# Patient Record
Sex: MALE | Race: WHITE | NOT HISPANIC OR LATINO | Employment: OTHER | URBAN - METROPOLITAN AREA
[De-identification: names, ages, dates, MRNs, and addresses within clinical notes are randomized per-mention and may not be internally consistent; named-entity substitution may affect disease eponyms.]

---

## 2017-01-07 ENCOUNTER — COMMUNICATION - HEALTHEAST (OUTPATIENT)
Dept: FAMILY MEDICINE | Facility: CLINIC | Age: 77
End: 2017-01-07

## 2017-01-07 DIAGNOSIS — I26.99 PULMONARY EMBOLUS (H): ICD-10-CM

## 2017-01-19 ENCOUNTER — RECORDS - HEALTHEAST (OUTPATIENT)
Dept: ADMINISTRATIVE | Facility: OTHER | Age: 77
End: 2017-01-19

## 2017-03-09 ENCOUNTER — COMMUNICATION - HEALTHEAST (OUTPATIENT)
Dept: FAMILY MEDICINE | Facility: CLINIC | Age: 77
End: 2017-03-09

## 2017-03-09 ENCOUNTER — OFFICE VISIT - HEALTHEAST (OUTPATIENT)
Dept: FAMILY MEDICINE | Facility: CLINIC | Age: 77
End: 2017-03-09

## 2017-03-09 DIAGNOSIS — N48.1 BALANITIS: ICD-10-CM

## 2017-03-09 DIAGNOSIS — G47.00 INSOMNIA: ICD-10-CM

## 2017-03-09 DIAGNOSIS — R53.83 MALAISE AND FATIGUE: ICD-10-CM

## 2017-03-09 DIAGNOSIS — L29.9 ITCHING: ICD-10-CM

## 2017-03-09 DIAGNOSIS — R53.81 MALAISE AND FATIGUE: ICD-10-CM

## 2017-03-23 ENCOUNTER — OFFICE VISIT - HEALTHEAST (OUTPATIENT)
Dept: FAMILY MEDICINE | Facility: CLINIC | Age: 77
End: 2017-03-23

## 2017-03-23 DIAGNOSIS — M54.16 LUMBAR RADICULOPATHY: ICD-10-CM

## 2017-03-31 ENCOUNTER — HOSPITAL ENCOUNTER (OUTPATIENT)
Dept: MRI IMAGING | Facility: CLINIC | Age: 77
Discharge: HOME | End: 2017-03-31
Attending: FAMILY MEDICINE

## 2017-03-31 DIAGNOSIS — M54.16 LUMBAR RADICULOPATHY: ICD-10-CM

## 2017-04-02 ENCOUNTER — AMBULATORY - HEALTHEAST (OUTPATIENT)
Dept: FAMILY MEDICINE | Facility: CLINIC | Age: 77
End: 2017-04-02

## 2017-04-02 ENCOUNTER — COMMUNICATION - HEALTHEAST (OUTPATIENT)
Dept: FAMILY MEDICINE | Facility: CLINIC | Age: 77
End: 2017-04-02

## 2017-04-02 DIAGNOSIS — R10.31 RIGHT GROIN PAIN: ICD-10-CM

## 2017-04-03 ENCOUNTER — RECORDS - HEALTHEAST (OUTPATIENT)
Dept: GENERAL RADIOLOGY | Facility: CLINIC | Age: 77
End: 2017-04-03

## 2017-04-03 ENCOUNTER — AMBULATORY - HEALTHEAST (OUTPATIENT)
Dept: LAB | Facility: CLINIC | Age: 77
End: 2017-04-03

## 2017-04-03 DIAGNOSIS — R10.30 LOWER ABDOMINAL PAIN, UNSPECIFIED: ICD-10-CM

## 2017-04-03 DIAGNOSIS — R10.31 RIGHT GROIN PAIN: ICD-10-CM

## 2017-04-04 ENCOUNTER — COMMUNICATION - HEALTHEAST (OUTPATIENT)
Dept: FAMILY MEDICINE | Facility: CLINIC | Age: 77
End: 2017-04-04

## 2017-04-04 DIAGNOSIS — I26.99 PULMONARY EMBOLUS (H): ICD-10-CM

## 2017-04-05 ENCOUNTER — HOSPITAL ENCOUNTER (OUTPATIENT)
Dept: PHYSICAL MEDICINE AND REHAB | Facility: CLINIC | Age: 77
Discharge: HOME | End: 2017-04-05
Attending: PHYSICIAN ASSISTANT

## 2017-04-05 DIAGNOSIS — M54.16 LUMBAR RADICULITIS: ICD-10-CM

## 2017-04-05 DIAGNOSIS — M76.30 IT BAND SYNDROME: ICD-10-CM

## 2017-04-05 DIAGNOSIS — G47.00 INSOMNIA: ICD-10-CM

## 2017-04-05 DIAGNOSIS — M47.816 LUMBAR FACET ARTHROPATHY: ICD-10-CM

## 2017-04-05 RX ORDER — ACETAMINOPHEN 500 MG
500 TABLET ORAL EVERY 6 HOURS PRN
Status: SHIPPED | COMMUNITY
Start: 2017-04-05

## 2017-04-05 ASSESSMENT — MIFFLIN-ST. JEOR: SCORE: 1547.49

## 2017-04-19 ENCOUNTER — RECORDS - HEALTHEAST (OUTPATIENT)
Dept: ADMINISTRATIVE | Facility: OTHER | Age: 77
End: 2017-04-19

## 2017-05-02 ENCOUNTER — COMMUNICATION - HEALTHEAST (OUTPATIENT)
Dept: FAMILY MEDICINE | Facility: CLINIC | Age: 77
End: 2017-05-02

## 2017-06-01 ENCOUNTER — COMMUNICATION - HEALTHEAST (OUTPATIENT)
Dept: FAMILY MEDICINE | Facility: CLINIC | Age: 77
End: 2017-06-01

## 2017-06-26 ENCOUNTER — RECORDS - HEALTHEAST (OUTPATIENT)
Dept: ADMINISTRATIVE | Facility: OTHER | Age: 77
End: 2017-06-26

## 2017-06-29 ENCOUNTER — OFFICE VISIT - HEALTHEAST (OUTPATIENT)
Dept: FAMILY MEDICINE | Facility: CLINIC | Age: 77
End: 2017-06-29

## 2017-06-29 ENCOUNTER — RECORDS - HEALTHEAST (OUTPATIENT)
Dept: GENERAL RADIOLOGY | Facility: CLINIC | Age: 77
End: 2017-06-29

## 2017-06-29 DIAGNOSIS — M25.511 PAIN IN RIGHT SHOULDER: ICD-10-CM

## 2017-06-29 DIAGNOSIS — G47.00 INSOMNIA: ICD-10-CM

## 2017-06-29 DIAGNOSIS — M25.511 ACUTE PAIN OF RIGHT SHOULDER: ICD-10-CM

## 2017-06-29 DIAGNOSIS — F41.1 ANXIETY STATE: ICD-10-CM

## 2017-06-29 ASSESSMENT — MIFFLIN-ST. JEOR: SCORE: 1548.39

## 2017-07-02 ENCOUNTER — COMMUNICATION - HEALTHEAST (OUTPATIENT)
Dept: FAMILY MEDICINE | Facility: CLINIC | Age: 77
End: 2017-07-02

## 2017-07-02 DIAGNOSIS — I26.99 PULMONARY EMBOLUS (H): ICD-10-CM

## 2017-07-07 ENCOUNTER — RECORDS - HEALTHEAST (OUTPATIENT)
Dept: ADMINISTRATIVE | Facility: OTHER | Age: 77
End: 2017-07-07

## 2017-07-09 ENCOUNTER — COMMUNICATION - HEALTHEAST (OUTPATIENT)
Dept: FAMILY MEDICINE | Facility: CLINIC | Age: 77
End: 2017-07-09

## 2017-07-21 ENCOUNTER — RECORDS - HEALTHEAST (OUTPATIENT)
Dept: ADMINISTRATIVE | Facility: OTHER | Age: 77
End: 2017-07-21

## 2017-08-17 ENCOUNTER — OFFICE VISIT - HEALTHEAST (OUTPATIENT)
Dept: FAMILY MEDICINE | Facility: CLINIC | Age: 77
End: 2017-08-17

## 2017-08-17 DIAGNOSIS — G47.00 INSOMNIA: ICD-10-CM

## 2017-08-17 DIAGNOSIS — I10 ESSENTIAL HYPERTENSION: ICD-10-CM

## 2017-09-08 ENCOUNTER — OFFICE VISIT - HEALTHEAST (OUTPATIENT)
Dept: FAMILY MEDICINE | Facility: CLINIC | Age: 77
End: 2017-09-08

## 2017-09-08 ENCOUNTER — COMMUNICATION - HEALTHEAST (OUTPATIENT)
Dept: SCHEDULING | Facility: CLINIC | Age: 77
End: 2017-09-08

## 2017-09-08 DIAGNOSIS — I10 ESSENTIAL HYPERTENSION WITH GOAL BLOOD PRESSURE LESS THAN 130/85: ICD-10-CM

## 2017-09-14 ENCOUNTER — COMMUNICATION - HEALTHEAST (OUTPATIENT)
Dept: FAMILY MEDICINE | Facility: CLINIC | Age: 77
End: 2017-09-14

## 2017-09-14 DIAGNOSIS — E78.5 HYPERLIPIDEMIA: ICD-10-CM

## 2017-09-27 ENCOUNTER — COMMUNICATION - HEALTHEAST (OUTPATIENT)
Dept: FAMILY MEDICINE | Facility: CLINIC | Age: 77
End: 2017-09-27

## 2017-09-27 DIAGNOSIS — I26.99 PULMONARY EMBOLUS (H): ICD-10-CM

## 2017-10-05 ENCOUNTER — OFFICE VISIT - HEALTHEAST (OUTPATIENT)
Dept: FAMILY MEDICINE | Facility: CLINIC | Age: 77
End: 2017-10-05

## 2017-10-05 ENCOUNTER — RECORDS - HEALTHEAST (OUTPATIENT)
Dept: GENERAL RADIOLOGY | Facility: CLINIC | Age: 77
End: 2017-10-05

## 2017-10-05 DIAGNOSIS — C61 MALIGNANT NEOPLASM OF PROSTATE (H): ICD-10-CM

## 2017-10-05 DIAGNOSIS — D68.51 FACTOR V LEIDEN MUTATION (H): ICD-10-CM

## 2017-10-05 DIAGNOSIS — E78.5 HYPERLIPIDEMIA: ICD-10-CM

## 2017-10-05 DIAGNOSIS — M54.2 CERVICAL PAIN (NECK): ICD-10-CM

## 2017-10-05 DIAGNOSIS — Z00.00 ROUTINE GENERAL MEDICAL EXAMINATION AT A HEALTH CARE FACILITY: ICD-10-CM

## 2017-10-05 DIAGNOSIS — M25.511 SHOULDER PAIN, RIGHT: ICD-10-CM

## 2017-10-05 DIAGNOSIS — M54.2 CERVICALGIA: ICD-10-CM

## 2017-10-05 DIAGNOSIS — I10 ESSENTIAL HYPERTENSION: ICD-10-CM

## 2017-10-05 DIAGNOSIS — I26.99 PULMONARY EMBOLUS (H): ICD-10-CM

## 2017-10-05 LAB
CHOLEST SERPL-MCNC: 205 MG/DL
FASTING STATUS PATIENT QL REPORTED: YES
HDLC SERPL-MCNC: 57 MG/DL
LDLC SERPL CALC-MCNC: 106 MG/DL
TRIGL SERPL-MCNC: 210 MG/DL

## 2017-10-05 ASSESSMENT — MIFFLIN-ST. JEOR: SCORE: 1538.19

## 2017-10-06 ENCOUNTER — RECORDS - HEALTHEAST (OUTPATIENT)
Dept: ADMINISTRATIVE | Facility: OTHER | Age: 77
End: 2017-10-06

## 2017-10-10 ENCOUNTER — RECORDS - HEALTHEAST (OUTPATIENT)
Dept: ADMINISTRATIVE | Facility: OTHER | Age: 77
End: 2017-10-10

## 2017-10-24 ENCOUNTER — RECORDS - HEALTHEAST (OUTPATIENT)
Dept: ADMINISTRATIVE | Facility: OTHER | Age: 77
End: 2017-10-24

## 2017-11-12 ENCOUNTER — COMMUNICATION - HEALTHEAST (OUTPATIENT)
Dept: FAMILY MEDICINE | Facility: CLINIC | Age: 77
End: 2017-11-12

## 2017-11-12 DIAGNOSIS — G47.00 INSOMNIA: ICD-10-CM

## 2017-11-22 ENCOUNTER — COMMUNICATION - HEALTHEAST (OUTPATIENT)
Dept: FAMILY MEDICINE | Facility: CLINIC | Age: 77
End: 2017-11-22

## 2017-11-22 DIAGNOSIS — I10 ESSENTIAL HYPERTENSION: ICD-10-CM

## 2017-11-29 ENCOUNTER — OFFICE VISIT - HEALTHEAST (OUTPATIENT)
Dept: FAMILY MEDICINE | Facility: CLINIC | Age: 77
End: 2017-11-29

## 2017-11-29 DIAGNOSIS — M47.812 ARTHROPATHY OF CERVICAL SPINE: ICD-10-CM

## 2017-11-29 DIAGNOSIS — M54.2 CERVICAL PAIN (NECK): ICD-10-CM

## 2017-11-29 ASSESSMENT — MIFFLIN-ST. JEOR: SCORE: 1551.8

## 2017-12-04 ENCOUNTER — RECORDS - HEALTHEAST (OUTPATIENT)
Dept: ADMINISTRATIVE | Facility: OTHER | Age: 77
End: 2017-12-04

## 2017-12-11 ENCOUNTER — RECORDS - HEALTHEAST (OUTPATIENT)
Dept: ADMINISTRATIVE | Facility: OTHER | Age: 77
End: 2017-12-11

## 2017-12-11 ENCOUNTER — COMMUNICATION - HEALTHEAST (OUTPATIENT)
Dept: FAMILY MEDICINE | Facility: CLINIC | Age: 77
End: 2017-12-11

## 2017-12-11 DIAGNOSIS — E78.5 HYPERLIPIDEMIA: ICD-10-CM

## 2017-12-29 ENCOUNTER — RECORDS - HEALTHEAST (OUTPATIENT)
Dept: ADMINISTRATIVE | Facility: OTHER | Age: 77
End: 2017-12-29

## 2018-01-05 ENCOUNTER — RECORDS - HEALTHEAST (OUTPATIENT)
Dept: ADMINISTRATIVE | Facility: OTHER | Age: 78
End: 2018-01-05

## 2018-02-01 ENCOUNTER — RECORDS - HEALTHEAST (OUTPATIENT)
Dept: ADMINISTRATIVE | Facility: OTHER | Age: 78
End: 2018-02-01

## 2018-02-10 ENCOUNTER — COMMUNICATION - HEALTHEAST (OUTPATIENT)
Dept: FAMILY MEDICINE | Facility: CLINIC | Age: 78
End: 2018-02-10

## 2018-02-10 DIAGNOSIS — G47.00 INSOMNIA: ICD-10-CM

## 2018-02-12 ENCOUNTER — OFFICE VISIT - HEALTHEAST (OUTPATIENT)
Dept: FAMILY MEDICINE | Facility: CLINIC | Age: 78
End: 2018-02-12

## 2018-02-12 DIAGNOSIS — I10 HYPERTENSION: ICD-10-CM

## 2018-02-12 ASSESSMENT — MIFFLIN-ST. JEOR: SCORE: 1556.33

## 2018-03-16 ENCOUNTER — AMBULATORY - HEALTHEAST (OUTPATIENT)
Dept: LAB | Facility: CLINIC | Age: 78
End: 2018-03-16

## 2018-03-16 DIAGNOSIS — I10 HYPERTENSION: ICD-10-CM

## 2018-03-16 LAB
CREAT SERPL-MCNC: 1.14 MG/DL (ref 0.7–1.3)
GFR SERPL CREATININE-BSD FRML MDRD: >60 ML/MIN/1.73M2
POTASSIUM BLD-SCNC: 4.7 MMOL/L (ref 3.5–5)

## 2018-03-20 ENCOUNTER — COMMUNICATION - HEALTHEAST (OUTPATIENT)
Dept: FAMILY MEDICINE | Facility: CLINIC | Age: 78
End: 2018-03-20

## 2018-03-20 DIAGNOSIS — I26.99 PULMONARY EMBOLUS (H): ICD-10-CM

## 2018-04-11 ENCOUNTER — RECORDS - HEALTHEAST (OUTPATIENT)
Dept: ADMINISTRATIVE | Facility: OTHER | Age: 78
End: 2018-04-11

## 2018-04-25 ENCOUNTER — RECORDS - HEALTHEAST (OUTPATIENT)
Dept: ADMINISTRATIVE | Facility: OTHER | Age: 78
End: 2018-04-25

## 2018-05-17 ENCOUNTER — COMMUNICATION - HEALTHEAST (OUTPATIENT)
Dept: FAMILY MEDICINE | Facility: CLINIC | Age: 78
End: 2018-05-17

## 2018-05-17 DIAGNOSIS — G47.00 INSOMNIA: ICD-10-CM

## 2018-05-24 ENCOUNTER — COMMUNICATION - HEALTHEAST (OUTPATIENT)
Dept: FAMILY MEDICINE | Facility: CLINIC | Age: 78
End: 2018-05-24

## 2018-05-31 ENCOUNTER — AMBULATORY - HEALTHEAST (OUTPATIENT)
Dept: NURSING | Facility: CLINIC | Age: 78
End: 2018-05-31

## 2018-06-12 ENCOUNTER — COMMUNICATION - HEALTHEAST (OUTPATIENT)
Dept: FAMILY MEDICINE | Facility: CLINIC | Age: 78
End: 2018-06-12

## 2018-06-12 DIAGNOSIS — I26.99 PULMONARY EMBOLUS (H): ICD-10-CM

## 2018-08-07 ENCOUNTER — COMMUNICATION - HEALTHEAST (OUTPATIENT)
Dept: FAMILY MEDICINE | Facility: CLINIC | Age: 78
End: 2018-08-07

## 2018-08-07 DIAGNOSIS — I10 HYPERTENSION: ICD-10-CM

## 2018-08-13 ENCOUNTER — COMMUNICATION - HEALTHEAST (OUTPATIENT)
Dept: FAMILY MEDICINE | Facility: CLINIC | Age: 78
End: 2018-08-13

## 2018-08-13 DIAGNOSIS — G47.00 INSOMNIA: ICD-10-CM

## 2018-08-23 ENCOUNTER — OFFICE VISIT - HEALTHEAST (OUTPATIENT)
Dept: FAMILY MEDICINE | Facility: CLINIC | Age: 78
End: 2018-08-23

## 2018-08-23 DIAGNOSIS — I10 HYPERTENSION: ICD-10-CM

## 2018-08-23 DIAGNOSIS — F41.1 ANXIETY STATE: ICD-10-CM

## 2018-08-23 DIAGNOSIS — E78.5 HYPERLIPIDEMIA: ICD-10-CM

## 2018-08-23 DIAGNOSIS — I26.99 PULMONARY EMBOLUS (H): ICD-10-CM

## 2018-08-23 ASSESSMENT — MIFFLIN-ST. JEOR: SCORE: 1547.83

## 2018-09-03 ENCOUNTER — COMMUNICATION - HEALTHEAST (OUTPATIENT)
Dept: FAMILY MEDICINE | Facility: CLINIC | Age: 78
End: 2018-09-03

## 2018-09-03 DIAGNOSIS — E78.5 HYPERLIPIDEMIA: ICD-10-CM

## 2018-10-10 ENCOUNTER — OFFICE VISIT - HEALTHEAST (OUTPATIENT)
Dept: FAMILY MEDICINE | Facility: CLINIC | Age: 78
End: 2018-10-10

## 2018-10-10 DIAGNOSIS — I26.99 PULMONARY EMBOLUS (H): ICD-10-CM

## 2018-10-10 DIAGNOSIS — Z23 FLU VACCINE NEED: ICD-10-CM

## 2018-10-10 DIAGNOSIS — I10 ESSENTIAL HYPERTENSION: ICD-10-CM

## 2018-10-10 DIAGNOSIS — D68.51 FACTOR V LEIDEN MUTATION (H): ICD-10-CM

## 2018-10-10 DIAGNOSIS — C61 PROSTATE CANCER (H): ICD-10-CM

## 2018-10-10 DIAGNOSIS — Z00.00 MEDICARE ANNUAL WELLNESS VISIT, SUBSEQUENT: ICD-10-CM

## 2018-10-10 DIAGNOSIS — Z23 NEED FOR SHINGLES VACCINE: ICD-10-CM

## 2018-10-10 DIAGNOSIS — E78.5 HYPERLIPIDEMIA: ICD-10-CM

## 2018-10-10 DIAGNOSIS — D12.6 POLYP OF COLON, ADENOMATOUS: ICD-10-CM

## 2018-10-10 LAB
ALBUMIN SERPL-MCNC: 3.8 G/DL (ref 3.5–5)
ALP SERPL-CCNC: 50 U/L (ref 45–120)
ALT SERPL W P-5'-P-CCNC: 22 U/L (ref 0–45)
ANION GAP SERPL CALCULATED.3IONS-SCNC: 9 MMOL/L (ref 5–18)
AST SERPL W P-5'-P-CCNC: 21 U/L (ref 0–40)
BILIRUB SERPL-MCNC: 1 MG/DL (ref 0–1)
BUN SERPL-MCNC: 21 MG/DL (ref 8–28)
CALCIUM SERPL-MCNC: 9.4 MG/DL (ref 8.5–10.5)
CHLORIDE BLD-SCNC: 106 MMOL/L (ref 98–107)
CHOLEST SERPL-MCNC: 177 MG/DL
CO2 SERPL-SCNC: 28 MMOL/L (ref 22–31)
CREAT SERPL-MCNC: 1.08 MG/DL (ref 0.7–1.3)
ERYTHROCYTE [DISTWIDTH] IN BLOOD BY AUTOMATED COUNT: 12 % (ref 11–14.5)
FASTING STATUS PATIENT QL REPORTED: YES
GFR SERPL CREATININE-BSD FRML MDRD: >60 ML/MIN/1.73M2
GLUCOSE BLD-MCNC: 89 MG/DL (ref 70–125)
HCT VFR BLD AUTO: 46.5 % (ref 40–54)
HDLC SERPL-MCNC: 54 MG/DL
HGB BLD-MCNC: 15.4 G/DL (ref 14–18)
LDLC SERPL CALC-MCNC: 88 MG/DL
MCH RBC QN AUTO: 32 PG (ref 27–34)
MCHC RBC AUTO-ENTMCNC: 33.1 G/DL (ref 32–36)
MCV RBC AUTO: 97 FL (ref 80–100)
PLATELET # BLD AUTO: 164 THOU/UL (ref 140–440)
PMV BLD AUTO: 8.3 FL (ref 7–10)
POTASSIUM BLD-SCNC: 4.3 MMOL/L (ref 3.5–5)
PROT SERPL-MCNC: 6.7 G/DL (ref 6–8)
RBC # BLD AUTO: 4.82 MILL/UL (ref 4.4–6.2)
SODIUM SERPL-SCNC: 143 MMOL/L (ref 136–145)
TRIGL SERPL-MCNC: 173 MG/DL
WBC: 4.3 THOU/UL (ref 4–11)

## 2018-10-10 ASSESSMENT — MIFFLIN-ST. JEOR: SCORE: 1526.28

## 2018-10-11 LAB — 25(OH)D3 SERPL-MCNC: 33.4 NG/ML (ref 30–80)

## 2018-11-20 ENCOUNTER — COMMUNICATION - HEALTHEAST (OUTPATIENT)
Dept: FAMILY MEDICINE | Facility: CLINIC | Age: 78
End: 2018-11-20

## 2018-11-20 DIAGNOSIS — I10 HYPERTENSION: ICD-10-CM

## 2018-11-24 ENCOUNTER — COMMUNICATION - HEALTHEAST (OUTPATIENT)
Dept: FAMILY MEDICINE | Facility: CLINIC | Age: 78
End: 2018-11-24

## 2018-11-24 DIAGNOSIS — G47.00 INSOMNIA: ICD-10-CM

## 2018-12-01 ENCOUNTER — COMMUNICATION - HEALTHEAST (OUTPATIENT)
Dept: FAMILY MEDICINE | Facility: CLINIC | Age: 78
End: 2018-12-01

## 2018-12-01 DIAGNOSIS — I26.99 PULMONARY EMBOLUS (H): ICD-10-CM

## 2018-12-20 ENCOUNTER — COMMUNICATION - HEALTHEAST (OUTPATIENT)
Dept: FAMILY MEDICINE | Facility: CLINIC | Age: 78
End: 2018-12-20

## 2019-01-24 ENCOUNTER — COMMUNICATION - HEALTHEAST (OUTPATIENT)
Dept: FAMILY MEDICINE | Facility: CLINIC | Age: 79
End: 2019-01-24

## 2019-01-24 DIAGNOSIS — I10 HYPERTENSION: ICD-10-CM

## 2019-01-25 ENCOUNTER — COMMUNICATION - HEALTHEAST (OUTPATIENT)
Dept: FAMILY MEDICINE | Facility: CLINIC | Age: 79
End: 2019-01-25

## 2019-01-25 DIAGNOSIS — I10 HYPERTENSION: ICD-10-CM

## 2019-01-31 ENCOUNTER — AMBULATORY - HEALTHEAST (OUTPATIENT)
Dept: FAMILY MEDICINE | Facility: CLINIC | Age: 79
End: 2019-01-31

## 2019-02-01 ENCOUNTER — AMBULATORY - HEALTHEAST (OUTPATIENT)
Dept: NURSING | Facility: CLINIC | Age: 79
End: 2019-02-01

## 2019-02-18 ENCOUNTER — RECORDS - HEALTHEAST (OUTPATIENT)
Dept: ADMINISTRATIVE | Facility: OTHER | Age: 79
End: 2019-02-18

## 2019-02-22 ENCOUNTER — COMMUNICATION - HEALTHEAST (OUTPATIENT)
Dept: FAMILY MEDICINE | Facility: CLINIC | Age: 79
End: 2019-02-22

## 2019-02-22 DIAGNOSIS — G47.00 INSOMNIA: ICD-10-CM

## 2019-02-25 ENCOUNTER — RECORDS - HEALTHEAST (OUTPATIENT)
Dept: ADMINISTRATIVE | Facility: OTHER | Age: 79
End: 2019-02-25

## 2019-05-17 ENCOUNTER — COMMUNICATION - HEALTHEAST (OUTPATIENT)
Dept: FAMILY MEDICINE | Facility: CLINIC | Age: 79
End: 2019-05-17

## 2019-05-17 DIAGNOSIS — I26.99 PULMONARY EMBOLUS (H): ICD-10-CM

## 2019-05-25 ENCOUNTER — COMMUNICATION - HEALTHEAST (OUTPATIENT)
Dept: FAMILY MEDICINE | Facility: CLINIC | Age: 79
End: 2019-05-25

## 2019-05-25 DIAGNOSIS — G47.00 INSOMNIA: ICD-10-CM

## 2019-06-06 ENCOUNTER — RECORDS - HEALTHEAST (OUTPATIENT)
Dept: ADMINISTRATIVE | Facility: OTHER | Age: 79
End: 2019-06-06

## 2019-07-12 ENCOUNTER — RECORDS - HEALTHEAST (OUTPATIENT)
Dept: ADMINISTRATIVE | Facility: OTHER | Age: 79
End: 2019-07-12

## 2019-07-24 ENCOUNTER — COMMUNICATION - HEALTHEAST (OUTPATIENT)
Dept: FAMILY MEDICINE | Facility: CLINIC | Age: 79
End: 2019-07-24

## 2019-07-24 DIAGNOSIS — F41.1 ANXIETY STATE: ICD-10-CM

## 2019-07-26 ENCOUNTER — OFFICE VISIT - HEALTHEAST (OUTPATIENT)
Dept: FAMILY MEDICINE | Facility: CLINIC | Age: 79
End: 2019-07-26

## 2019-07-26 DIAGNOSIS — B37.0 THRUSH: ICD-10-CM

## 2019-07-26 ASSESSMENT — MIFFLIN-ST. JEOR: SCORE: 1535.35

## 2019-08-15 ENCOUNTER — COMMUNICATION - HEALTHEAST (OUTPATIENT)
Dept: FAMILY MEDICINE | Facility: CLINIC | Age: 79
End: 2019-08-15

## 2019-08-15 DIAGNOSIS — I26.99 PULMONARY EMBOLUS (H): ICD-10-CM

## 2019-08-22 ENCOUNTER — COMMUNICATION - HEALTHEAST (OUTPATIENT)
Dept: FAMILY MEDICINE | Facility: CLINIC | Age: 79
End: 2019-08-22

## 2019-08-22 ENCOUNTER — OFFICE VISIT - HEALTHEAST (OUTPATIENT)
Dept: FAMILY MEDICINE | Facility: CLINIC | Age: 79
End: 2019-08-22

## 2019-08-22 DIAGNOSIS — I10 ESSENTIAL HYPERTENSION: ICD-10-CM

## 2019-08-22 DIAGNOSIS — K12.30 STOMATITIS AND MUCOSITIS: ICD-10-CM

## 2019-08-22 DIAGNOSIS — R63.30 FEEDING DIFFICULTIES: ICD-10-CM

## 2019-08-22 DIAGNOSIS — K12.1 STOMATITIS AND MUCOSITIS: ICD-10-CM

## 2019-08-22 DIAGNOSIS — B37.0 THRUSH: ICD-10-CM

## 2019-08-22 LAB
ANION GAP SERPL CALCULATED.3IONS-SCNC: 9 MMOL/L (ref 5–18)
BUN SERPL-MCNC: 20 MG/DL (ref 8–28)
CALCIUM SERPL-MCNC: 9.4 MG/DL (ref 8.5–10.5)
CHLORIDE BLD-SCNC: 107 MMOL/L (ref 98–107)
CO2 SERPL-SCNC: 25 MMOL/L (ref 22–31)
CREAT SERPL-MCNC: 1.16 MG/DL (ref 0.7–1.3)
GFR SERPL CREATININE-BSD FRML MDRD: >60 ML/MIN/1.73M2
GLUCOSE BLD-MCNC: 89 MG/DL (ref 70–125)
KOH PREPARATION: NORMAL
POTASSIUM BLD-SCNC: 4.3 MMOL/L (ref 3.5–5)
SODIUM SERPL-SCNC: 141 MMOL/L (ref 136–145)
VIT B12 SERPL-MCNC: 1032 PG/ML (ref 213–816)

## 2019-08-22 ASSESSMENT — MIFFLIN-ST. JEOR: SCORE: 1526.85

## 2019-08-23 ENCOUNTER — COMMUNICATION - HEALTHEAST (OUTPATIENT)
Dept: FAMILY MEDICINE | Facility: CLINIC | Age: 79
End: 2019-08-23

## 2019-08-23 DIAGNOSIS — E78.5 HYPERLIPIDEMIA: ICD-10-CM

## 2019-08-25 LAB — ZINC SERPL-MCNC: 86 UG/DL (ref 60–120)

## 2019-08-29 ENCOUNTER — COMMUNICATION - HEALTHEAST (OUTPATIENT)
Dept: FAMILY MEDICINE | Facility: CLINIC | Age: 79
End: 2019-08-29

## 2019-08-29 DIAGNOSIS — G47.00 INSOMNIA: ICD-10-CM

## 2019-09-16 ENCOUNTER — RECORDS - HEALTHEAST (OUTPATIENT)
Dept: ADMINISTRATIVE | Facility: OTHER | Age: 79
End: 2019-09-16

## 2019-10-14 ENCOUNTER — OFFICE VISIT - HEALTHEAST (OUTPATIENT)
Dept: FAMILY MEDICINE | Facility: CLINIC | Age: 79
End: 2019-10-14

## 2019-10-14 DIAGNOSIS — E78.5 HYPERLIPIDEMIA, UNSPECIFIED HYPERLIPIDEMIA TYPE: ICD-10-CM

## 2019-10-14 DIAGNOSIS — K12.1 STOMATITIS AND MUCOSITIS: ICD-10-CM

## 2019-10-14 DIAGNOSIS — Z23 NEEDS FLU SHOT: ICD-10-CM

## 2019-10-14 DIAGNOSIS — R42 DIZZINESS: ICD-10-CM

## 2019-10-14 DIAGNOSIS — E55.9 VITAMIN D DEFICIENCY: ICD-10-CM

## 2019-10-14 DIAGNOSIS — D12.6 ADENOMATOUS POLYP OF COLON, UNSPECIFIED PART OF COLON: ICD-10-CM

## 2019-10-14 DIAGNOSIS — I10 ESSENTIAL HYPERTENSION: ICD-10-CM

## 2019-10-14 DIAGNOSIS — C61 PROSTATE CANCER (H): ICD-10-CM

## 2019-10-14 DIAGNOSIS — K12.30 STOMATITIS AND MUCOSITIS: ICD-10-CM

## 2019-10-14 DIAGNOSIS — Z00.00 MEDICARE ANNUAL WELLNESS VISIT, SUBSEQUENT: ICD-10-CM

## 2019-10-14 DIAGNOSIS — I26.99 PULMONARY EMBOLISM, OTHER, UNSPECIFIED CHRONICITY, UNSPECIFIED WHETHER ACUTE COR PULMONALE PRESENT (H): ICD-10-CM

## 2019-10-14 DIAGNOSIS — D68.51 FACTOR V LEIDEN MUTATION (H): ICD-10-CM

## 2019-10-14 DIAGNOSIS — N52.9 ERECTILE DYSFUNCTION, UNSPECIFIED ERECTILE DYSFUNCTION TYPE: ICD-10-CM

## 2019-10-14 LAB
ALBUMIN SERPL-MCNC: 4.2 G/DL (ref 3.5–5)
ALP SERPL-CCNC: 58 U/L (ref 45–120)
ALT SERPL W P-5'-P-CCNC: 25 U/L (ref 0–45)
ANION GAP SERPL CALCULATED.3IONS-SCNC: 11 MMOL/L (ref 5–18)
AST SERPL W P-5'-P-CCNC: 23 U/L (ref 0–40)
BILIRUB SERPL-MCNC: 0.8 MG/DL (ref 0–1)
BUN SERPL-MCNC: 24 MG/DL (ref 8–28)
CALCIUM SERPL-MCNC: 9.2 MG/DL (ref 8.5–10.5)
CHLORIDE BLD-SCNC: 106 MMOL/L (ref 98–107)
CHOLEST SERPL-MCNC: 194 MG/DL
CO2 SERPL-SCNC: 25 MMOL/L (ref 22–31)
CREAT SERPL-MCNC: 1.19 MG/DL (ref 0.7–1.3)
ERYTHROCYTE [DISTWIDTH] IN BLOOD BY AUTOMATED COUNT: 12.3 % (ref 11–14.5)
FASTING STATUS PATIENT QL REPORTED: YES
GFR SERPL CREATININE-BSD FRML MDRD: 59 ML/MIN/1.73M2
GLUCOSE BLD-MCNC: 96 MG/DL (ref 70–125)
HCT VFR BLD AUTO: 46.2 % (ref 40–54)
HDLC SERPL-MCNC: 59 MG/DL
HGB BLD-MCNC: 15.9 G/DL (ref 14–18)
LDLC SERPL CALC-MCNC: 100 MG/DL
MCH RBC QN AUTO: 32.9 PG (ref 27–34)
MCHC RBC AUTO-ENTMCNC: 34.4 G/DL (ref 32–36)
MCV RBC AUTO: 96 FL (ref 80–100)
PLATELET # BLD AUTO: 155 THOU/UL (ref 140–440)
PMV BLD AUTO: 7.7 FL (ref 7–10)
POTASSIUM BLD-SCNC: 3.9 MMOL/L (ref 3.5–5)
PROT SERPL-MCNC: 7 G/DL (ref 6–8)
RBC # BLD AUTO: 4.82 MILL/UL (ref 4.4–6.2)
SODIUM SERPL-SCNC: 142 MMOL/L (ref 136–145)
TRIGL SERPL-MCNC: 174 MG/DL
WBC: 5 THOU/UL (ref 4–11)

## 2019-10-14 RX ORDER — SILDENAFIL CITRATE 20 MG/1
TABLET ORAL
Qty: 100 TABLET | Refills: 1 | Status: SHIPPED | OUTPATIENT
Start: 2019-10-14

## 2019-10-14 ASSESSMENT — MIFFLIN-ST. JEOR: SCORE: 1555.77

## 2019-10-15 LAB — 25(OH)D3 SERPL-MCNC: 45.3 NG/ML (ref 30–80)

## 2019-10-17 ENCOUNTER — RECORDS - HEALTHEAST (OUTPATIENT)
Dept: ADMINISTRATIVE | Facility: OTHER | Age: 79
End: 2019-10-17

## 2019-10-18 ENCOUNTER — COMMUNICATION - HEALTHEAST (OUTPATIENT)
Dept: FAMILY MEDICINE | Facility: CLINIC | Age: 79
End: 2019-10-18

## 2019-10-18 DIAGNOSIS — I10 HYPERTENSION: ICD-10-CM

## 2019-10-21 ENCOUNTER — HOSPITAL ENCOUNTER (OUTPATIENT)
Dept: ULTRASOUND IMAGING | Facility: CLINIC | Age: 79
Discharge: HOME | End: 2019-10-21
Attending: FAMILY MEDICINE

## 2019-10-21 DIAGNOSIS — R42 DIZZINESS: ICD-10-CM

## 2019-10-23 ENCOUNTER — COMMUNICATION - HEALTHEAST (OUTPATIENT)
Dept: FAMILY MEDICINE | Facility: CLINIC | Age: 79
End: 2019-10-23

## 2019-10-23 DIAGNOSIS — F41.1 ANXIETY STATE: ICD-10-CM

## 2019-11-01 ENCOUNTER — COMMUNICATION - HEALTHEAST (OUTPATIENT)
Dept: FAMILY MEDICINE | Facility: CLINIC | Age: 79
End: 2019-11-01

## 2019-11-01 DIAGNOSIS — I10 HYPERTENSION: ICD-10-CM

## 2019-12-02 ENCOUNTER — COMMUNICATION - HEALTHEAST (OUTPATIENT)
Dept: FAMILY MEDICINE | Facility: CLINIC | Age: 79
End: 2019-12-02

## 2019-12-02 DIAGNOSIS — G47.00 INSOMNIA: ICD-10-CM

## 2020-01-30 ENCOUNTER — RECORDS - HEALTHEAST (OUTPATIENT)
Dept: ADMINISTRATIVE | Facility: OTHER | Age: 80
End: 2020-01-30

## 2020-03-07 ENCOUNTER — COMMUNICATION - HEALTHEAST (OUTPATIENT)
Dept: FAMILY MEDICINE | Facility: CLINIC | Age: 80
End: 2020-03-07

## 2020-03-07 DIAGNOSIS — G47.00 INSOMNIA: ICD-10-CM

## 2020-03-09 ENCOUNTER — RECORDS - HEALTHEAST (OUTPATIENT)
Dept: ADMINISTRATIVE | Facility: OTHER | Age: 80
End: 2020-03-09

## 2020-03-11 ENCOUNTER — COMMUNICATION - HEALTHEAST (OUTPATIENT)
Dept: FAMILY MEDICINE | Facility: CLINIC | Age: 80
End: 2020-03-11

## 2020-03-11 DIAGNOSIS — G47.00 INSOMNIA: ICD-10-CM

## 2020-03-11 DIAGNOSIS — I26.99 PULMONARY EMBOLUS (H): ICD-10-CM

## 2020-03-17 ENCOUNTER — COMMUNICATION - HEALTHEAST (OUTPATIENT)
Dept: FAMILY MEDICINE | Facility: CLINIC | Age: 80
End: 2020-03-17

## 2020-04-27 ENCOUNTER — COMMUNICATION - HEALTHEAST (OUTPATIENT)
Dept: FAMILY MEDICINE | Facility: CLINIC | Age: 80
End: 2020-04-27

## 2020-04-27 ENCOUNTER — VIRTUAL VISIT (OUTPATIENT)
Dept: FAMILY MEDICINE | Facility: OTHER | Age: 80
End: 2020-04-27

## 2020-04-27 NOTE — PROGRESS NOTES
"Date: 2020 12:00:20  Clinician: Juvencio Franco  Clinician NPI: [id number]  Patient: Angel Luis Boykin  Patient : 1940  Patient Address: [address] [place] [address]  Patient Phone: [phone]  Visit Protocol: URI  Patient Summary:  Angel Luis is a 79 year old ( : 1940 ) male who initiated a Visit for COVID-19 (Coronavirus) evaluation and screening. When asked the question \"Please sign me up to receive news, health information and promotions from [employer].\", Angel Luis responded \"Yes\".    Angel Luis states his symptoms started 1-2 days ago.   His symptoms consist of a sore throat. Angel Luis also feels feverish.   Symptom details     Sore throat: Angel Luis reports having mild throat pain (1-3 on a 10 point pain scale), does not have exudate on his tonsils, and can swallow liquids. The lymph nodes in his neck are not enlarged. A rash has not appeared on the skin since the sore throat started.     Temperature: His current temperature is 99.0 degrees Fahrenheit.      Angel Luis denies having facial pain or pressure, cough, nasal congestion, malaise, ear pain, myalgias, rhinitis, headache, wheezing, enlarged lymph nodes, chills, vomiting, nausea, teeth pain, ageusia, anosmia, and diarrhea. He also denies taking antibiotic medication for the symptoms and having recent facial or sinus surgery in the past 60 days. He is not experiencing dyspnea.   Precipitating events  Angel Luis is not sure if he has been exposed to someone with strep throat.   Pertinent COVID-19 (Coronavirus) information  Angel Luis has not traveled internationally or to the areas where COVID-19 (Coronavirus) is widespread, including cruise ship travel in the last 14 days before the start of his symptoms.   Angel Luis does not work or volunteer as healthcare worker or a  and does not work or volunteer in a healthcare facility.   He does not live with a healthcare worker.   Angel Luis has not had a close contact with a laboratory-confirmed " "COVID-19 patient within 14 days of symptom onset. He also has not had a close contact with a suspected COVID-19 patient within 14 days of symptom onset.   Pertinent medical history  Angel Luis does not need a return to work/school note.   Weight: 178 lbs   Angel Luis does not smoke or use smokeless tobacco.   Additional information as reported by the patient (free text): Last night my temperature spiked to 100.4 for about an hour.  Then it went back to 99+-.  This morning when I got out of bed it was normal.  An hour later it was about 99.  My \"normal\" temperature is about 97.8.  My throat is not sore as much as it is scratchy/or it tickles.   Weight: 178 lbs    MEDICATIONS: pilocarpine oral, amlodipine besylate (bulk), losartan oral, hydroxyzine HCl oral, lorazepam oral, Xarelto oral, amlodipine-atorvastatin oral, ALLERGIES: NKDA  Clinician Response:  Dear Angel Luis,   Dear Angel Luis  Your symptoms show that you may have coronavirus (COVID-19). This illness can cause fever, cough and trouble breathing. Many people get a mild case and get better on their own. Some people can get very sick.   Will I be tested for COVID-19?  Because the virus is spreading, we are no longer testing most patients. You may request testing if:   You are very ill. For example, you're on chemotherapy, dialysis or home hospice care. (Contact your specialty clinic or program.)   You live in a nursing home or other long-term care facility. (Talk to your nurse manager or medical director.)   You're a health care worker. (Contact your employee health office.)   How can I protect others?  Without a test, we can't know for sure that you have COVID-19. For safety, it's very important to follow these rules.  First, stay home and away from others (self-isolate) until:   You've had no fever---and no medicine that reduces fever---for 3 full days (72 hours). And...    Your other symptoms have gotten better. For example, your cough or breathing has improved. And...   At " least 7 days have passed since your symptoms started.   During this time:   Don't go to work, school or anywhere else.    Stay away from others in your home. No hugging, kissing or shaking hands.   Don't let anyone visit.   Cover your mouth and nose with a mask, tissue or wash cloth to avoid spreading germs.   Wash your hands and face often. Use soap and water.   How can I take care of myself?   1.Take Tylenol (acetaminophen) for fever or pain. If you have liver or kidney problems, ask your family doctor if it's okay to take Tylenol.        Adults can take either:    650 mg (two 325 mg pills) every 4 to 6 hours, or...   1,000 mg (two 500 mg pills) every 8 hours as needed.    Note: Don't take more than 3,000 mg in one day.   For children, check the Tylenol bottle for the right dose. The dose is based on the child's age or weight.   2.If you have other health problems (like cancer, heart failure, an organ transplant or severe kidney disease): Call your specialty clinic if you don't feel better in the next 2 days.       3.Know when to call 911: If your breathing is so bad that it keeps you from doing normal activities, call 911 or go to the emergency room. Tell them that you've been staying home and may have COVID-19.   Where can I get more information?  To learn more about COVID-19 and how to care for yourself at home, please visit the CDC website at https://www.cdc.gov/coronavirus/2019-ncov/about/steps-when-sick.html.  For more about your care at [facility], please visit [email or web address]     Diagnosis: Fever, unspecified  Diagnosis ICD: R50.9

## 2020-05-25 ENCOUNTER — COMMUNICATION - HEALTHEAST (OUTPATIENT)
Dept: FAMILY MEDICINE | Facility: CLINIC | Age: 80
End: 2020-05-25

## 2020-05-26 ENCOUNTER — COMMUNICATION - HEALTHEAST (OUTPATIENT)
Dept: SCHEDULING | Facility: CLINIC | Age: 80
End: 2020-05-26

## 2020-05-26 ENCOUNTER — COMMUNICATION - HEALTHEAST (OUTPATIENT)
Dept: FAMILY MEDICINE | Facility: CLINIC | Age: 80
End: 2020-05-26

## 2020-05-26 ENCOUNTER — OFFICE VISIT - HEALTHEAST (OUTPATIENT)
Dept: FAMILY MEDICINE | Facility: CLINIC | Age: 80
End: 2020-05-26

## 2020-05-26 DIAGNOSIS — K20.90 ESOPHAGITIS: ICD-10-CM

## 2020-05-26 DIAGNOSIS — M54.31 BILATERAL SCIATICA: ICD-10-CM

## 2020-05-26 DIAGNOSIS — I26.99 PULMONARY EMBOLISM, OTHER, UNSPECIFIED CHRONICITY, UNSPECIFIED WHETHER ACUTE COR PULMONALE PRESENT (H): ICD-10-CM

## 2020-05-26 DIAGNOSIS — I10 ESSENTIAL HYPERTENSION: ICD-10-CM

## 2020-05-26 DIAGNOSIS — E78.5 HYPERLIPIDEMIA, UNSPECIFIED HYPERLIPIDEMIA TYPE: ICD-10-CM

## 2020-05-26 DIAGNOSIS — M54.32 BILATERAL SCIATICA: ICD-10-CM

## 2020-05-26 DIAGNOSIS — D68.51 FACTOR V LEIDEN MUTATION (H): ICD-10-CM

## 2020-05-26 DIAGNOSIS — D12.6 ADENOMATOUS POLYP OF COLON, UNSPECIFIED PART OF COLON: ICD-10-CM

## 2020-05-26 DIAGNOSIS — C61 PROSTATE CANCER (H): ICD-10-CM

## 2020-06-05 ENCOUNTER — RECORDS - HEALTHEAST (OUTPATIENT)
Dept: ADMINISTRATIVE | Facility: OTHER | Age: 80
End: 2020-06-05

## 2020-06-08 ENCOUNTER — RECORDS - HEALTHEAST (OUTPATIENT)
Dept: ADMINISTRATIVE | Facility: OTHER | Age: 80
End: 2020-06-08

## 2020-06-09 ENCOUNTER — COMMUNICATION - HEALTHEAST (OUTPATIENT)
Dept: FAMILY MEDICINE | Facility: CLINIC | Age: 80
End: 2020-06-09

## 2020-06-16 ENCOUNTER — COMMUNICATION - HEALTHEAST (OUTPATIENT)
Dept: FAMILY MEDICINE | Facility: CLINIC | Age: 80
End: 2020-06-16

## 2020-06-16 DIAGNOSIS — G47.00 INSOMNIA: ICD-10-CM

## 2020-07-17 ENCOUNTER — COMMUNICATION - HEALTHEAST (OUTPATIENT)
Dept: FAMILY MEDICINE | Facility: CLINIC | Age: 80
End: 2020-07-17

## 2020-07-17 DIAGNOSIS — K20.90 ESOPHAGITIS: ICD-10-CM

## 2020-07-28 ENCOUNTER — RECORDS - HEALTHEAST (OUTPATIENT)
Dept: ADMINISTRATIVE | Facility: OTHER | Age: 80
End: 2020-07-28

## 2020-08-09 ENCOUNTER — COMMUNICATION - HEALTHEAST (OUTPATIENT)
Dept: FAMILY MEDICINE | Facility: CLINIC | Age: 80
End: 2020-08-09

## 2020-08-09 DIAGNOSIS — E78.5 HYPERLIPIDEMIA: ICD-10-CM

## 2020-08-17 ENCOUNTER — COMMUNICATION - HEALTHEAST (OUTPATIENT)
Dept: FAMILY MEDICINE | Facility: CLINIC | Age: 80
End: 2020-08-17

## 2020-08-17 DIAGNOSIS — M54.31 BILATERAL SCIATICA: ICD-10-CM

## 2020-08-17 DIAGNOSIS — M54.32 BILATERAL SCIATICA: ICD-10-CM

## 2020-09-14 ENCOUNTER — COMMUNICATION - HEALTHEAST (OUTPATIENT)
Dept: FAMILY MEDICINE | Facility: CLINIC | Age: 80
End: 2020-09-14

## 2020-09-14 DIAGNOSIS — G47.00 INSOMNIA: ICD-10-CM

## 2020-09-15 ENCOUNTER — COMMUNICATION - HEALTHEAST (OUTPATIENT)
Dept: FAMILY MEDICINE | Facility: CLINIC | Age: 80
End: 2020-09-15

## 2020-09-16 ENCOUNTER — COMMUNICATION - HEALTHEAST (OUTPATIENT)
Dept: FAMILY MEDICINE | Facility: CLINIC | Age: 80
End: 2020-09-16

## 2020-10-07 ENCOUNTER — RECORDS - HEALTHEAST (OUTPATIENT)
Dept: ADMINISTRATIVE | Facility: OTHER | Age: 80
End: 2020-10-07

## 2020-10-16 ENCOUNTER — COMMUNICATION - HEALTHEAST (OUTPATIENT)
Dept: FAMILY MEDICINE | Facility: CLINIC | Age: 80
End: 2020-10-16

## 2020-10-16 DIAGNOSIS — I10 HYPERTENSION: ICD-10-CM

## 2020-10-16 DIAGNOSIS — I26.99 PULMONARY EMBOLUS (H): ICD-10-CM

## 2020-10-16 DIAGNOSIS — F41.1 ANXIETY STATE: ICD-10-CM

## 2020-10-28 ENCOUNTER — OFFICE VISIT - HEALTHEAST (OUTPATIENT)
Dept: FAMILY MEDICINE | Facility: CLINIC | Age: 80
End: 2020-10-28

## 2020-10-28 DIAGNOSIS — R68.2 DRY MOUTH: ICD-10-CM

## 2020-10-28 DIAGNOSIS — E78.5 HYPERLIPIDEMIA, UNSPECIFIED HYPERLIPIDEMIA TYPE: ICD-10-CM

## 2020-10-28 DIAGNOSIS — C61 MALIGNANT NEOPLASM OF PROSTATE (H): ICD-10-CM

## 2020-10-28 DIAGNOSIS — I10 ESSENTIAL HYPERTENSION: ICD-10-CM

## 2020-10-28 DIAGNOSIS — G60.9 HEREDITARY AND IDIOPATHIC PERIPHERAL NEUROPATHY: ICD-10-CM

## 2020-10-28 DIAGNOSIS — M54.31 BILATERAL SCIATICA: ICD-10-CM

## 2020-10-28 DIAGNOSIS — D12.6 ADENOMATOUS POLYP OF COLON, UNSPECIFIED PART OF COLON: ICD-10-CM

## 2020-10-28 DIAGNOSIS — I82.5Y1 CHRONIC DEEP VEIN THROMBOSIS (DVT) OF PROXIMAL VEIN OF RIGHT LOWER EXTREMITY (H): ICD-10-CM

## 2020-10-28 DIAGNOSIS — M79.2 NEUROPATHIC PAIN OF FOOT, UNSPECIFIED LATERALITY: ICD-10-CM

## 2020-10-28 DIAGNOSIS — R01.1 HEART MURMUR: ICD-10-CM

## 2020-10-28 DIAGNOSIS — M54.32 BILATERAL SCIATICA: ICD-10-CM

## 2020-10-28 DIAGNOSIS — Z00.00 HEALTHCARE MAINTENANCE: ICD-10-CM

## 2020-10-28 LAB
ALT SERPL W P-5'-P-CCNC: 19 U/L (ref 0–45)
ANION GAP SERPL CALCULATED.3IONS-SCNC: 10 MMOL/L (ref 5–18)
BUN SERPL-MCNC: 19 MG/DL (ref 8–28)
CALCIUM SERPL-MCNC: 9.1 MG/DL (ref 8.5–10.5)
CHLORIDE BLD-SCNC: 105 MMOL/L (ref 98–107)
CHOLEST SERPL-MCNC: 189 MG/DL
CO2 SERPL-SCNC: 26 MMOL/L (ref 22–31)
CREAT SERPL-MCNC: 1.05 MG/DL (ref 0.7–1.3)
ERYTHROCYTE [DISTWIDTH] IN BLOOD BY AUTOMATED COUNT: 13.4 % (ref 11–14.5)
ERYTHROCYTE [SEDIMENTATION RATE] IN BLOOD BY WESTERGREN METHOD: 16 MM/HR (ref 0–15)
FASTING STATUS PATIENT QL REPORTED: YES
GFR SERPL CREATININE-BSD FRML MDRD: >60 ML/MIN/1.73M2
GLUCOSE BLD-MCNC: 86 MG/DL (ref 70–125)
HCT VFR BLD AUTO: 46.4 % (ref 40–54)
HDLC SERPL-MCNC: 54 MG/DL
HGB BLD-MCNC: 15.2 G/DL (ref 14–18)
LDLC SERPL CALC-MCNC: 95 MG/DL
MCH RBC QN AUTO: 31.9 PG (ref 27–34)
MCHC RBC AUTO-ENTMCNC: 32.8 G/DL (ref 32–36)
MCV RBC AUTO: 97 FL (ref 80–100)
PLATELET # BLD AUTO: 157 THOU/UL (ref 140–440)
PMV BLD AUTO: 8.1 FL (ref 7–10)
POTASSIUM BLD-SCNC: 4.3 MMOL/L (ref 3.5–5)
RBC # BLD AUTO: 4.77 MILL/UL (ref 4.4–6.2)
SODIUM SERPL-SCNC: 141 MMOL/L (ref 136–145)
TRIGL SERPL-MCNC: 198 MG/DL
TSH SERPL DL<=0.005 MIU/L-ACNC: 1.67 UIU/ML (ref 0.3–5)
VIT B12 SERPL-MCNC: 993 PG/ML (ref 213–816)
WBC: 4.3 THOU/UL (ref 4–11)

## 2020-10-28 ASSESSMENT — MIFFLIN-ST. JEOR: SCORE: 1538.19

## 2020-10-29 ENCOUNTER — COMMUNICATION - HEALTHEAST (OUTPATIENT)
Dept: FAMILY MEDICINE | Facility: CLINIC | Age: 80
End: 2020-10-29

## 2020-10-29 LAB — HCV AB SERPL QL IA: NEGATIVE

## 2020-11-08 ENCOUNTER — HEALTH MAINTENANCE LETTER (OUTPATIENT)
Age: 80
End: 2020-11-08

## 2020-11-12 ENCOUNTER — COMMUNICATION - HEALTHEAST (OUTPATIENT)
Dept: FAMILY MEDICINE | Facility: CLINIC | Age: 80
End: 2020-11-12

## 2020-11-12 DIAGNOSIS — K20.90 ESOPHAGITIS: ICD-10-CM

## 2020-11-20 ENCOUNTER — HOSPITAL ENCOUNTER (OUTPATIENT)
Dept: CARDIOLOGY | Facility: HOSPITAL | Age: 80
Discharge: HOME | End: 2020-11-20
Attending: FAMILY MEDICINE

## 2020-11-20 DIAGNOSIS — R01.1 HEART MURMUR: ICD-10-CM

## 2020-11-20 LAB
AORTIC ROOT: 3.3 CM
AORTIC VALVE MEAN VELOCITY: 122 CM/S
ASCENDING AORTA: 3.4 CM
AV DIMENSIONLESS INDEX VTI: 0.9
AV MEAN GRADIENT: 7 MMHG
AV PEAK GRADIENT: 13 MMHG
AV VALVE AREA: 2.7 CM2
AV VELOCITY RATIO: 0.9
BSA FOR ECHO PROCEDURE: 2 M2
CV BLOOD PRESSURE: NORMAL MMHG
CV ECHO HEIGHT: 70 IN
CV ECHO WEIGHT: 179 LBS
DOP CALC AO PEAK VEL: 180 CM/S
DOP CALC AO VTI: 42.3 CM
DOP CALC LVOT AREA: 3.14 CM2
DOP CALC LVOT DIAMETER: 2 CM
DOP CALC LVOT PEAK VEL: 160 CM/S
DOP CALC LVOT STROKE VOLUME: 113.4 CM3
DOP CALCLVOT PEAK VEL VTI: 36.1 CM
EJECTION FRACTION: 60 % (ref 55–75)
FRACTIONAL SHORTENING: 35.5 % (ref 28–44)
INTERVENTRICULAR SEPTUM IN END DIASTOLE: 0.98 CM (ref 0.6–1)
IVS/PW RATIO: 1.1
LA AREA 1: 21.4 CM2
LA AREA 2: 25.2 CM2
LEFT ATRIUM LENGTH: 5.4 CM
LEFT ATRIUM SIZE: 3.8 CM
LEFT ATRIUM TO AORTIC ROOT RATIO: 1.15 NO UNITS
LEFT ATRIUM VOLUME INDEX: 42.4 ML/M2
LEFT ATRIUM VOLUME: 84.9 ML
LEFT VENTRICLE CARDIAC INDEX: 3.3 L/MIN/M2
LEFT VENTRICLE CARDIAC OUTPUT: 6.6 L/MIN
LEFT VENTRICLE DIASTOLIC VOLUME INDEX: 55.5 CM3/M2 (ref 34–74)
LEFT VENTRICLE DIASTOLIC VOLUME: 111 CM3 (ref 62–150)
LEFT VENTRICLE HEART RATE: 58 BPM
LEFT VENTRICLE MASS INDEX: 95.1 G/M2
LEFT VENTRICLE SYSTOLIC VOLUME INDEX: 22.4 CM3/M2 (ref 11–31)
LEFT VENTRICLE SYSTOLIC VOLUME: 44.8 CM3 (ref 21–61)
LEFT VENTRICULAR INTERNAL DIMENSION IN DIASTOLE: 5.33 CM (ref 4.2–5.8)
LEFT VENTRICULAR INTERNAL DIMENSION IN SYSTOLE: 3.44 CM (ref 2.5–4)
LEFT VENTRICULAR MASS: 190.1 G
LEFT VENTRICULAR OUTFLOW TRACT MEAN GRADIENT: 5 MMHG
LEFT VENTRICULAR OUTFLOW TRACT MEAN VELOCITY: 98.5 CM/S
LEFT VENTRICULAR OUTFLOW TRACT PEAK GRADIENT: 10 MMHG
LEFT VENTRICULAR POSTERIOR WALL IN END DIASTOLE: 0.93 CM (ref 0.6–1)
LV STROKE VOLUME INDEX: 56.7 ML/M2
MITRAL VALVE E/A RATIO: 1.2
MV AVERAGE E/E' RATIO: 13.1 CM/S
MV DECELERATION TIME: 236 MS
MV E'TISSUE VEL-LAT: 8.8 CM/S
MV E'TISSUE VEL-MED: 6.67 CM/S
MV LATERAL E/E' RATIO: 11.5
MV MEDIAL E/E' RATIO: 15.1
MV PEAK A VELOCITY: 83.2 CM/S
MV PEAK E VELOCITY: 101 CM/S
NUC REST DIASTOLIC VOLUME INDEX: 2864 LBS
NUC REST SYSTOLIC VOLUME INDEX: 70 IN
TRICUSPID REGURGITATION PEAK PRESSURE GRADIENT: 21 MMHG
TRICUSPID VALVE ANULAR PLANE SYSTOLIC EXCURSION: 2.6 CM
TRICUSPID VALVE PEAK REGURGITANT VELOCITY: 229 CM/S

## 2020-11-20 ASSESSMENT — MIFFLIN-ST. JEOR: SCORE: 1538.19

## 2020-11-24 ENCOUNTER — OFFICE VISIT - HEALTHEAST (OUTPATIENT)
Dept: FAMILY MEDICINE | Facility: CLINIC | Age: 80
End: 2020-11-24

## 2020-11-24 DIAGNOSIS — Z01.818 PREOPERATIVE EXAMINATION: ICD-10-CM

## 2020-11-24 DIAGNOSIS — H26.9 CATARACT, UNSPECIFIED CATARACT TYPE, UNSPECIFIED LATERALITY: ICD-10-CM

## 2020-11-30 ENCOUNTER — COMMUNICATION - HEALTHEAST (OUTPATIENT)
Dept: FAMILY MEDICINE | Facility: CLINIC | Age: 80
End: 2020-11-30

## 2020-12-01 ENCOUNTER — RECORDS - HEALTHEAST (OUTPATIENT)
Dept: ADMINISTRATIVE | Facility: OTHER | Age: 80
End: 2020-12-01

## 2020-12-18 ENCOUNTER — COMMUNICATION - HEALTHEAST (OUTPATIENT)
Dept: FAMILY MEDICINE | Facility: CLINIC | Age: 80
End: 2020-12-18

## 2020-12-18 DIAGNOSIS — G47.00 INSOMNIA: ICD-10-CM

## 2021-01-13 ENCOUNTER — COMMUNICATION - HEALTHEAST (OUTPATIENT)
Dept: FAMILY MEDICINE | Facility: CLINIC | Age: 81
End: 2021-01-13

## 2021-01-18 ENCOUNTER — RECORDS - HEALTHEAST (OUTPATIENT)
Dept: ADMINISTRATIVE | Facility: OTHER | Age: 81
End: 2021-01-18

## 2021-01-28 ENCOUNTER — RECORDS - HEALTHEAST (OUTPATIENT)
Dept: ADMINISTRATIVE | Facility: OTHER | Age: 81
End: 2021-01-28

## 2021-02-15 ENCOUNTER — COMMUNICATION - HEALTHEAST (OUTPATIENT)
Dept: FAMILY MEDICINE | Facility: CLINIC | Age: 81
End: 2021-02-15

## 2021-02-15 DIAGNOSIS — K20.90 ESOPHAGITIS: ICD-10-CM

## 2021-02-16 ENCOUNTER — COMMUNICATION - HEALTHEAST (OUTPATIENT)
Dept: FAMILY MEDICINE | Facility: CLINIC | Age: 81
End: 2021-02-16

## 2021-02-17 ENCOUNTER — AMBULATORY - HEALTHEAST (OUTPATIENT)
Dept: FAMILY MEDICINE | Facility: CLINIC | Age: 81
End: 2021-02-17

## 2021-02-17 DIAGNOSIS — R68.2 DRY MOUTH: ICD-10-CM

## 2021-02-18 ENCOUNTER — AMBULATORY - HEALTHEAST (OUTPATIENT)
Dept: FAMILY MEDICINE | Facility: CLINIC | Age: 81
End: 2021-02-18

## 2021-02-18 DIAGNOSIS — R68.2 DRY MOUTH: ICD-10-CM

## 2021-02-18 RX ORDER — PILOCARPINE HYDROCHLORIDE 5 MG/1
5 TABLET, FILM COATED ORAL 4 TIMES DAILY
Qty: 360 TABLET | Refills: 3 | Status: SHIPPED | OUTPATIENT
Start: 2021-02-18 | End: 2022-04-20

## 2021-02-22 ENCOUNTER — RECORDS - HEALTHEAST (OUTPATIENT)
Dept: ADMINISTRATIVE | Facility: OTHER | Age: 81
End: 2021-02-22

## 2021-03-13 ENCOUNTER — COMMUNICATION - HEALTHEAST (OUTPATIENT)
Dept: FAMILY MEDICINE | Facility: CLINIC | Age: 81
End: 2021-03-13

## 2021-03-13 DIAGNOSIS — G47.00 INSOMNIA: ICD-10-CM

## 2021-04-10 ENCOUNTER — COMMUNICATION - HEALTHEAST (OUTPATIENT)
Dept: FAMILY MEDICINE | Facility: CLINIC | Age: 81
End: 2021-04-10

## 2021-04-10 DIAGNOSIS — I26.99 PULMONARY EMBOLUS (H): ICD-10-CM

## 2021-04-10 DIAGNOSIS — E78.5 HYPERLIPIDEMIA: ICD-10-CM

## 2021-04-10 DIAGNOSIS — I10 HYPERTENSION: ICD-10-CM

## 2021-04-11 RX ORDER — AMLODIPINE BESYLATE 5 MG/1
TABLET ORAL
Qty: 90 TABLET | Refills: 0 | Status: SHIPPED | OUTPATIENT
Start: 2021-04-11 | End: 2021-07-11

## 2021-04-11 RX ORDER — ATORVASTATIN CALCIUM 40 MG/1
TABLET, FILM COATED ORAL
Qty: 90 TABLET | Refills: 0 | Status: SHIPPED | OUTPATIENT
Start: 2021-04-11 | End: 2021-07-11

## 2021-04-11 RX ORDER — RIVAROXABAN 20 MG/1
TABLET, FILM COATED ORAL
Qty: 90 TABLET | Refills: 0 | Status: SHIPPED | OUTPATIENT
Start: 2021-04-11 | End: 2021-07-11

## 2021-05-13 ENCOUNTER — COMMUNICATION - HEALTHEAST (OUTPATIENT)
Dept: FAMILY MEDICINE | Facility: CLINIC | Age: 81
End: 2021-05-13

## 2021-05-13 DIAGNOSIS — I10 HYPERTENSION: ICD-10-CM

## 2021-05-13 DIAGNOSIS — F41.1 ANXIETY STATE: ICD-10-CM

## 2021-05-13 DIAGNOSIS — K20.90 ESOPHAGITIS: ICD-10-CM

## 2021-05-14 RX ORDER — HYDROXYZINE HYDROCHLORIDE 25 MG/1
TABLET, FILM COATED ORAL
Qty: 90 TABLET | Refills: 2 | Status: SHIPPED | OUTPATIENT
Start: 2021-05-14 | End: 2022-02-07

## 2021-05-14 RX ORDER — LOSARTAN POTASSIUM 100 MG/1
TABLET ORAL
Qty: 90 TABLET | Refills: 1 | Status: SHIPPED | OUTPATIENT
Start: 2021-05-14 | End: 2021-11-17

## 2021-05-17 ENCOUNTER — COMMUNICATION - HEALTHEAST (OUTPATIENT)
Dept: FAMILY MEDICINE | Facility: CLINIC | Age: 81
End: 2021-05-17

## 2021-05-17 DIAGNOSIS — K20.90 ESOPHAGITIS: ICD-10-CM

## 2021-05-25 ENCOUNTER — RECORDS - HEALTHEAST (OUTPATIENT)
Dept: ADMINISTRATIVE | Facility: CLINIC | Age: 81
End: 2021-05-25

## 2021-05-26 ENCOUNTER — RECORDS - HEALTHEAST (OUTPATIENT)
Dept: ADMINISTRATIVE | Facility: CLINIC | Age: 81
End: 2021-05-26

## 2021-05-27 ENCOUNTER — RECORDS - HEALTHEAST (OUTPATIENT)
Dept: ADMINISTRATIVE | Facility: CLINIC | Age: 81
End: 2021-05-27

## 2021-05-28 NOTE — TELEPHONE ENCOUNTER
RN cannot approve Refill Request    RN can NOT refill this medication Protocol failed and NO refill given. Last office visit: 2/12/2018 Carter Mai MD Last Physical: 10/10/2018 Last MTM visit: Visit date not found Last visit same specialty: 8/23/2018 Tuan Ng MD.  Next visit within 3 mo: Visit date not found  Next physical within 3 mo: Visit date not found      Karely Quach, [facility] Triage/Med Refill 5/17/2019    Requested Prescriptions   Pending Prescriptions Disp Refills     XARELTO 20 mg Tab [Pharmacy Med Name: XARELTO 20MG TABLETS] 90 tablet 0     Sig: TAKE 1 TABLET(20 MG) BY MOUTH DAILY WITH SUPPER       Apixaban/Rivaroxaban/Dabigatran Refill Protocol Failed - 5/17/2019  9:02 AM        Failed - Route to appropriate pool/provider     Last Anticoagulation Summary:           Passed - Renal function test in last year     Creatinine   Date Value Ref Range Status   10/10/2018 1.08 0.70 - 1.30 mg/dL Final             Passed - PCP or prescribing provider visit in past 12 months       Last office visit with prescriber/PCP: 2/12/2018 Carter Mai MD OR same dept: 8/23/2018 Tuan Ng MD OR same specialty: 8/23/2018 Tuan Ng MD  Last physical: 10/10/2018 Last MTM visit: Visit date not found   Next visit within 3 mo: Visit date not found  Next physical within 3 mo: Visit date not found  Prescriber OR PCP: Carter Mai MD  Last diagnosis associated with med order: 1. Pulmonary embolus (H)  - XARELTO 20 mg Tab [Pharmacy Med Name: XARELTO 20MG TABLETS]; TAKE 1 TABLET(20 MG) BY MOUTH DAILY WITH SUPPER  Dispense: 90 tablet; Refill: 0    If protocol passes may refill for 12 months if within 3 months of last provider visit (or a total of 15 months).

## 2021-05-29 NOTE — TELEPHONE ENCOUNTER
Controlled Substance Refill Request  Medication:   Requested Prescriptions     Pending Prescriptions Disp Refills     LORazepam (ATIVAN) 1 MG tablet [Pharmacy Med Name: LORAZEPAM 1MG TABLETS] 90 tablet 0     Sig: TAKE 1 TABLET BY MOUTH EVERY NIGHT AT BEDTIME     Date Last Fill: 2/25/19  [facility]    Submit electronically to pharmacy  Controlled Substance Agreement on File:   Encounter-Level CSA Scan Date - 08/17/2017:    Scan on 8/21/2017  1:31 PM: [facility] (below)         Last office visit: Last office visit pertaining to requested medication was 10/10/18.

## 2021-05-30 ENCOUNTER — RECORDS - HEALTHEAST (OUTPATIENT)
Dept: ADMINISTRATIVE | Facility: CLINIC | Age: 81
End: 2021-05-30

## 2021-05-30 VITALS — HEIGHT: 70 IN | BODY MASS INDEX: 26.17 KG/M2 | WEIGHT: 182.8 LBS

## 2021-05-30 VITALS — WEIGHT: 182 LBS | BODY MASS INDEX: 26.49 KG/M2

## 2021-05-30 NOTE — TELEPHONE ENCOUNTER
Refill Approved    Rx renewed per Medication Renewal Policy. Medication was last renewed on 8/23/2018 with 5 refills.   Last office visit: 10/10/2018 with PCP Dr LACHO Craven, [facility] Triage/Med Refill 7/24/2019     Requested Prescriptions   Pending Prescriptions Disp Refills     hydrOXYzine HCl (ATARAX) 25 MG tablet [Pharmacy Med Name: HYDROXYZINE HCL 25MG TABS] 60 tablet 0     Sig: TAKE 1 TABLET BY MOUTH AT BEDTIME       Antihistamine Refill Protocol Passed - 7/24/2019  3:58 AM        Passed - Patient has had office visit/physical in last year     Last office visit with prescriber/PCP: 8/23/2018 Tuan Ng MD OR same dept: 8/23/2018 Tuan Ng MD OR same specialty: 8/23/2018 Tuan Ng MD  Last physical: Visit date not found Last MTM visit: Visit date not found   Next visit within 3 mo: Visit date not found  Next physical within 3 mo: Visit date not found  Prescriber OR PCP: Tuan Ng MD  Last diagnosis associated with med order: 1. Anxiety  - hydrOXYzine HCl (ATARAX) 25 MG tablet [Pharmacy Med Name: HYDROXYZINE HCL 25MG TABS]; TAKE 1 TABLET BY MOUTH AT BEDTIME  Dispense: 60 tablet; Refill: 0    If protocol passes may refill for 12 months if within 3 months of last provider visit (or a total of 15 months).

## 2021-05-30 NOTE — PROGRESS NOTES
Chief Complaint   Patient presents with     Thrush     Has been going on for more than 2 weeks, bitter taste          HPI:   Angel Luis Boykin is a 78 y.o. male c/o bitter taste in mouth for the last two days.  No pain in mouth.  Feels like he's burned his mouth--top and front of tongue.  No white patches.    Initially in early June, had burning sensation in mouth and white patches on tongue.  Seen in urgency room and treated with nystatin for thrush for 7 days.  All symptoms resolved.    5 weeks later in [place] developed same symptoms and got more nystatin--dosed at a lower level for a week.  Symptoms resolved entirely but came back in two days.    No new medications--was prescribed medrol for back pain recently but didn't take.  No fevers.  No sores in mouth.  No recent colds.  Has allergies but no change in symptoms.  On daily loratadine.  No head congestion.  No post nasal drainage.  No coughing.  No stomach symptoms.  No rashes    Takes multivitamin daily.        ROS:  A 10 point comprehensive review of systems was negative except as noted.     Medications:  Current Outpatient Medications on File Prior to Visit   Medication Sig Dispense Refill     acetaminophen (TYLENOL) 500 MG tablet Take 500 mg by mouth every 6 (six) hours as needed for pain.       atorvastatin (LIPITOR) 40 MG tablet TAKE 1 TABLET BY MOUTH AT BEDTIME 90 tablet 3     hydrOXYzine HCl (ATARAX) 25 MG tablet Take 1 tablet (25 mg total) by mouth at bedtime. 90 tablet 0     loratadine 5 mg TbDL Take 10 mg by mouth daily as needed.        LORazepam (ATIVAN) 1 MG tablet TAKE 1 TABLET BY MOUTH EVERY NIGHT AT BEDTIME 90 tablet 0     losartan (COZAAR) 100 MG tablet TAKE 1 TABLET(100 MG) BY MOUTH DAILY 90 tablet 2     multivitamin (MEN'S MULTI-VITAMIN) per tablet Take 1 tablet by mouth daily.       tadalafil (CIALIS) 5 MG tablet Take 5 mg by mouth daily as needed for erectile dysfunction.       XARELTO 20 mg Tab TAKE 1 TABLET(20 MG) BY MOUTH DAILY WITH  "SUPPER 90 tablet 1     amLODIPine (NORVASC) 5 MG tablet Take 1 tablet (5 mg total) by mouth daily. 90 tablet 3     No current facility-administered medications on file prior to visit.          Social History:  Social History     Tobacco Use     Smoking status: Former Smoker     Smokeless tobacco: Never Used   Substance Use Topics     Alcohol use: Yes     Alcohol/week: 1.2 oz     Types: 2 Glasses of wine per week     Comment: 2 glasses wine/day         Physical Exam:   Vitals:    07/26/19 0918   BP: 122/66   Pulse: (!) 51   Resp: 16   Temp: 98  F (36.7  C)   TempSrc: Oral   SpO2: 99%   Weight: 181 lb (82.1 kg)   Height: 5' 9.25\" (1.759 m)       GENERAL:   Alert. Oriented.  EYES: Clear  HENT:  Ears: R TM pearly gray. Normal landmarks. L TM pearly gray.  Normal landmarks  Nose: Clear. No inflammation  Sinuses: Nontender.  Oropharynx:  No erythema. No exudate.  NECK: Supple. No adenopathy.  LUNGS: Clear to ascultation.  No crackles.  No wheezing  HEART: RRR  SKIN:  No rash.   ABDOMEN:  +BS. No tenderness. Soft, no guarding, rebound, rigidity,mass, or organomegaly. No CVA tenderness    LEGS:  No swelling.        Assessment/Plan:    1. Thrush  nystatin (MYCOSTATIN) 100,000 unit/mL suspension        By history.  He states his symptoms improved right away when he started taking the medication the first time.    Has no symptoms of head congestion that might explain olfactory disturbance.  No symptoms of GE reflux.    Nothing to suggest immunosuppressed state at this point.    Takes daily multivitamin so Vitamin B12 or zinc deficiency is less likely.  Did offer tests for these but he declined at this time.    Will give another course of nystatin for two weeks.  If symptoms do not resolve or return quickly, needs reevaluation.          Emily Coto MD      7/26/2019    The following portions of the patient's history were reviewed and updated as appropriate: allergies, current medications, past family history, past medical " history, past social history, past surgical history and problem list.

## 2021-05-31 ENCOUNTER — RECORDS - HEALTHEAST (OUTPATIENT)
Dept: ADMINISTRATIVE | Facility: CLINIC | Age: 81
End: 2021-05-31

## 2021-05-31 VITALS — WEIGHT: 183 LBS | HEIGHT: 70 IN | BODY MASS INDEX: 26.2 KG/M2

## 2021-05-31 VITALS — BODY MASS INDEX: 26.05 KG/M2 | WEIGHT: 179 LBS

## 2021-05-31 VITALS — HEIGHT: 70 IN | BODY MASS INDEX: 25.62 KG/M2 | WEIGHT: 179 LBS

## 2021-05-31 VITALS — WEIGHT: 180 LBS | BODY MASS INDEX: 26.2 KG/M2

## 2021-05-31 VITALS — WEIGHT: 182 LBS | HEIGHT: 70 IN | BODY MASS INDEX: 26.05 KG/M2

## 2021-05-31 NOTE — PROGRESS NOTES
Assessment/ Plan  1. Thrush  Possible/probable, JEFF ordered today.  Dental exam tomorrow.  Glucose from [facility] to screen for diabetes, no chance of HIV by his account.  Treat with nystatin empirically since this seems to help.  - nystatin (MYCOSTATIN) 100,000 unit/mL suspension; Take 5 mL (500,000 Units total) by mouth 4 (four) times a day for 14 days.  Dispense: 280 mL; Refill: 0  - Basic Metabolic Panel  - Zinc, Serum  - KOH Prep  2. Stomatitis and mucositis  Check zinc level and B12 level for alternate causes of stomatitis.  No likely offenders from medication list.  - Basic Metabolic Panel  - Zinc, Serum  - KOH Prep    3. Hypertension  Well-controlled on amlodipine 5 mg, losartan 100 mg.  Check BMP  - Basic Metabolic Panel      Body mass index is 26.58 kg/m .    Subjective  CC:  Chief Complaint   Patient presents with     Thrush     started in june, bitter taste, appetite okay     HPI:  78-year-old with history of hyperlipidemia, hypertension, prostate cancer, DVT with factor V Leiden, who takes amlodipine, atorvastatin, loratadine, lorazepam, losartan, Cialis and Xarelto presents with concern about thrush.  This is his third visit for mouth problems.  Has had bitter taste in his mouth on and off since June, last seen 7/26/2019.  Been prescribed nystatin each of these visits which seems to help, then the problem seems to come back.  He has not noticed much in the way of abnormal appearance of his mouth, but his daughter, who apparently is in healthcare, looked at it and thought he had thrush.  He sees the dentist tomorrow.  Patient Active Problem List   Diagnosis     Urinary Frequency More Than Twice At Night (Nocturia)     Carcinoma Of The Prostate Gland     Hyperlipidemia     Hypertension     Leg Localized Swelling Right     Anxiety     Ringing In The Ears (Tinnitus)     Hearing Loss     Tendonitis     Peripheral Neuropathy     Pulmonary embolus (H)     DVT (deep venous thrombosis) (H)     Factor V Leiden  mutation (H)     Prostate cancer (H)     Polyp of colon, adenomatous     Current medications reviewed as follows:  Current Outpatient Medications on File Prior to Visit   Medication Sig     acetaminophen (TYLENOL) 500 MG tablet Take 500 mg by mouth every 6 (six) hours as needed for pain.     amLODIPine (NORVASC) 5 MG tablet Take 1 tablet (5 mg total) by mouth daily.     atorvastatin (LIPITOR) 40 MG tablet TAKE 1 TABLET BY MOUTH AT BEDTIME     hydrOXYzine HCl (ATARAX) 25 MG tablet Take 1 tablet (25 mg total) by mouth at bedtime.     loratadine 5 mg TbDL Take 10 mg by mouth daily as needed.      LORazepam (ATIVAN) 1 MG tablet TAKE 1 TABLET BY MOUTH EVERY NIGHT AT BEDTIME     losartan (COZAAR) 100 MG tablet TAKE 1 TABLET(100 MG) BY MOUTH DAILY     multivitamin (MEN'S MULTI-VITAMIN) per tablet Take 1 tablet by mouth daily.     tadalafil (CIALIS) 5 MG tablet Take 5 mg by mouth daily as needed for erectile dysfunction.     XARELTO 20 mg tablet TAKE 1 TABLET(20 MG) BY MOUTH DAILY WITH SUPPER     [DISCONTINUED] nystatin (MYCOSTATIN) 100,000 unit/mL suspension Take 5 mL (500,000 Units total) by mouth 4 (four) times a day for 14 days.     No current facility-administered medications on file prior to visit.      Social History     Tobacco Use   Smoking Status Former Smoker   Smokeless Tobacco Never Used     Social History     Social History Narrative     Not on file     Patient Care Team:  Carter Mai MD as PCP - General  Angel Luis Rivas MD as Physician (Hematology and Oncology)  ROS  Full 10 system review including constitutional, respiratory, cardiac, gi, urinary, rheumatologic, neurologic, reproductive, dermatologic psychiatric is  performed  and is otherwise negative         Objective  Physical Exam  Vitals:    08/22/19 0854   BP: 108/70   Patient Site: Right Arm   Patient Position: Sitting   Cuff Size: Adult Large   Pulse: (!) 54   Resp: 16   Temp: 97.5  F (36.4  C)   TempSrc: Oral   Weight: 180 lb (81.6 kg)  "  Height: 5' 9\" (1.753 m)     Tongue is whitish in color, somewhat \"fuzzy\" but homogenous.  I would not get this to be abnormal.  He does have, on careful inspection, whitish bumps that do not scrape off on buccal mucosa, appears submucosal  Diagnostics  Pending    Please note: Voice recognition software was used in this dictation.  It may therefore contain typographical errors.    "

## 2021-05-31 NOTE — TELEPHONE ENCOUNTER
Controlled Substance Refill Request  Medication:   Requested Prescriptions     Pending Prescriptions Disp Refills     LORazepam (ATIVAN) 1 MG tablet 90 tablet 0     Sig: Take 1 tablet (1 mg total) by mouth at bedtime.     Date Last Fill: 5/29/19  Pharmacy: [id number]   Submit electronically to pharmacy  Controlled Substance Agreement on File:   Encounter-Level CSA Scan Date - 08/17/2017:    Scan on 8/21/2017  1:31 PM: [facility] (below)         Last office visit: Last office visit pertaining to requested medication was 8/22/19.

## 2021-05-31 NOTE — TELEPHONE ENCOUNTER
----- Message from Justen Monahan MD sent at 8/22/2019  2:50 PM CDT -----  These let Nicolas know that his test came back without them seeing any yeast or fungal elements.  As I mentioned to him, this does not rule out thrush but makes it somewhat less likely.

## 2021-05-31 NOTE — TELEPHONE ENCOUNTER
RN cannot approve Refill Request    RN can NOT refill this medication Protocol failed and NO refill given. Last office visit: 2/12/2018 Carter Mai MD Last Physical: 10/10/2018 Last MTM visit: Visit date not found Last visit same specialty: 7/26/2019 Emily Coto MD.  Next visit within 3 mo: Visit date not found  Next physical within 3 mo: Visit date not found      Shelia ABREU Radha, [facility] Triage/Med Refill 8/15/2019    Requested Prescriptions   Pending Prescriptions Disp Refills     XARELTO 20 mg tablet [Pharmacy Med Name: XARELTO 20MG TABLETS] 90 tablet 0     Sig: TAKE 1 TABLET(20 MG) BY MOUTH DAILY WITH SUPPER       Apixaban/Rivaroxaban/Dabigatran Refill Protocol Failed - 8/15/2019  7:36 AM        Failed - Route to appropriate pool/provider     Last Anticoagulation Summary:           Passed - Renal function test in last year     Creatinine   Date Value Ref Range Status   10/10/2018 1.08 0.70 - 1.30 mg/dL Final             Passed - PCP or prescribing provider visit in past 12 months       Last office visit with prescriber/PCP: 2/12/2018 Carter Mai MD OR same dept: 8/23/2018 Tuan Ng MD OR same specialty: 7/26/2019 Emily Coto MD  Last physical: 10/10/2018 Last MTM visit: Visit date not found   Next visit within 3 mo: Visit date not found  Next physical within 3 mo: Visit date not found  Prescriber OR PCP: Carter Mai MD  Last diagnosis associated with med order: 1. Pulmonary embolus (H)  - XARELTO 20 mg tablet [Pharmacy Med Name: XARELTO 20MG TABLETS]; TAKE 1 TABLET(20 MG) BY MOUTH DAILY WITH SUPPER  Dispense: 90 tablet; Refill: 0    If protocol passes may refill for 12 months if within 3 months of last provider visit (or a total of 15 months).

## 2021-05-31 NOTE — TELEPHONE ENCOUNTER
Refill Approved    Rx renewed per Medication Renewal Policy. Medication was last renewed on 9/4/18.    Darlene Gallo, [facility] Connection Triage/Med Refill 8/23/2019     Requested Prescriptions   Pending Prescriptions Disp Refills     atorvastatin (LIPITOR) 40 MG tablet [Pharmacy Med Name: ATORVASTATIN 40MG TABLETS] 90 tablet 0     Sig: TAKE 1 TABLET BY MOUTH AT BEDTIME       Statins Refill Protocol (Hmg CoA Reductase Inhibitors) Passed - 8/23/2019  3:59 AM        Passed - PCP or prescribing provider visit in past 12 months      Last office visit with prescriber/PCP: 2/12/2018 Carter Mai MD OR same dept: 8/22/2019 Justen Monahan MD OR same specialty: 8/22/2019 Justen Monahan MD  Last physical: 10/10/2018 Last MTM visit: Visit date not found   Next visit within 3 mo: Visit date not found  Next physical within 3 mo: Visit date not found  Prescriber OR PCP: Carter Mai MD  Last diagnosis associated with med order: 1. Hyperlipidemia  - atorvastatin (LIPITOR) 40 MG tablet [Pharmacy Med Name: ATORVASTATIN 40MG TABLETS]; TAKE 1 TABLET BY MOUTH AT BEDTIME  Dispense: 90 tablet; Refill: 0    If protocol passes may refill for 12 months if within 3 months of last provider visit (or a total of 15 months).

## 2021-06-01 ENCOUNTER — COMMUNICATION - HEALTHEAST (OUTPATIENT)
Dept: FAMILY MEDICINE | Facility: CLINIC | Age: 81
End: 2021-06-01

## 2021-06-01 VITALS — WEIGHT: 182 LBS | BODY MASS INDEX: 26.05 KG/M2 | HEIGHT: 70 IN

## 2021-06-01 VITALS — BODY MASS INDEX: 26.2 KG/M2 | WEIGHT: 183 LBS | HEIGHT: 70 IN

## 2021-06-01 DIAGNOSIS — K20.90 ESOPHAGITIS: ICD-10-CM

## 2021-06-02 VITALS — BODY MASS INDEX: 26.51 KG/M2 | WEIGHT: 179 LBS | HEIGHT: 69 IN

## 2021-06-02 NOTE — TELEPHONE ENCOUNTER
Refill Approved    Rx renewed per Medication Renewal Policy. Medication was last renewed on 7/24/19.    Darlene Gallo, [facility] Connection Triage/Med Refill 10/23/2019     Requested Prescriptions   Pending Prescriptions Disp Refills     hydrOXYzine HCl (ATARAX) 25 MG tablet [Pharmacy Med Name: HYDROXYZINE HCL 25MG TABS (WHITE)] 90 tablet 0     Sig: TAKE 1 TABLET(25 MG) BY MOUTH AT BEDTIME       Antihistamine Refill Protocol Passed - 10/23/2019  6:20 AM        Passed - Patient has had office visit/physical in last year     Last office visit with prescriber/PCP: 2/12/2018 Carter Mai MD OR same dept: 8/22/2019 Justen Monahan MD OR same specialty: 8/22/2019 Justen Monahan MD  Last physical: 10/14/2019 Last MTM visit: Visit date not found   Next visit within 3 mo: Visit date not found  Next physical within 3 mo: Visit date not found  Prescriber OR PCP: Carter Mai MD  Last diagnosis associated with med order: 1. Anxiety  - hydrOXYzine HCl (ATARAX) 25 MG tablet [Pharmacy Med Name: HYDROXYZINE HCL 25MG TABS (WHITE)]; TAKE 1 TABLET(25 MG) BY MOUTH AT BEDTIME  Dispense: 90 tablet; Refill: 0    If protocol passes may refill for 12 months if within 3 months of last provider visit (or a total of 15 months).

## 2021-06-02 NOTE — PATIENT INSTRUCTIONS - HE
Continue to see your dermatologist, ophthalmologist    You are due for colonoscopy October 2021    Continue to see urology.    Carotid ultrasound is pending he will be contacted with that as well as the lab tests.    Please stay hydrated try to drink 8 to 10 glasses of water a day.    Continue same medications.  You are prescribed some sildenafil for erectile dysfunction call if any problems.

## 2021-06-02 NOTE — PROGRESS NOTES
Subjective: Patient comes in for annual wellness visit.  He is a 79-year-old male.    He sees other physicians including dermatology, ophthalmology, gastroenterology, urology.    Also has seen ENT he had some thrush and went on to see a specialist.  He initially used some nystatin and more recently was on some pills question Mycelex or something else he did not bring him.    His exam was unremarkable through the oropharynx    He is on medications for blood pressure, amlodipine and losartan.  He also takes atorvastatin for lipids.    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    No additional concerns patient although he did want to talk regarding erectile dysfunction he is now in a relationship.  He said prostate cancer status post radical prostatectomy    We discussed Viagra 20 mg generic sildenafil 1-5 1 hour prior to sex.  Discussed risk benefit he wanted to try that    Flu shot was given today.    Patient had a colonoscopy 2016 he has a history of adenomatous colon polyps due again in October 2021.    For sleep he takes lorazepam and hydroxyzine.  He uses over-the-counter loratadine for allergies and takes a multivitamin daily    Also on vitamin D 1000 units daily    Labs today CMP CBC and lipid and vitamin D.    He gets PSA levels through the urologist, Dr. Dubois, PSA was less than 0.1    I did review the annual wellness visit he is in good health exercises regularly no concerns eats appropriately.  Does not need any help at home he does use hearing aid    Mild leakage from the urine from the prostate surgery    No concerns regarding depression or anxiety.  He does have a living well.    In a fall on ice no problems    Cognitive 5 out of 5        On review of systems he states that he has had some dizziness which happened a couple months ago has not had it since    He is worried about carotid stenosis.    We discussed additional symptoms that he does not have  any real anterior circulation problems.  He had a normal neurologic exam please see below there was no bruit.    Dizziness is more of an unsteadiness, rather than any syncopal or presyncopal symptoms.    We discussed hydration as well    Tobacco status: He  reports that he has quit smoking. He has never used smokeless tobacco.    Patient Active Problem List    Diagnosis Date Noted     Polyp of colon, adenomatous 10/22/2016     Factor V Leiden mutation (H) 06/28/2016     Prostate cancer (H) 06/28/2016     Pulmonary embolus (H) 02/17/2015     DVT (deep venous thrombosis) (H) 02/17/2015     Peripheral Neuropathy      Urinary Frequency More Than Twice At Night (Nocturia)      Carcinoma Of The Prostate Gland      Hyperlipidemia      Hypertension      Leg Localized Swelling Right      Anxiety      Ringing In The Ears (Tinnitus)      Hearing Loss      Tendonitis        Current Outpatient Medications   Medication Sig Dispense Refill     acetaminophen (TYLENOL) 500 MG tablet Take 500 mg by mouth every 6 (six) hours as needed for pain.       atorvastatin (LIPITOR) 40 MG tablet TAKE 1 TABLET BY MOUTH AT BEDTIME 90 tablet 3     hydrOXYzine HCl (ATARAX) 25 MG tablet Take 1 tablet (25 mg total) by mouth at bedtime. 90 tablet 0     loratadine 5 mg TbDL Take 10 mg by mouth daily as needed.        LORazepam (ATIVAN) 1 MG tablet Take 1 tablet (1 mg total) by mouth at bedtime. 90 tablet 0     losartan (COZAAR) 100 MG tablet TAKE 1 TABLET(100 MG) BY MOUTH DAILY 90 tablet 2     multivitamin (MEN'S MULTI-VITAMIN) per tablet Take 1 tablet by mouth daily.       XARELTO 20 mg tablet TAKE 1 TABLET(20 MG) BY MOUTH DAILY WITH SUPPER 90 tablet 1     amLODIPine (NORVASC) 5 MG tablet Take 1 tablet (5 mg total) by mouth daily. 90 tablet 3     sildenafil (REVATIO) 20 mg tablet 1-5 po 1 hour prior to sex as needed 100 tablet 1     No current facility-administered medications for this visit.        ROS:   10 point review of systems positive as  "outlined above    Objective:    /72 (Patient Site: Left Arm, Patient Position: Sitting, Cuff Size: Adult Regular)   Pulse (!) 56   Temp 96.9  F (36.1  C) (Oral)   Resp 16   Ht 5' 10.25\" (1.784 m)   Wt 182 lb (82.6 kg)   BMI 25.93 kg/m    Body mass index is 25.93 kg/m .    General appearance no acute distress    Pupils react normally extract movements full canals and TMs normal  Oropharynx without abnormality and do not see any inflammation presently.  Lungs clear no rales or rhonchi heart was regular S1-S2, rate is in the 60 range.    Neck is supple no adenopathy or bruit    Abdomen nontender no guarding or rebound no axillary or inguinal    Lower extremities without swelling redness or warmth.    Genitalia normal descended testes no evidence of hernia, rectal was done status post radical prostatectomy no signs of recurrent prostate    No masses in through the rectum.    Skin was normal no rashes normal pulses.    Neurologic exam cranial nerves intact gait normal no focal weakness or numbness.        Lab work and CMP normal     CBC normal    Vitamin D level pending      Results for orders placed or performed in visit on 10/14/19   Comprehensive Metabolic Panel   Result Value Ref Range    Sodium 142 136 - 145 mmol/L    Potassium 3.9 3.5 - 5.0 mmol/L    Chloride 106 98 - 107 mmol/L    CO2 25 22 - 31 mmol/L    Anion Gap, Calculation 11 5 - 18 mmol/L    Glucose 96 70 - 125 mg/dL    BUN 24 8 - 28 mg/dL    Creatinine 1.19 0.70 - 1.30 mg/dL    GFR MDRD Af Amer >60 >60 mL/min/1.73m2    GFR MDRD Non Af Amer 59 (L) >60 mL/min/1.73m2    Bilirubin, Total 0.8 0.0 - 1.0 mg/dL    Calcium 9.2 8.5 - 10.5 mg/dL    Protein, Total 7.0 6.0 - 8.0 g/dL    Albumin 4.2 3.5 - 5.0 g/dL    Alkaline Phosphatase 58 45 - 120 U/L    AST 23 0 - 40 U/L    ALT 25 0 - 45 U/L   Lipid Cascade FASTING   Result Value Ref Range    Cholesterol 194 <=199 mg/dL    Triglycerides 174 (H) <=149 mg/dL    HDL Cholesterol 59 >=40 mg/dL    LDL " Calculated 100 <=129 mg/dL    Patient Fasting > 8hrs? Yes    HM2(CBC w/o Differential)   Result Value Ref Range    WBC 5.0 4.0 - 11.0 thou/uL    RBC 4.82 4.40 - 6.20 mill/uL    Hemoglobin 15.9 14.0 - 18.0 g/dL    Hematocrit 46.2 40.0 - 54.0 %    MCV 96 80 - 100 fL    MCH 32.9 27.0 - 34.0 pg    MCHC 34.4 32.0 - 36.0 g/dL    RDW 12.3 11.0 - 14.5 %    Platelets 155 140 - 440 thou/uL    MPV 7.7 7.0 - 10.0 fL       Assessment:  1. Medicare annual wellness visit, subsequent     2. Pulmonary embolism, other, unspecified chronicity, unspecified whether acute cor pulmonale present (H)     3. Hyperlipidemia, unspecified hyperlipidemia type  Comprehensive Metabolic Panel    Lipid Cascade FASTING   4. Hypertension  Comprehensive Metabolic Panel   5. Adenomatous polyp of colon, unspecified part of colon  HM2(CBC w/o Differential)   6. Factor V Leiden mutation (H)     7. Prostate cancer (H)     8. Stomatitis and mucositis     9. Vitamin D deficiency  Vitamin D, Total (25-Hydroxy)   10. Erectile dysfunction, unspecified erectile dysfunction type  sildenafil (REVATIO) 20 mg tablet   11. Dizziness  US Carotid Bilateral   12. Needs flu shot  Influenza High Dose,Seasonal,PF 65+ Yrs     Annual wellness visit stable    History of pulmonary embolism and factor V Leyden mutation continue Xarelto    Hyperlipidemia controlled on atorvastatin    Adenomatous colon polyps recheck October 2021 with colonoscopy    Prostate cancer follows with urology, PSA levels have been less than 0.1 and no regrowth on exam.    Erectile dysfunction please see above discussion we will try his sildenafil 20 mg 1 to 5 tablets as needed, discussed risk benefit and side effects.    Carotid ultrasound pending    Plan: As outlined above he will be contacted regarding results    This transcription uses voice recognition software, which may contain typographical errors.

## 2021-06-02 NOTE — TELEPHONE ENCOUNTER
Refill Approved    Rx renewed per Medication Renewal Policy. Medication was last renewed on 1/27/19.    Darlene Gallo, [facility] [place] Connection Triage/Med Refill 10/18/2019     Requested Prescriptions   Pending Prescriptions Disp Refills     losartan (COZAAR) 100 MG tablet [Pharmacy Med Name: LOSARTAN 100MG TABLETS] 90 tablet 0     Sig: TAKE 1 TABLET(100 MG) BY MOUTH DAILY       Angiotensin Receptor Blocker Protocol Passed - 10/18/2019  9:54 AM        Passed - PCP or prescribing provider visit in past 12 months       Last office visit with prescriber/PCP: 2/12/2018 Carter Mai MD OR same dept: 8/22/2019 Justen Monahan MD OR same specialty: 8/22/2019 Justen Monahan MD  Last physical: 10/14/2019 Last MTM visit: Visit date not found   Next visit within 3 mo: Visit date not found  Next physical within 3 mo: Visit date not found  Prescriber OR PCP: Carter Mai MD  Last diagnosis associated with med order: 1. Hypertension  - losartan (COZAAR) 100 MG tablet [Pharmacy Med Name: LOSARTAN 100MG TABLETS]; TAKE 1 TABLET(100 MG) BY MOUTH DAILY  Dispense: 90 tablet; Refill: 0    If protocol passes may refill for 12 months if within 3 months of last provider visit (or a total of 15 months).             Passed - Serum potassium within the past 12 months     Lab Results   Component Value Date    Potassium 3.9 10/14/2019             Passed - Blood pressure filed in past 12 months     BP Readings from Last 1 Encounters:   10/14/19 128/72             Passed - Serum creatinine within the past 12 months     Creatinine   Date Value Ref Range Status   10/14/2019 1.19 0.70 - 1.30 mg/dL Final

## 2021-06-03 VITALS — HEIGHT: 69 IN | BODY MASS INDEX: 26.81 KG/M2 | WEIGHT: 181 LBS

## 2021-06-03 VITALS
BODY MASS INDEX: 26.05 KG/M2 | HEART RATE: 56 BPM | HEIGHT: 70 IN | WEIGHT: 182 LBS | TEMPERATURE: 96.9 F | DIASTOLIC BLOOD PRESSURE: 72 MMHG | SYSTOLIC BLOOD PRESSURE: 128 MMHG | RESPIRATION RATE: 16 BRPM

## 2021-06-03 VITALS — BODY MASS INDEX: 26.66 KG/M2 | WEIGHT: 180 LBS | HEIGHT: 69 IN

## 2021-06-03 NOTE — TELEPHONE ENCOUNTER
Controlled Substance Refill Request  Medication:   Requested Prescriptions     Pending Prescriptions Disp Refills     LORazepam (ATIVAN) 1 MG tablet 90 tablet 0     Sig: Take 1 tablet (1 mg total) by mouth at bedtime.     Date Last Fill: 8/31/19  [facility]   Submit electronically to pharmacy  Controlled Substance Agreement on File:   Encounter-Level CSA Scan Date - 08/17/2017:    Scan on 8/21/2017  1:31 PM: [facility]       Last office visit: Visit date not found    Shelia Garcia RN [facility] Connection Triage/Med Refill 12/2/2019 1:30 PM

## 2021-06-05 VITALS
DIASTOLIC BLOOD PRESSURE: 70 MMHG | HEART RATE: 61 BPM | SYSTOLIC BLOOD PRESSURE: 140 MMHG | RESPIRATION RATE: 16 BRPM | WEIGHT: 180 LBS | BODY MASS INDEX: 25.83 KG/M2 | OXYGEN SATURATION: 98 %

## 2021-06-05 VITALS
WEIGHT: 179 LBS | DIASTOLIC BLOOD PRESSURE: 72 MMHG | RESPIRATION RATE: 15 BRPM | HEIGHT: 70 IN | BODY MASS INDEX: 25.62 KG/M2 | HEART RATE: 58 BPM | TEMPERATURE: 97 F | SYSTOLIC BLOOD PRESSURE: 132 MMHG

## 2021-06-05 VITALS — WEIGHT: 179 LBS | HEIGHT: 70 IN | BODY MASS INDEX: 25.62 KG/M2

## 2021-06-06 NOTE — TELEPHONE ENCOUNTER
RN cannot approve Refill Request    RN can NOT refill this medication Protocol failed and NO refill given.       Darlene Gallo, Care Connection Triage/Med Refill 3/14/2020    Requested Prescriptions   Pending Prescriptions Disp Refills     XARELTO 20 mg tablet [Pharmacy Med Name: XARELTO 20MG TABLETS] 90 tablet 1     Sig: TAKE 1 TABLET(20 MG) BY MOUTH DAILY WITH SUPPER       Apixaban/Rivaroxaban/Dabigatran Refill Protocol Failed - 3/11/2020  1:50 PM        Failed - Route to appropriate pool/provider     Last Anticoagulation Summary:           Passed - Renal function test in last year     Creatinine   Date Value Ref Range Status   10/14/2019 1.19 0.70 - 1.30 mg/dL Final             Passed - PCP or prescribing provider visit in past 12 months       Last office visit with prescriber/PCP: 2/12/2018 Carter Mai MD OR same dept: 8/22/2019 Justen Monahan MD OR same specialty: 8/22/2019 Justen Monahan MD  Last physical: 10/14/2019 Last MTM visit: Visit date not found   Next visit within 3 mo: Visit date not found  Next physical within 3 mo: Visit date not found  Prescriber OR PCP: Carter Mai MD  Last diagnosis associated with med order: 1. Pulmonary embolus (H)  - XARELTO 20 mg tablet [Pharmacy Med Name: XARELTO 20MG TABLETS]; TAKE 1 TABLET(20 MG) BY MOUTH DAILY WITH SUPPER  Dispense: 90 tablet; Refill: 1    If protocol passes may refill for 12 months if within 3 months of last provider visit (or a total of 15 months).

## 2021-06-06 NOTE — TELEPHONE ENCOUNTER
Yes she can take a Medrol Dosepak.  Follow the directions.  You should take 6 the first day 5 the next day 4 the next day 3 the next day 2 the next day 1 the next day and then stop    This is definitely different than the Z-Kumar.  A Z-Kumar is a azithromycin which is an antibiotic that you take to the first day then 1 a day for 4 days.  That is very different from Medrol Dosepak

## 2021-06-06 NOTE — TELEPHONE ENCOUNTER
Controlled Substance Refill Request  Medication Name:   Requested Prescriptions     Pending Prescriptions Disp Refills     LORazepam (ATIVAN) 1 MG tablet [Pharmacy Med Name: LORAZEPAM 1MG TABLETS] 90 tablet 0     Sig: TAKE 1 TABLET(1 MG) BY MOUTH AT BEDTIME     Date Last Fill:   LORazepam (ATIVAN) 1 MG tablet  90 tablet  0  12/2/2019   No    Sig - Route: Take 1 tablet (1 mg total) by mouth at bedtime. - Oral    Sent to pharmacy as: LORazepam 1 mg tablet (ATIVAN)    E-Prescribing Status: Receipt confirmed by pharmacy (12/2/2019  3:49 PM CST)    Requested [facility]  Submit electronically to pharmacy  Controlled Substance Agreement on file:   Encounter-Level CSA Scan Date - 08/17/2017:    Scan on 8/21/2017  1:31 PM: [facility]        Last office visit:  10/14/19

## 2021-06-08 NOTE — PROGRESS NOTES
"Angel Luis Boykin is a 79 y.o. male who is being evaluated via a billable video visit.      The patient has been notified of following:     \"This video visit will be conducted via a call between you and your physician/provider. We have found that certain health care needs can be provided without the need for an in-person physical exam.  This service lets us provide the care you need with a video conversation.  If a prescription is necessary we can send it directly to your pharmacy.  If lab work is needed we can place an order for that and you can then stop by our lab to have the test done at a later time.    Video visits are billed at different rates depending on your insurance coverage. Please reach out to your insurance provider with any questions.    If during the course of the call the physician/provider feels a video visit is not appropriate, you will not be charged for this service.\"    Patient has given verbal consent to a Video visit? Yes    Patient would like to receive their AVS by AVS Preference: Asaf.    Patient would like the video invitation sent by: Text to cell phone: [phone]     Will anyone else be joining your video visit? No        Video Start Time: 4:45 pm    Additional provider notes:   Subjective:    No covid sx or known exposure    pcr was neg in April    Sees dr rico for prostate ca   No new sx  psa has been<0.1    Continues on xarelto  Factor V leiden     Losartan  atorv amlodipine     For sleep loorazepam and hydroxyzine   Effective on present dosage    Oct  Had labs vit d cmp cbc all ok    Some present heartburn and phlegm   Discussed options and sx   elkected to use omeprazole    Consider egd etc    No chest pain or sob        Tobacco status: He  reports that he has quit smoking. He has never used smokeless tobacco.    Patient Active Problem List    Diagnosis Date Noted     Polyp of colon, adenomatous 10/22/2016     Factor V Leiden mutation (H) 06/28/2016     Prostate cancer (H) " 06/28/2016     Pulmonary embolus (H) 02/17/2015     DVT (deep venous thrombosis) (H) 02/17/2015     Peripheral Neuropathy      Urinary Frequency More Than Twice At Night (Nocturia)      Carcinoma Of The Prostate Gland      Hyperlipidemia      Hypertension      Leg Localized Swelling Right      Anxiety      Ringing In The Ears (Tinnitus)      Hearing Loss      Tendonitis        Current Outpatient Medications   Medication Sig Dispense Refill     acetaminophen (TYLENOL) 500 MG tablet Take 500 mg by mouth every 6 (six) hours as needed for pain.       amLODIPine (NORVASC) 5 MG tablet TAKE 1 TABLET(5 MG) BY MOUTH DAILY 90 tablet 3     atorvastatin (LIPITOR) 40 MG tablet TAKE 1 TABLET BY MOUTH AT BEDTIME 90 tablet 3     hydrOXYzine HCl (ATARAX) 25 MG tablet TAKE 1 TABLET(25 MG) BY MOUTH AT BEDTIME 90 tablet 3     loratadine 5 mg TbDL Take 10 mg by mouth daily as needed.        LORazepam (ATIVAN) 1 MG tablet Take 1 tablet (1 mg total) by mouth at bedtime. 90 tablet 0     losartan (COZAAR) 100 MG tablet TAKE 1 TABLET(100 MG) BY MOUTH DAILY 90 tablet 3     multivitamin (MEN'S MULTI-VITAMIN) per tablet Take 1 tablet by mouth daily.       sildenafil (REVATIO) 20 mg tablet 1-5 po 1 hour prior to sex as needed 100 tablet 1     XARELTO 20 mg tablet TAKE 1 TABLET(20 MG) BY MOUTH DAILY WITH SUPPER 90 tablet 1     methylPREDNISolone (MEDROL DOSEPACK) 4 mg tablet follow package directions 21 tablet 0     omeprazole (PRILOSEC) 20 MG capsule 1 po daily 30 capsule 2     No current facility-administered medications for this visit.        ROS:   10 pt ros neg other than as outlined above    Objective:    There were no vitals taken for this visit.  There is no height or weight on file to calculate BMI.      gen appearance  No acute distress    heent  Neck supple   No adenop   No icterus    Lungs  Non labored breathing   No wheezing no hemoptysis    Heart w/o palpit    No leg swelling no calf pain    abd  Denies epigastric pain    Skin w/o  rash    Has intermittent lbp in to the upper legs  Not related to activity    Results for orders placed or performed in visit on 10/14/19   Comprehensive Metabolic Panel   Result Value Ref Range    Sodium 142 136 - 145 mmol/L    Potassium 3.9 3.5 - 5.0 mmol/L    Chloride 106 98 - 107 mmol/L    CO2 25 22 - 31 mmol/L    Anion Gap, Calculation 11 5 - 18 mmol/L    Glucose 96 70 - 125 mg/dL    BUN 24 8 - 28 mg/dL    Creatinine 1.19 0.70 - 1.30 mg/dL    GFR MDRD Af Amer >60 >60 mL/min/1.73m2    GFR MDRD Non Af Amer 59 (L) >60 mL/min/1.73m2    Bilirubin, Total 0.8 0.0 - 1.0 mg/dL    Calcium 9.2 8.5 - 10.5 mg/dL    Protein, Total 7.0 6.0 - 8.0 g/dL    Albumin 4.2 3.5 - 5.0 g/dL    Alkaline Phosphatase 58 45 - 120 U/L    AST 23 0 - 40 U/L    ALT 25 0 - 45 U/L   Lipid Cascade FASTING   Result Value Ref Range    Cholesterol 194 <=199 mg/dL    Triglycerides 174 (H) <=149 mg/dL    HDL Cholesterol 59 >=40 mg/dL    LDL Calculated 100 <=129 mg/dL    Patient Fasting > 8hrs? Yes    HM2(CBC w/o Differential)   Result Value Ref Range    WBC 5.0 4.0 - 11.0 thou/uL    RBC 4.82 4.40 - 6.20 mill/uL    Hemoglobin 15.9 14.0 - 18.0 g/dL    Hematocrit 46.2 40.0 - 54.0 %    MCV 96 80 - 100 fL    MCH 32.9 27.0 - 34.0 pg    MCHC 34.4 32.0 - 36.0 g/dL    RDW 12.3 11.0 - 14.5 %    Platelets 155 140 - 440 thou/uL    MPV 7.7 7.0 - 10.0 fL   Vitamin D, Total (25-Hydroxy)   Result Value Ref Range    Vitamin D, Total (25-Hydroxy) 45.3 30.0 - 80.0 ng/mL       Assessment:  1. Bilateral sciatica  methylPREDNISolone (MEDROL DOSEPACK) 4 mg tablet   2. Pulmonary embolism, other, unspecified chronicity, unspecified whether acute cor pulmonale present (H)     3. Hyperlipidemia, unspecified hyperlipidemia type     4. Hypertension     5. Adenomatous polyp of colon, unspecified part of colon     6. Factor V Leiden mutation (H)     7. Prostate cancer (H)     8. Esophagitis  omeprazole (PRILOSEC) 20 MG capsule     Medrol for sciatica    Stable on xarelto    Lipids  controlled    htn controlled    F/u with urology for prostate ca    esopgagitis sx use omeprazole    Plan:  As above   Recheck 3-4 mo    This transcription uses voice recognition software, which may contain typographical errors.      Video-Visit Details    Type of service:  Video Visit    Video End Time (time video stopped): 5:07  Originating Location (pt. Location): Home    Distant Location (provider location):  [facility]     Platform used for Video Visit: Ilan Mai MD

## 2021-06-09 NOTE — PROGRESS NOTES
Subjective: This patient comes in for evaluation is a 76-year-old male.    Patient was seen on 6/28/2016 had some low back pain into the right groin felt to be more of a radicular problem was treated with Medrol did improve.    Patient's had some symptoms now in through the buttocks down the leg posteriorly to the knee he did have positive straight leg raising some diminished reflexes bilaterally.    Patient was treated again with Medrol today if not improved will go on for an MRI.    Tobacco status: He  reports that he has quit smoking. He does not have any smokeless tobacco history on file.    Patient Active Problem List    Diagnosis Date Noted     Polyp of colon, adenomatous 10/22/2016     Factor V Leiden mutation 06/28/2016     Prostate cancer 06/28/2016     Pulmonary embolus 02/17/2015     DVT (deep venous thrombosis) 02/17/2015     Peripheral Neuropathy      Urinary Frequency More Than Twice At Night (Nocturia)      Carcinoma Of The Prostate Gland      Hyperlipidemia      Hypertension      Leg Localized Swelling Right      Anxiety      Ringing In The Ears (Tinnitus)      Hearing Loss      Tendonitis        Current Outpatient Prescriptions   Medication Sig Dispense Refill     atorvastatin (LIPITOR) 40 MG tablet TAKE 1 TABLET BY MOUTH AT BEDTIME 90 tablet 2     loratadine 5 mg TbDL Take 10 mg by mouth daily as needed.        LORazepam (ATIVAN) 0.5 MG tablet Take 2 pills at night prn 120 tablet 0     multivitamin (MEN'S MULTI-VITAMIN) per tablet Take 1 tablet by mouth daily.       niacin (NIASPAN) 750 MG CR tablet TAKE 1 TABLET BY MOUTH AT BEDTIME 30 tablet 5     nystatin (MYCOSTATIN) ointment Apply to rash bid 30 g 3     tadalafil (CIALIS) 5 MG tablet Take 5 mg by mouth daily as needed for erectile dysfunction.       XARELTO 20 mg Tab TAKE 1 TABLET BY MOUTH AFTER DINNER 90 tablet 0     methylPREDNISolone (MEDROL DOSEPACK) 4 mg tablet follow package directions 21 tablet 0     No current facility-administered  medications for this visit.        ROS: Review of systems negative other than as outlined above    Objective:    /70 (Patient Site: Left Arm, Patient Position: Sitting, Cuff Size: Adult Large)  Pulse (!) 58  Resp 16  There is no height or weight on file to calculate BMI.      General appearance no acute distress vital signs are stable    Tenderness in the paraspinal muscles gets the pain radiating down the buttocks to the leg posteriorly through the thigh to the knee laterally.  Straight leg raising was positive as outlined    Pulses normal skin was normal no redness or warmth.    Lungs clear no rales or rhonchi heart regular S1-S2    Assessment:  1. Lumbar radiculopathy  methylPREDNISolone (MEDROL DOSEPACK) 4 mg tablet    MR Lumbar Spine Without Contrast     I did treat the patient with a Medrol pack he did call back without improvement so he went on for an MRI referral will await that result    Plan: As outlined above    This transcription uses voice recognition software, which may contain typographical errors.

## 2021-06-09 NOTE — PROGRESS NOTES
Assessment/Plan:    Diagnoses and all orders for this visit:    Right Lumbar radiculitis  Comments:  L4  Orders:  -     Ambulatory referral to Physical Medicine Rehab  -     Discontinue: methocarbamol (ROBAXIN) 500 MG tablet; Take 1 tablet (500 mg total) by mouth 3 (three) times a day.  Dispense: 90 tablet; Refill: 2  -     methocarbamol (ROBAXIN) 500 MG tablet; Take 1 tablet (500 mg total) by mouth at bedtime.  Dispense: 30 tablet; Refill: 2    Lumbar facet arthropathy  -     Ambulatory referral to Physical Medicine Rehab    bilateral IT band syndrome  -     Ambulatory referral to Physical Medicine Rehab  -     Discontinue: methocarbamol (ROBAXIN) 500 MG tablet; Take 1 tablet (500 mg total) by mouth 3 (three) times a day.  Dispense: 90 tablet; Refill: 2  -     methocarbamol (ROBAXIN) 500 MG tablet; Take 1 tablet (500 mg total) by mouth at bedtime.  Dispense: 30 tablet; Refill: 2    Insomnia  -     Discontinue: methocarbamol (ROBAXIN) 500 MG tablet; Take 1 tablet (500 mg total) by mouth 3 (three) times a day.  Dispense: 90 tablet; Refill: 2  -     methocarbamol (ROBAXIN) 500 MG tablet; Take 1 tablet (500 mg total) by mouth at bedtime.  Dispense: 30 tablet; Refill: 2        Discussion:  This is a 76 y.o. male with medical history significant of DVT/pulmonary embolism on Xarelto, factor V Leiden mutation, history of prostate cancer/prostatectomy, anxiety, insomnia, hyperlipidemia (on simvastatin), peripheral neuropathy, hypertension who presents today for a new patient evaluation of 3 weeks right-sided lower back pain that radiates into the right posterior thigh and right groin.  Patient has a negative right hip x-ray.  MRI lumbar spine related changes, mild facet arthropathy and multilevel neuroforaminal stenosis that could be contributing to some of his right groin and posterior thigh pain.  His symptoms consistent with right L3 radiculitis.  He has right iliotibial syndrome that is contributed from the right  groin and posterior leg pain.  We will do a trial of conservative care that consists of physical therapy, prescription of 400 mg Robaxin and recommend the patient to take extended release Tylenol for his pain.    MANSOOR: 24  WHO-F: 20    PLAN:    This was a shared treatment plan.  Patient fully understands the nature of the problem.      DIAGNOSTIC:  I reviewed the [facility] imaging of the MRI of the lumbar and right hip x-ray with the patient.    INTERVENTIONAL PAIN MANAGEMENT:    If he does not benefit from conservative care, right L3-L4 TFESI may be beneficial for the patient.    PHYSICAL THERAPY AND CHIROPRACTIC CARE:    Patient will go to Physical therapy at [facility] in [place] for 4-6 weeks.    MEDICATIONS:    Recommended patient to try over-the-counter Tylenol arthritis to be taken every 8 hours as needed for pain.  Prescribing 500 mg Robaxin 1 tablet at bedtime as needed for insomnia and muscle pain.  Recommend 3 mg of melatonin at bedtime which he can take 1 or 2 tablets as needed.    PATIENT EDUCATION:  Educated the patient on his condition and treatment plan.    The patient is in agreement with the above plan. All questions were answered.    FOLLOW-UP:   Patient will follow up in 6 weeks after starting physical therapy for office visit.  Patient come in sooner if any worsening symptoms.    40 minutes of total visit time was spent face to face with the patient today 60% of the visit was spent on counseling, education, and coordinating care.     This note has been dictated using voice recognition software. Any grammatical or context distortions are unintentional and inherent to the software         Gera Adams DC, MSPAS, PA-C         History of Present Ilness:   Angel Luis Boykin is a 76 y.o. male comes to [facility] for an initial evaluation of lower back pain with radicular right posterior thigh and groin pain upon the requests of Carter Mai MD.  He reports that a year ago he had  right sided lower back pain that radiate down the right posterior thigh that subsided after taking a course of oral steroids and exercise.  Now, he started having flareup of the right lower back pain that radiates into the right groin and right posterior thigh for about 3 weeks.  He reports that his pain is a better because he has been using a heating pad and decrease his activity.  Today, his pain is 2/10 of the back in radiation into the groin and thigh.  3 weeks ago, the pain was 8/10.  He reports his pain is worse with walking.  Pain is better with Tylenol, heating pad and decreased activity.  He did have one episode of pain radiating down the right lateral leg.  He denies any urinary/bowel incontinence or retention.  He reports that his PCP prescribed another dose of Medrol Dosepak without any help of his pain.  His PCP order a pelvis x-ray, right hip x-ray and lumbar MRI.    PAST MEDICAL HISTORY:    Past Medical History:   Diagnosis Date     Anxiety     Created by Conversion      Carcinoma Of The Prostate Gland     Created by Conversion      Hearing Loss     Created by Conversion      Hyperlipidemia     Created by Conversion      Hypertension     Created by Conversion      Leg Localized Swelling Right     Created by Conversion      PE (pulmonary thromboembolism)     Blood clot in lungs     Peripheral Neuropathy     Created by Conversion      Ringing In The Ears (Tinnitus)     Created by Conversion      Tendonitis     Created by Conversion [facility] Annotation: Sep 19 2008 11:28Bonny Noland: Biceps      Urinary Frequency More Than Twice At Night (Nocturia)     Created by Conversion        Past Surgical History:   Procedure Laterality Date     PROSTATECTOMY         Current Outpatient Prescriptions on File Prior to Encounter   Medication Sig Dispense Refill     atorvastatin (LIPITOR) 40 MG tablet TAKE 1 TABLET BY MOUTH AT BEDTIME 90 tablet 2     loratadine 5 mg TbDL Take 10 mg by mouth daily as  needed.        LORazepam (ATIVAN) 0.5 MG tablet Take 2 pills at night prn 120 tablet 0     multivitamin (MEN'S MULTI-VITAMIN) per tablet Take 1 tablet by mouth daily.       niacin (NIASPAN) 750 MG CR tablet TAKE 1 TABLET BY MOUTH AT BEDTIME 30 tablet 0     nystatin (MYCOSTATIN) ointment Apply to rash bid 30 g 3     tadalafil (CIALIS) 5 MG tablet Take 5 mg by mouth daily as needed for erectile dysfunction.       XARELTO 20 mg Tab TAKE 1 TABLET BY MOUTH AFTER DINNER 90 tablet 0     methylPREDNISolone (MEDROL DOSEPACK) 4 mg tablet follow package directions 21 tablet 0     No current facility-administered medications on file prior to encounter.        FAMILY MEDICAL HISTORY:  family history includes CABG in his mother; Heart disease in his father, mother, sister, sister, sister, and sister; Stroke in his father.    SOCIAL HISTORY:   Social History   Substance Use Topics     Smoking status: Former Smoker     Smokeless tobacco: None     Alcohol use 1.2 oz/week     2 Glasses of wine per week      Comment: 2 glasses wine/day     He is a .  He is retired.    Exercise history: Before his flareup, he goes on walks miles.      REVIEW OF SYMPTOMS:  Constitutional: Patient suffer from insomnia.  Patient is requesting any medication to help him sleep.  He tried Ambien without improvement in his sleep. He denies of any fever, night sweats, chills or unexplained weight loss.  Eyes: Negative  Respiratory: Negative  Cardiovascular: Negative  Gastrointestinal: Negative  Musculoskeletal: Lower back pain with radicular groin and posterior thigh pain  Skin: Negative  Neurological: Negative  Psychological: Negative  All other systems reviewed and are negative.    EVALUATION TO DATE:    1) [facility]  MR LUMBAR SPINE WO CONTRAST  3/31/2017 7:58 AM      INDICATION: Lumbar radiculopathy. Right hip pain.  TECHNIQUE: Routine.  CONTRAST: None  COMPARISON: Lumbar radiographs 06/28/2016     FINDINGS: Nomenclature is based on 5  lumbar type vertebral bodies. S-shaped thoracolumbar curvature, with dextrocurvature centered at L1-L2 and lower lumbar levocurvature centered at L4-L5 similar to findings on the previous radiographs. 3 mm   retrolisthesis at L2-L3 and minimal retrolisthesis at L3-L4 and L4-L5. 2 mm anterolisthesis at L5-S1. Preserved vertebral body heights. Prominent vertebral hemangioma within the L3 vertebral body. Mild Modic type I endplate edema posteriorly at L1-L2. No  pars defect. The conus tip is identified at upper L2. Mild paraspinal muscular atrophy. No abdominal aortic aneurysm. The visualized portions of the bony pelvis are normal for age.     T12-L1: Mild to moderate loss of disc and in signal. Slight annular bulge with shallow left central/subarticular disc protrusion. Minimal facet arthropathy. No spinal canal stenosis. No right neural foraminal stenosis. No left neural foraminal stenosis.     L1-L2: Moderate loss of disc height and signal. Mild circumferential disc bulge with asymmetric left foraminal and lateral disc osteophyte complex. Mild facet arthropathy. Low-grade narrowing of the left lateral recess without central spinal canal   stenosis. No right neural foraminal stenosis. Partial effacement of the inferior left neural foramen with minimal left neural foraminal stenosis.     L2-L3: Moderate loss of disc height and signal. Circumferential disc bulge with shallow right foraminal disc protrusion and asymmetric left foraminal and lateral disc osteophyte complex. Mild to moderate facet arthropathy. Mild left and minimal right   lateral recess stenosis without central spinal canal stenosis. Partial effacement inferior neural foramina. No right neural foraminal stenosis. Mild left neural foraminal stenosis.     L3-L4: Mild to moderate loss of disc and in signal. Circumferential disc bulge and mild interbody spurring. Moderate to advanced right and moderate left facet arthropathy. Mild ligamentum flavum  thickening. Right greater than left lateral recess stenosis  with mild central spinal canal stenosis. Moderate right neural foraminal stenosis. Mild left neural foraminal stenosis.     L4-L5: Mild loss of disc height and signal. Mild circumferential disc bulge and minor interbody spurring, greater on the right. Mild facet arthropathy. Minimal narrowing of the right lateral recess. No spinal canal stenosis. Mild right neural foraminal   stenosis. No left neural foraminal stenosis.     L5-S1: Disc desiccation with preserved disc height. No focal disc herniation. Moderate to advanced right and mild to moderate left facet arthropathy. No spinal canal stenosis. Mild right neural foraminal stenosis. No left neural foraminal stenosis.     IMPRESSION:   CONCLUSION:  1. Moderate multilevel lumbar spondylosis with S-shaped thoracolumbar curvature and low-grade subluxations as above.  2. At L3-L4, mild spinal canal stenosis with right greater the left lateral recess stenosis. Moderate right and mild left neural foraminal stenosis.  3. At L2-L3, low-grade narrowing of the left lateral recess and mild left neural foraminal stenosis.  4. Mild neural foraminal narrowing at several additional levels as above.     2) XR HIP RIGHT 2 OR MORE VWS  4/3/2017 9:02 AM     INDICATION: Lower abdominal pain, unspecified  COMPARISON: None.     FINDINGS: Negative pelvis and right hip. No fracture or dislocation. Vascular calcifications. Degenerative changes lower lumbar spine.       TREATMENT TO DATE:   Patient has not had any chiropractic, physical therapy or injection for his pain.    Objective:   Constitutional: slim built. Not in acute distress.  Psychiatric:  Judgment and insight are intact.  Alert and oriented.  Mood and affect are appropriate.  HEAD: NC/AT  Muscloskeletal  Gait: ambulatory. normal gait. Able to heel and toe walk without difficulties.   Motor Volition:able to go from a sitting to standing position and sitting on the table  without difficulities.  Lumbar Spine: Foot evertors 5/5, Foot invertors  5/5, Foot dorsiflexor  5/5, Foot plantarflexors  5/5, Leg extensors  5/5, Leg flexors  5/5, Hip abductor  5/5, Hip adductor  5/5. Straight leg Raise is negative. Palpatory tenderness of bilateral L5-S1 paraspinal.  Hip: Tobin Newton Test is positive on the right groin. Yeoman Test is negative. Nachlas' Test negative.Hip ROM is full without pain.  Palpatory tenderness of the right iliotibial band no tenderness of posterior thigh or calf.  NEUROLOGIC:  Bakinski test is negative. Deep tendon reflexes of Patella 2/4 and Achilles  2/4 and symmetrical.  L3-S1 dermatomes are intact. Muscle tone is normal. Clonus is negative.  LYMPHATIC: No groin adenopathy.   SKIN:  No lesion of the epidermis or subcutaneous tissue of the lumbar spine.  PULMONARY:  Effort is normal.  ABDOMEN: flat habitus.Soft and nontender in all four quadrants. No palpable masses felt.

## 2021-06-09 NOTE — TELEPHONE ENCOUNTER
Refill Approved    Rx renewed per Medication Renewal Policy. Medication was last renewed on 5/27/20.    Rowena Ng, [facility] Triage/Med Refill 7/18/2020     Requested Prescriptions   Pending Prescriptions Disp Refills     omeprazole (PRILOSEC) 20 MG capsule [Pharmacy Med Name: OMEPRAZOLE 20MG CAPSULES] 90 capsule 0     Sig: TAKE 1 CAPSULE BY MOUTH DAILY       GI Medications Refill Protocol Passed - 7/17/2020  6:21 PM        Passed - PCP or prescribing provider visit in last 12 or next 3 months.     Last office visit with prescriber/PCP: 2/12/2018 Carter Mai MD OR same dept: 8/22/2019 Justen Monahan MD OR same specialty: 8/22/2019 Justen Monahan MD  Last physical: 10/14/2019 Last MTM visit: Visit date not found   Next visit within 3 mo: Visit date not found  Next physical within 3 mo: Visit date not found  Prescriber OR PCP: Carter Mai MD  Last diagnosis associated with med order: 1. Esophagitis  - omeprazole (PRILOSEC) 20 MG capsule [Pharmacy Med Name: OMEPRAZOLE 20MG CAPSULES]; TAKE 1 CAPSULE BY MOUTH DAILY  Dispense: 90 capsule; Refill: 0    If protocol passes may refill for 12 months if within 3 months of last provider visit (or a total of 15 months).

## 2021-06-09 NOTE — PROGRESS NOTES
Assessment/ Plan  1. Malaise and fatigue  Uncertain etiology,  - Comprehensive Metabolic Panel  - HM2(CBC w/o Differential)  - Urinalysis-UC if Indicated    2. Itching  Antihistamine, continue, check for secondary causes  - Comprehensive Metabolic Panel  - HM2(CBC w/o Differential)  - Urinalysis-UC if Indicated    3. Balanitis  Nystatin ointment    4. Insomnia  Extensive discussion regarding options.  Been on benzodiazepines and other sleep aids in the past, none are effective.  Discussed option of cognitive behavioral therapy and referral made  - Ambulatory referral to Psychology    Body mass index is 26.49 kg/(m^2).    Subjective  CC:  Chief Complaint   Patient presents with     Itchy Skin     a week     Nausea     Lack of energy     Hip pain     right hip pain, on and off     Medication Refill     Lorazepam, patient want a 90 day supply if possible     HPI:  Something on penis      Pruritis- generalized    Lethargic    Nauseated    Dizzy    1 week ago  Started with itchy skin  Putting cream on      Nausea  Equilibrium problems    Dizziness      Vertigo? yes*  Trigger? none  Associated symptoms? fatigue  Almost feels like sinus pressure    No vomiting    January - 2 bouts of the flu  No chills- normal to slightly  R hip pain    Achy-     Decreased appetite  No diarrhea    Not changed      Urinary leakage  Rash     PFSH:  Current medications reviewed as follows:  Current Outpatient Prescriptions on File Prior to Visit   Medication Sig     atorvastatin (LIPITOR) 40 MG tablet TAKE 1 TABLET BY MOUTH AT BEDTIME     loratadine 5 mg TbDL Take 10 mg by mouth daily as needed.      multivitamin (MEN'S MULTI-VITAMIN) per tablet Take 1 tablet by mouth daily.     niacin (NIASPAN) 750 MG CR tablet TAKE 1 TABLET BY MOUTH AT BEDTIME     tadalafil (CIALIS) 5 MG tablet Take 5 mg by mouth daily as needed for erectile dysfunction.     XARELTO 20 mg Tab TAKE 1 TABLET BY MOUTH AFTER DINNER     [DISCONTINUED] LORazepam (ATIVAN) 1 MG  tablet TAKE 1 TABLET BY MOUTH AT BEDTIME     No current facility-administered medications on file prior to visit.      Patient Active Problem List   Diagnosis     Urinary Frequency More Than Twice At Night (Nocturia)     Carcinoma Of The Prostate Gland     Hyperlipidemia     Hypertension     Leg Localized Swelling Right     Anxiety     Ringing In The Ears (Tinnitus)     Hearing Loss     Tendonitis     Peripheral Neuropathy     Pulmonary embolus     DVT (deep venous thrombosis)     Factor V Leiden mutation     Prostate cancer     Polyp of colon, adenomatous     History   Smoking Status     Former Smoker   Smokeless Tobacco     Not on file     Social History     Social History Narrative     Patient Care Team:  Carter Mai MD as PCP - General  Angel Luis Rivas MD as Physician (Hematology and Oncology)  ROS  Borderline constitutional with maybe a low-grade fever may be body aches but nothing definite, negative ENT, negative respiratory, negative GI, negative urologic except as noted    Objective  Physical Exam  Vitals:    03/09/17 0842   BP: 150/70   Patient Site: Left Arm   Patient Position: Sitting   Cuff Size: Adult Regular   Pulse: 60   Resp: 16   Temp: 96.8  F (36  C)   TempSrc: Oral   Weight: 182 lb (82.6 kg)     Head- normocephalic atraumatic.  Normal conjunctiva, pupils equal.  No lid abnormalities apparent.  Normal auricles.  Ear canals appear normal.  TMs well visualized and both are normal.  Oral cavity without abnormality, no ulcers.  Acceptable dentition.  Pharynx is normal in appearance without erythema or exudate.  Neck examined and is normal without lymphadenopathy.  Gen- alert, oriented/ appropriately responsive  HEENT- normal cephalic, atraumatic.   Chest- Normal inspiration and expiration.  Clear to ascultation.  No chest wall deformity or scar.  CV- Heart regular rate and rhythm, normal tones, no murmurs gallops or rubs.  Ext- appear well perfused, no edema  Skin- warm and dry, no visualized  rash    Diagnostics  Results for orders placed or performed in visit on 03/09/17   Comprehensive Metabolic Panel   Result Value Ref Range    Sodium 142 136 - 145 mmol/L    Potassium 4.4 3.5 - 5.0 mmol/L    Chloride 107 98 - 107 mmol/L    CO2 28 22 - 31 mmol/L    Anion Gap, Calculation 7 5 - 18 mmol/L    Glucose 79 70 - 125 mg/dL    BUN 21 8 - 28 mg/dL    Creatinine 0.93 0.70 - 1.30 mg/dL    GFR MDRD Af Amer >60 >60 mL/min/1.73m2    GFR MDRD Non Af Amer >60 >60 mL/min/1.73m2    Bilirubin, Total 0.9 0.0 - 1.0 mg/dL    Calcium 9.3 8.5 - 10.5 mg/dL    Protein, Total 7.0 6.0 - 8.0 g/dL    Albumin 4.0 3.5 - 5.0 g/dL    Alkaline Phosphatase 61 45 - 120 U/L    AST 21 0 - 40 U/L    ALT 25 0 - 45 U/L   HM2(CBC w/o Differential)   Result Value Ref Range    WBC 5.8 4.0 - 11.0 thou/uL    RBC 4.96 4.40 - 6.20 mill/uL    Hemoglobin 15.6 14.0 - 18.0 g/dL    Hematocrit 47.3 40.0 - 54.0 %    MCV 95 80 - 100 fL    MCH 31.5 27.0 - 34.0 pg    MCHC 33.0 32.0 - 36.0 g/dL    RDW 12.8 11.0 - 14.5 %    Platelets 145 140 - 440 thou/uL    MPV 7.4 7.0 - 10.0 fL   Urinalysis-UC if Indicated   Result Value Ref Range    Color, UA Yellow Colorless, Yellow, Straw, Light Yellow    Clarity, UA Clear Clear    Glucose, UA Negative Negative    Bilirubin, UA Negative Negative    Ketones, UA Negative Negative    Specific Gravity, UA <=1.005 1.005 - 1.030    Blood, UA Trace (!) Negative    pH, UA 6.0 5.0 - 8.0    Protein, UA Negative Negative mg/dL    Urobilinogen, UA 0.2 E.U./dL 0.2 E.U./dL, 1.0 E.U./dL    Nitrite, UA Negative Negative    Leukocytes, UA Negative Negative    Bacteria, UA Few (!) None Seen hpf    RBC, UA 0-2 None Seen, 0-2 hpf    WBC, UA 0-5 None Seen, 0-5 hpf    Squam Epithel, UA 0-5 None Seen, 0-5 lpf         Please note: Voice recognition software was used in this dictation.  It may therefore contain typographical errors.

## 2021-06-11 NOTE — TELEPHONE ENCOUNTER
Also let the patient know that unfortunately we cannot do an annual wellness visit and a preop at the same jo.  E the insurance will pay for it

## 2021-06-11 NOTE — TELEPHONE ENCOUNTER
Informed the pt that he can do the AWV with Dr. Mai but it will have to be a video visit otherwise he can see on of Dr. Mai's partners in clinic. Per pt will think about it and call us back.

## 2021-06-11 NOTE — TELEPHONE ENCOUNTER
New Appointment Needed  What is the reason for the visit:    Annual Wellness Visit    - Angel Luis would like his annual physical, however unable to to schedule showing Dr. Mai is only doing virtual appointment and the patient would like to come in to the clinic. Does he need to schedule with a different doctor? Please advise   Provider Preference: PCP only  How soon do you need to be seen?: After 10/14/2020  Waitlist offered?: No  Okay to leave a detailed message:  Yes

## 2021-06-11 NOTE — PROGRESS NOTES
"Chief Complaint   Patient presents with     Shoulder Pain     per pt reached for a glass of water felt like he pull a muscle or ligament     Medication Refill     lorazepam     Subjective:  76 y.o. male with concerns of right shoulder pain.  Worse for a few days.  Has not had singificant weakness.  No numbness or tingling in arms.  Worse with some movements.  Taking acetaminophen with some relief.    Also requests refill of BZD for sleep.  Chart lists anxiety as well.    Current Outpatient Prescriptions   Medication Sig     acetaminophen (TYLENOL) 500 MG tablet Take 500 mg by mouth every 6 (six) hours as needed for pain.     atorvastatin (LIPITOR) 40 MG tablet TAKE 1 TABLET BY MOUTH AT BEDTIME     loratadine 5 mg TbDL Take 10 mg by mouth daily as needed.      LORazepam (ATIVAN) 0.5 MG tablet Take 2 pills at night prn     multivitamin (MEN'S MULTI-VITAMIN) per tablet Take 1 tablet by mouth daily.     niacin (NIASPAN) 750 MG CR tablet TAKE 1 TABLET BY MOUTH AT BEDTIME     nystatin (MYCOSTATIN) ointment Apply to rash bid     rivaroxaban (XARELTO) 20 mg Tab Take 1 tablet (20 mg total) by mouth daily with supper.     tadalafil (CIALIS) 5 MG tablet Take 5 mg by mouth daily as needed for erectile dysfunction.     methocarbamol (ROBAXIN) 500 MG tablet Take 1 tablet (500 mg total) by mouth at bedtime.     methylPREDNISolone (MEDROL DOSEPACK) 4 mg tablet follow package directions      History   Smoking Status     Former Smoker   Smokeless Tobacco     Not on file      Objective:  /70 (Patient Site: Right Arm, Patient Position: Sitting, Cuff Size: Adult Large)  Pulse 64  Temp 96.7  F (35.9  C) (Oral)   Resp 16  Ht 5' 9.5\" (1.765 m)  Wt 183 lb (83 kg)  BMI 26.64 kg/m2    Right Shoulder Exam:  Tenderness to palpation at acromion  Arc: minimally painful  Hawkin's: negative  Neer's: positive  Push off: negative  Empty can: positive  Cross arm impingement: negative  Sulcus: negative  Apprehension:  negative    Distal " capillary refill, pulses, and motor and sensory function intact and normal.    Xray personally reviewed.  Mild degenerative changes.    Assessment and Plan:     1. Acute pain of right shoulder  Discussed some PT exercises that he could do.  Anticoagulated for FV Leiden so not good candidate for NSAIDs.  He will do these and continue with acetaminophen.  Follow up or call in 1-2 weeks if he wants referral for formal PT.    2. Insomnia  Prescribed once previously from our clinic on 3/19 0.5 mg #120.  [place]  reviewed.  Three previous rx of 1 mg #60 on 9/7, 8/3, and 7/7--all 2016 from a Dr. Tobin Thibodeaux in [place].  I can refill for now. Would suggest follow up with Dr. Thibodeaux or PCP for further refills.  - LORazepam (ATIVAN) 0.5 MG tablet; Take 2 pills at night prn  Dispense: 60 tablet; Refill: 0    3. Anxiety  Likely contributes to above.

## 2021-06-12 NOTE — PROGRESS NOTES
Assessment & Plan:  1. Essential hypertension with goal blood pressure less than 130/85       Continue current regimen for now, come in for next check in 2 weeks.  If elevated at that time would consider adjustment of medication.  Patient was expecting his blood pressure needs to stay within 120/80, discussed that due to the age we can let him sit a bit higher.  Also recommended he bring in his cuff for calibration at the time of his next visit.  There are no Patient Instructions on file for this visit.    No orders of the defined types were placed in this encounter.    There are no discontinued medications.        Chief Complaint:   Chief Complaint   Patient presents with     Hypertension       History of Present Illness:  Angel Luis is a 77 y.o. male presenting to the clinic today for elevated blood pressure.  He has been checking blood pressures at home after being started on medication in a few weeks ago.  Blood pressures have been back in 4.  Ranging from 1 teens over 60s up to 150s over 80s.  The high blood pressures are quite sparing according to the list he brought with him today.  He is noticing some symptoms of flushing when he feels blood pressure is high.  He has been on losartan for a total of about 4 weeks.  The majority of blood pressures look to be within range, however he was told by his physician his goal should be 120/80.  Patient is wondering if medication adjustments should be done at this time.  He has been taking blood pressures with both her wrist cuff and an arm cuff, both of which are greater than 10 years old.  They have not been calibrated as far as he knows.     Review of Systems:  All other systems are negative except as noted above.    PFSH:  Reviewed and updated.    Tobacco Use:  History   Smoking Status     Former Smoker   Smokeless Tobacco     Never Used       Vitals:  Vitals:    09/08/17 0931   BP: 142/70   Patient Site: Right Arm   Patient Position: Sitting   Cuff Size: Adult Regular    Pulse: (!) 54   Resp: 16   Weight: 179 lb (81.2 kg)     Wt Readings from Last 3 Encounters:   09/08/17 179 lb (81.2 kg)   08/17/17 180 lb (81.6 kg)   06/29/17 183 lb (83 kg)       Physical Exam:  Constitutional:  Reveals an alert, cooperative, 77-year-old male in no acute distress.  Vitals:  Per nursing notes.  Cardiovascular:  Regular rate and rhythm without murmurs, rubs, or gallops. Carotids without bruits. Legs without edema.   Respiratory: Clear.  Respiratory effort normal.  Psychiatric:  Mood appropriate, memory intact.     Data Reviewed:  Additional History from Old Records or Another Person Summarized (2 total): None.     Decision to Obtain Extra information (1 total): None.     Radiology Tests Summarized and Ordered (XRAY/CT/MRI/DXA) (1 total): None.    Labs Reviewed and Ordered (1 total): None.    Medicine Tests Summarized and Ordered (EKG/ECHO/COLONOSCOPY/EGD) (1 total): None.    Independent Review of EKG or X-Ray (2 each): None.    The visit lasted a total of 20 minutes face to face with the patient. Over 50% of the time was spent counseling and educating the patient about plan of care.    Medications:  Current Outpatient Prescriptions   Medication Sig Dispense Refill     acetaminophen (TYLENOL) 500 MG tablet Take 500 mg by mouth every 6 (six) hours as needed for pain.       atorvastatin (LIPITOR) 40 MG tablet TAKE 1 TABLET BY MOUTH AT BEDTIME 90 tablet 2     loratadine 5 mg TbDL Take 10 mg by mouth daily as needed.        LORazepam (ATIVAN) 1 MG tablet 1 po qhs 90 tablet 0     losartan (COZAAR) 25 MG tablet Take 1 tablet (25 mg total) by mouth daily. 30 tablet 3     multivitamin (MEN'S MULTI-VITAMIN) per tablet Take 1 tablet by mouth daily.       niacin (NIASPAN) 750 MG CR tablet TAKE 1 TABLET BY MOUTH AT BEDTIME 30 tablet 6     tadalafil (CIALIS) 5 MG tablet Take 5 mg by mouth daily as needed for erectile dysfunction.       XARELTO 20 mg Tab TAKE 1 TABLET(20 MG) BY MOUTH DAILY WITH SUPPER 90 tablet 0      No current facility-administered medications for this visit.        Total Data Points: 0    LUIS MIGUEL Wasserman, CNP    This note has been dictated using voice recognition software. Any grammatical or context distortions are unintentional and inherent to the software

## 2021-06-12 NOTE — PROGRESS NOTES
Subjective: This patient comes in for discussion regarding hypertension as well as insomnia.    Patient remains on Xarelto he has factor V Leyden mutation and a history of pulmonary embolus.    Patient has some elevated blood pressure he has been keeping track of this.  Often the readings are in the 140-150 over 80s and low 90s.    I can start him on losartan 25 mg 1 a day in the past he has had some amlodipine.    Patient's pulse at home is often in the 50s-60s.    He will follow-up for physical early October we will check renal function then    Second issue is sleep he has been evaluated at the sleep study saw Dr. Tobin devries who refilled some lorazepam.  Patient's been on lorazepam for about 4 years.  He has tried over-the-counter medicines, Ambien, trazodone, Rozerem, and Zaleplone.    I discussed options he has not change the dose he still on 1 mg daily.    I had not filled out a controlled substance treatment agreement and will continue on lorazepam 1 at bedtime 1 mg tablet.  I gave him 90 tablets.    Tobacco status: He  reports that he has quit smoking. He has never used smokeless tobacco.    Patient Active Problem List    Diagnosis Date Noted     Polyp of colon, adenomatous 10/22/2016     Factor V Leiden mutation 06/28/2016     Prostate cancer 06/28/2016     Pulmonary embolus 02/17/2015     DVT (deep venous thrombosis) 02/17/2015     Peripheral Neuropathy      Urinary Frequency More Than Twice At Night (Nocturia)      Carcinoma Of The Prostate Gland      Hyperlipidemia      Hypertension      Leg Localized Swelling Right      Anxiety      Ringing In The Ears (Tinnitus)      Hearing Loss      Tendonitis        Current Outpatient Prescriptions   Medication Sig Dispense Refill     acetaminophen (TYLENOL) 500 MG tablet Take 500 mg by mouth every 6 (six) hours as needed for pain.       atorvastatin (LIPITOR) 40 MG tablet TAKE 1 TABLET BY MOUTH AT BEDTIME 90 tablet 2     loratadine 5 mg TbDL Take 10 mg by mouth  "daily as needed.        multivitamin (MEN'S MULTI-VITAMIN) per tablet Take 1 tablet by mouth daily.       niacin (NIASPAN) 750 MG CR tablet TAKE 1 TABLET BY MOUTH AT BEDTIME 30 tablet 6     tadalafil (CIALIS) 5 MG tablet Take 5 mg by mouth daily as needed for erectile dysfunction.       XARELTO 20 mg Tab TAKE 1 TABLET(20 MG) BY MOUTH DAILY WITH SUPPER 90 tablet 0     LORazepam (ATIVAN) 1 MG tablet 1 po qhs 90 tablet 0     losartan (COZAAR) 25 MG tablet Take 1 tablet (25 mg total) by mouth daily. 30 tablet 3     No current facility-administered medications for this visit.        ROS:   Review of systems negative other than as outlined above    Objective:    /68 (Patient Site: Left Arm, Patient Position: Sitting, Cuff Size: Adult Large)  Pulse 68  Resp 16  Wt 180 lb (81.6 kg)  BMI 26.2 kg/m2  Body mass index is 26.2 kg/(m^2).      General appearance no acute distress    Vital signs were stable here blood pressures reviewed at home elevated    Neck negative oropharynx clear    Lungs clear no rales rhonchi heart regular S1-S2 rate here at 60-65 at home in the 50-60s    Extremities with trace edema on the right none on the left.    His lower back is doing much better now he did [facility] for the low back pain and does do the \"Ángel chair \"exercises at home.  He is playing golf and pickleball        Assessment:  1. Hypertension  losartan (COZAAR) 25 MG tablet   2. Insomnia  LORazepam (ATIVAN) 1 MG tablet     Hypertension restart medication will treat with losartan 25 mg 1 a day call if not improved    Insomnia continue on lorazepam please see above discussion, controlled substance treatment agreement signed    Plan: Follow-up for physical in October    This transcription uses voice recognition software, which may contain typographical errors.  "

## 2021-06-12 NOTE — PROGRESS NOTES
Assessment and Plan:   Patient has been advised of split billing requirements and indicates understanding: No  1. Healthcare maintenance  Patient has had flu shot, given tetanus shot    2. Dry mouth  Prescribed pilocarpine by ENT specialist/consultant, will assume prescription  - pilocarpine (SALAGEN) 5 MG tablet; Take 1 tablet (5 mg total) by mouth 4 (four) times a day.  Dispense: 260 tablet; Refill: 0    3. Bilateral sciatica  Patient requesting Medrol Dosepak which is effective in case he has sciatic problems, to have on hand  - methylPREDNISolone (MEDROL DOSEPACK) 4 mg tablet; AS DIRECTED  Dispense: 21 tablet; Refill: 2    4. Neuropathic pain of foot, unspecified laterality  Fairly mild problems, mostly at night.  Diminished lower extremity reflexes, symmetric.  Check common labs, discussed treatment, does not warrant nerve stabilizer at this time  - Erythrocyte Sedimentation Rate  - Hepatitis C Antibody (Anti-HCV)  - HM2(CBC w/o Differential)  - Thyroid Stimulating Hormone (TSH)  - Vitamin B12    5. Heart murmur  Holosystolic type murmur versus soft ejection murmur  - Echo Complete; Future    6. Hyperlipidemia, unspecified hyperlipidemia type  Atorvastatin 40, check labs  - Lipid Cascade  - ALT (SGPT)    7. Carcinoma Of The Prostate Gland  Follows with urology, undetectable PSA    8. Adenomatous polyp of colon, unspecified part of colon  Due for follow-up colonoscopy next year    9. Chronic deep vein thrombosis (DVT) of proximal vein of right lower extremity (H)  Xarelto, indefinite, factor V Leiden    10. Hypertension  Well-controlled, amlodipine 5 and losartan  - Basic Metabolic Panel      The patient's current medical problems were reviewed.    I have had an Advance Directives discussion with the patient.  The following health maintenance schedule was reviewed with the patient and provided in printed form in the after visit summary:   Health Maintenance   Topic Date Due     HEPATITIS C SCREENING   1940     MEDICARE ANNUAL WELLNESS VISIT  10/05/2018     TD 18+ HE  08/13/2020     FALL RISK ASSESSMENT  05/26/2021     ADVANCE CARE PLANNING  10/04/2021     LIPID  10/14/2024     Pneumococcal Vaccine: Pediatrics (0 to 5 Years) and At-Risk Patients (6 to 64 Years)  Completed     Pneumococcal Vaccine: 65+ Years  Completed     INFLUENZA VACCINE RULE BASED  Completed     ZOSTER VACCINES  Completed     HEPATITIS B VACCINES  Aged Out        Subjective:   Chief Complaint: Angel Luis Boykin is an 80 y.o. male here for an Annual Wellness visit.   HPI: 80-year-old, primary care physician is Dr. Mai.  History of hypertension for which he takes amlodipine 5 and losartan 100  History of hyperlipidemia for which he takes atorvastatin 40 mg  History of pulmonary embolism and DVT for which he takes Xarelto.  History of colon polyp, prostate cancer, erectile dysfunction and is on Viagra, peripheral neuropathy and anxiety.    He is due fora tetanus shot.  He is scheduled to have cataract surgery with Dr. Staton on 12/1 and 12/15.  Review of Systems:    Please see above.  The rest of the review of systems are negative for all systems.    Patient Care Team:  Carter Mai MD as PCP - General  Angel Luis Rivas MD as Physician (Hematology and Oncology)  Carter Mai MD as Assigned PCP     Patient Active Problem List   Diagnosis     Urinary Frequency More Than Twice At Night (Nocturia)     Carcinoma Of The Prostate Gland     Hyperlipidemia     Hypertension     Leg Localized Swelling Right     Anxiety     Ringing In The Ears (Tinnitus)     Hearing Loss     Tendonitis     Peripheral Neuropathy     Pulmonary embolus (H)     DVT (deep venous thrombosis) (H)     Factor V Leiden mutation (H)     Prostate cancer (H)     Polyp of colon, adenomatous     Past Medical History:   Diagnosis Date     Anxiety     Created by Conversion      Carcinoma Of The Prostate Gland     Created by Conversion      Hearing Loss     Created by Conversion       Hyperlipidemia     Created by Conversion      Hypertension     Created by Conversion      Leg Localized Swelling Right     Created by Conversion      PE (pulmonary thromboembolism) (H)     Blood clot in lungs     Peripheral Neuropathy     Created by Conversion      Ringing In The Ears (Tinnitus)     Created by Conversion      Tendonitis     Created by Conversion [facility] Annotation: Sep 19 2008 11:28Bonny Noland: Biceps      Urinary Frequency More Than Twice At Night (Nocturia)     Created by Conversion       Past Surgical History:   Procedure Laterality Date     PROSTATECTOMY        Family History   Problem Relation Age of Onset     Stroke Father         Acute Myocardial Infarction     Heart disease Father      CABG Mother      Heart disease Mother      Heart disease Sister         all four sisters     Heart disease Sister      Heart disease Sister      Heart disease Sister       Social History     Socioeconomic History     Marital status:      Spouse name: Not on file     Number of children: Not on file     Years of education: Not on file     Highest education level: Not on file   Occupational History     Not on file   Social Needs     Financial resource strain: Not on file     Food insecurity     Worry: Not on file     Inability: Not on file     Transportation needs     Medical: Not on file     Non-medical: Not on file   Tobacco Use     Smoking status: Former Smoker     Smokeless tobacco: Never Used   Substance and Sexual Activity     Alcohol use: Yes     Alcohol/week: 2.0 standard drinks     Types: 2 Glasses of wine per week     Comment: 2 glasses wine/day     Drug use: Not on file     Sexual activity: Not on file   Lifestyle     Physical activity     Days per week: Not on file     Minutes per session: Not on file     Stress: Not on file   Relationships     Social connections     Talks on phone: Not on file     Gets together: Not on file     Attends Evangelical service: Not on file      "Active member of club or organization: Not on file     Attends meetings of clubs or organizations: Not on file     Relationship status: Not on file     Intimate partner violence     Fear of current or ex partner: Not on file     Emotionally abused: Not on file     Physically abused: Not on file     Forced sexual activity: Not on file   Other Topics Concern     Not on file   Social History Narrative     Not on file      Current Outpatient Medications   Medication Sig Dispense Refill     acetaminophen (TYLENOL) 500 MG tablet Take 500 mg by mouth every 6 (six) hours as needed for pain.       amLODIPine (NORVASC) 5 MG tablet TAKE 1 TABLET(5 MG) BY MOUTH DAILY 90 tablet 1     atorvastatin (LIPITOR) 40 MG tablet TAKE 1 TABLET BY MOUTH AT BEDTIME 90 tablet 1     hydrOXYzine HCL (ATARAX) 25 MG tablet TAKE 1 TABLET(25 MG) BY MOUTH AT BEDTIME 90 tablet 1     loratadine 5 mg TbDL Take 10 mg by mouth daily as needed.        LORazepam (ATIVAN) 1 MG tablet TAKE 1 TABLET(1 MG) BY MOUTH AT BEDTIME 90 tablet 0     losartan (COZAAR) 100 MG tablet TAKE 1 TABLET(100 MG) BY MOUTH DAILY 90 tablet 1     methylPREDNISolone (MEDROL DOSEPACK) 4 mg tablet AS DIRECTED 21 tablet 2     multivitamin (MEN'S MULTI-VITAMIN) per tablet Take 1 tablet by mouth daily.       omeprazole (PRILOSEC) 20 MG capsule TAKE 1 CAPSULE BY MOUTH DAILY 90 capsule 0     XARELTO 20 mg tablet TAKE 1 TABLET(20 MG) BY MOUTH DAILY WITH SUPPER 90 tablet 1     pilocarpine (SALAGEN) 5 MG tablet Take 1 tablet (5 mg total) by mouth 4 (four) times a day. 260 tablet 0     sildenafil (REVATIO) 20 mg tablet 1-5 po 1 hour prior to sex as needed 100 tablet 1     No current facility-administered medications for this visit.       Objective:   Vital Signs:   Visit Vitals  /72 (Patient Site: Right Arm, Patient Position: Sitting, Cuff Size: Adult Regular)   Pulse (!) 58   Temp 97  F (36.1  C) (Temporal)   Resp 15   Ht 5' 10\" (1.778 m)   Wt 179 lb (81.2 kg)   BMI 25.68 kg/m     "       VisionScreening:  No exam data present     PHYSICAL EXAM  Gen- alert, oriented/ appropriately responsive  HEENT- normal cephalic, atraumatic.   Chest- Normal inspiration and expiration.    Clear to ascultation.    No chest wall deformity or scar.  CV- Heart regular rate and rhythm  normal tones, no murmurs   No gallops or rubs.  Ext- appear well perfused, no edema  Skin- warm and dry,   no visualized rash    Intact sensation on monofilament testing  Assessment Results 10/28/2020   Activities of Daily Living No help needed   Instrumental Activities of Daily Living No help needed   Mini Cog Total Score 5   Some recent data might be hidden     A Mini-Cog score of 0-2 suggests the possibility of dementia, score of 3-5 suggests no dementia      Identified Health Risks:     Patient's advanced directive was discussed and I am comfortable with the patient's wishes.

## 2021-06-12 NOTE — PATIENT INSTRUCTIONS - HE
Advance Directive  Patient s advance directive was discussed and I am comfortable with the patient s wishes.  Patient Education   Personalized Prevention Plan  You are due for the preventive services outlined below.  Your care team is available to assist you in scheduling these services.  If you have already completed any of these items, please share that information with your care team to update in your medical record.  Health Maintenance   Topic Date Due     HEPATITIS C SCREENING  1940     FALL RISK ASSESSMENT  05/26/2021     MEDICARE ANNUAL WELLNESS VISIT  10/28/2021     LIPID  10/14/2024     ADVANCE CARE PLANNING  10/28/2025     TD 18+ HE  10/28/2030     Pneumococcal Vaccine: Pediatrics (0 to 5 Years) and At-Risk Patients (6 to 64 Years)  Completed     Pneumococcal Vaccine: 65+ Years  Completed     INFLUENZA VACCINE RULE BASED  Completed     ZOSTER VACCINES  Completed     HEPATITIS B VACCINES  Aged Out

## 2021-06-13 NOTE — PROGRESS NOTES
"Subjective: This patient comes in for physical.  He is on losartan for blood pressure Lipitor and niacin for hyperlipidemia.    I can have him stop the niacin.  He is triglycerides are slightly up today at 210 LDL was 106    Also has had a DVT on the right with PE he has factor V Leyden mutation.  He continues on Xarelto that has been stable.    Patient had a colonoscopy in 2016 with a polyp.  Is due again in October 2021.    He has had prostate cancer he [facility] he gets [facility]As there.    Patient's had low back pain he [facility] and has been doing ongoing therapy, uses \"Ángel chair \"device at home.    He has had some pain now with his shoulder he had x-rays previously earlier this summer and had some mild degenerative change at the AC joint.  He has some ongoing pain through the anterior glenohumeral joint.    I discussed options and will have him see the orthopedic surgeon question may go on for injection or possible MRI.    Additionally he has had some neck pain it is more on the right than the left he does get a little radiation down toward the shoulder but no distant radicular changes.    I discussed options and elected to get an x-ray here please see below regarding some degenerative changes in through the cervical spine with some disc space narrowing some straightening, some facet arthropathy.    Tobacco status: He  reports that he has quit smoking. He has never used smokeless tobacco.    Patient Active Problem List    Diagnosis Date Noted     Polyp of colon, adenomatous 10/22/2016     Factor V Leiden mutation 06/28/2016     Prostate cancer 06/28/2016     Pulmonary embolus 02/17/2015     DVT (deep venous thrombosis) 02/17/2015     Peripheral Neuropathy      Urinary Frequency More Than Twice At Night (Nocturia)      Carcinoma Of The Prostate Gland      Hyperlipidemia      Hypertension      Leg Localized Swelling Right      Anxiety      Ringing In The Ears " "(Tinnitus)      Hearing Loss      Tendonitis        Current Outpatient Prescriptions   Medication Sig Dispense Refill     acetaminophen (TYLENOL) 500 MG tablet Take 500 mg by mouth every 6 (six) hours as needed for pain.       atorvastatin (LIPITOR) 40 MG tablet TAKE 1 TABLET BY MOUTH AT BEDTIME 90 tablet 0     loratadine 5 mg TbDL Take 10 mg by mouth daily as needed.        LORazepam (ATIVAN) 1 MG tablet 1 po qhs 90 tablet 0     losartan (COZAAR) 25 MG tablet Take 1 tablet (25 mg total) by mouth daily. 30 tablet 3     multivitamin (MEN'S MULTI-VITAMIN) per tablet Take 1 tablet by mouth daily.       tadalafil (CIALIS) 5 MG tablet Take 5 mg by mouth daily as needed for erectile dysfunction.       XARELTO 20 mg Tab TAKE 1 TABLET(20 MG) BY MOUTH DAILY WITH SUPPER 90 tablet 1     No current facility-administered medications for this visit.        ROS: 10 point review of systems negative other than as outlined above    Objective:    /70 (Patient Site: Right Arm, Patient Position: Sitting, Cuff Size: Adult Large)  Pulse 64  Temp 96.8  F (36  C) (Oral)   Resp 20  Ht 5' 10\" (1.778 m) Comment: with shoes  Wt 179 lb (81.2 kg)  BMI 25.68 kg/m2  Body mass index is 25.68 kg/(m^2).      General appearance no acute distress    His vital signs were stable weight is stable.    HEENT oropharynx was clear pupils react normally canals and TMs normal    Neck was supple he does have some paraspinal tenderness some radiation down towards the right shoulder.  Shoulder issue a separate though he has pain through the anterior glenohumeral joint does not have much over the AC joint.  He is active range of motion was fairly good but has discomfort with movements above his head.  He has noticed this when he plays \"pickle ball \"    His lungs are clear no rales or rhonchi, heart regular S1-S2 rate in the 60s.    Abdomen soft nontender no masses no axillary inguinal adenopathy    Rectal was done he has had a previous prostatectomy no " signs of recurrence.    Genitalia normal descended testes no evidence of hernia.    Lower extremities he has a little stiffness in the paraspinal muscles in the lower back but no radicular findings in his legs.    His extremities without edema skin was normal no rashes    Peripheral pulses full.    Lab work showed the platelets at 133,000, this first time this was down a little.  Triglycerides 210     CMP was normal rest of the CBC normal.    Cervical spine film was reviewed and has some straightening some disc space narrowing at 3 levels and facet arthropathy was moderate.    Results for orders placed or performed in visit on 10/05/17   Comprehensive Metabolic Panel   Result Value Ref Range    Sodium 143 136 - 145 mmol/L    Potassium 3.9 3.5 - 5.0 mmol/L    Chloride 106 98 - 107 mmol/L    CO2 27 22 - 31 mmol/L    Anion Gap, Calculation 10 5 - 18 mmol/L    Glucose 92 70 - 125 mg/dL    BUN 16 8 - 28 mg/dL    Creatinine 1.02 0.70 - 1.30 mg/dL    GFR MDRD Af Amer >60 >60 mL/min/1.73m2    GFR MDRD Non Af Amer >60 >60 mL/min/1.73m2    Bilirubin, Total 0.7 0.0 - 1.0 mg/dL    Calcium 9.2 8.5 - 10.5 mg/dL    Protein, Total 6.6 6.0 - 8.0 g/dL    Albumin 3.5 3.5 - 5.0 g/dL    Alkaline Phosphatase 63 45 - 120 U/L    AST 22 0 - 40 U/L    ALT 20 0 - 45 U/L   Lipid Cascade FASTING   Result Value Ref Range    Cholesterol 205 (H) <=199 mg/dL    Triglycerides 210 (H) <=149 mg/dL    HDL Cholesterol 57 >=40 mg/dL    LDL Calculated 106 <=129 mg/dL    Patient Fasting > 8hrs? Yes    HM2(CBC w/o Differential)   Result Value Ref Range    WBC 4.3 4.0 - 11.0 thou/uL    RBC 4.76 4.40 - 6.20 mill/uL    Hemoglobin 15.1 14.0 - 18.0 g/dL    Hematocrit 47.5 40.0 - 54.0 %     80 - 100 fL    MCH 31.6 27.0 - 34.0 pg    MCHC 31.7 (L) 32.0 - 36.0 g/dL    RDW 11.6 11.0 - 14.5 %    Platelets 133 (L) 140 - 440 thou/uL    MPV 7.4 7.0 - 10.0 fL       Assessment:  1. Routine general medical examination at a health care facility  Comprehensive  Metabolic Panel   2. Hypertension  Comprehensive Metabolic Panel   3. Factor V Leiden mutation  HM2(CBC w/o Differential)   4. Hyperlipidemia  Lipid Keith FASTING   5. Pulmonary embolus  HM2(CBC w/o Differential)   6. Carcinoma Of The Prostate Gland     7. Cervical pain (neck)  XR Cervical Spine 2 - 3 VWS   8. Shoulder pain, right  Ambulatory referral to Orthopedic Surgery     Physical fairly stable with the exception of below.    Hypertension controlled    Hyperlipidemia controlled just mild hypertriglyceridemia will have him stop niacin continue on Lipitor.    History of DVT and PE with factor V Leyden mutation continue Xarelto    Prostate cancer follow-up with [facility].    Right shoulder pain will see orthopedic surgery question injection.    Cervical neck pain with some moderate degenerative changes consider oral Medrol consider physical therapy, consider MRI should he develop more radicular findings or persisting pain    Plan: As outlined above    This transcription uses voice recognition software, which may contain typographical errors.    I contacted the patient regarding the results.  He is getting an MRI of the shoulder today and will see the orthopedist in follow-up.  We will see how he does regarding that and see if there is any impact on his neck.    Please see above regarding the cervical spine issues.  Again consider MRI consider course on Medrol pack consider therapy.    Patient will contact me after he is through with orthopedist

## 2021-06-13 NOTE — TELEPHONE ENCOUNTER
Dr. Hatfield, Are you willing to make this edit for Dr. Monahan as this patient is scheduled to have surgery tomorrow. Surgery center requesting this edit addendum:   The surgery that is scheduled for 12/1/20 is for the patient's left eye and the patient is also schedule to have the right eye surgery done on 12/15/20. If ok with doing this, Pre-Op Physical will need to be faxed to the above number. Thank you

## 2021-06-13 NOTE — TELEPHONE ENCOUNTER
Return Call  Reason for call:  The surgery center called back inquiring about the status of the request to addend the pre op that was completed on 11/24/20. The surgery that is scheduled for 12/1/20 is for the patient's left eye and the patient is also schedule to have the right eye surgery done on 12/15/20. The surgery center would like this to be addended in the pre op, and please fax this back to [phone], thank you.   Information relayed:  That a message was sent to the provider's care team and there had not been anything else documented as of time of call.  Additional questions:  Yes  If YES, what are your questions/concerns:  When can the surgery center expect to receive this addended pre op, the patient's surgery is tomorrow, 12/1/20.  Okay to leave a detailed message?: Yes

## 2021-06-13 NOTE — TELEPHONE ENCOUNTER
Who is calling:  [facility]  Reason for Call:  Regarding pre-op- on the subjective it says that this surgery is on December 1 for his left eye, and they are doing it on his right eye.  this needs to be addended.    Also the patient is having the second eye done on December 15th and would like to know if it can be  Included on the addendum    Please fax [phone]  Date of last appointment with primary care: n/a  Okay to leave a detailed message: Yes

## 2021-06-13 NOTE — PROGRESS NOTES
[facility] (formerly [facility] PRE-OPERATIVE VISIT FOR:    Angel Luis Boykin,  1940, MRN [id number]    Upcoming surgery date:   Surgeon: Staton  Surgery Facility: [facility]    Chief Complaint: Preop, cataract extraction    PCP: Carter Mai MD, [phone]    SUBJECTIVE:  History of Present Illness:   nAgel Luis Boykin is a 80 y.o. male with history of gradual painless visual loss in both eyes.  Diagnosed with cataracts, will have initial surgery 2020 on left eye.  Addendum. The surgery that is scheduled for 20 is for the patient's left eye and the patient is also schedule to have the right eye surgery done on 12/15/20. Past Medical History:  Patient Active Problem List   Diagnosis     Urinary Frequency More Than Twice At Night (Nocturia)     Carcinoma Of The Prostate Gland     Hyperlipidemia     Hypertension     Leg Localized Swelling Right     Anxiety     Ringing In The Ears (Tinnitus)     Hearing Loss     Tendonitis     Peripheral Neuropathy     Pulmonary embolus (H)     DVT (deep venous thrombosis) (H)     Factor V Leiden mutation (H)     Prostate cancer (H)     Polyp of colon, adenomatous     Past Surgical History:   Procedure Laterality Date     PROSTATECTOMY       TONSILLECTOMY      age 3     Any history of anesthesia reaction no  Do you have difficulty breathing or chest pain after walking up a flight of stairs: No  History of obstructive sleep apnea: No  Steroid use in the last 6 months: No  Frequent Aspirin/NSAID use: Yes: Does not take nonsteroidals but does take Xarelto.  Has been instructed to continue through surgery.  Prior Blood Transfusion: No  Prior Blood Transfusion Reaction: No  If for some reason prior to, during or after the procedure, if it is medically indicated, would you be willing to have a blood transfusion?:  There is no transfusion refusal.    Social History:  he  reports that he has quit smoking. He has never used smokeless tobacco. He reports current alcohol  use of about 2.0 standard drinks of alcohol per week.    Family History:  Family History   Problem Relation Age of Onset     Stroke Father         Acute Myocardial Infarction     Heart disease Father      CABG Mother      Heart disease Mother      Heart disease Sister         all four sisters     Heart disease Sister      Heart disease Sister      Heart disease Sister        Current Medications:  Current Outpatient Medications   Medication Sig Dispense Refill     acetaminophen (TYLENOL) 500 MG tablet Take 500 mg by mouth every 6 (six) hours as needed for pain.       amLODIPine (NORVASC) 5 MG tablet TAKE 1 TABLET(5 MG) BY MOUTH DAILY 90 tablet 1     atorvastatin (LIPITOR) 40 MG tablet TAKE 1 TABLET BY MOUTH AT BEDTIME 90 tablet 1     hydrOXYzine HCL (ATARAX) 25 MG tablet TAKE 1 TABLET(25 MG) BY MOUTH AT BEDTIME 90 tablet 1     loratadine 5 mg TbDL Take 10 mg by mouth daily as needed.        LORazepam (ATIVAN) 1 MG tablet TAKE 1 TABLET(1 MG) BY MOUTH AT BEDTIME 90 tablet 0     losartan (COZAAR) 100 MG tablet TAKE 1 TABLET(100 MG) BY MOUTH DAILY 90 tablet 1     methylPREDNISolone (MEDROL DOSEPACK) 4 mg tablet AS DIRECTED 21 tablet 2     multivitamin (MEN'S MULTI-VITAMIN) per tablet Take 1 tablet by mouth daily.       omeprazole (PRILOSEC) 20 MG capsule TAKE 1 CAPSULE BY MOUTH DAILY 90 capsule 0     pilocarpine (SALAGEN) 5 MG tablet Take 1 tablet (5 mg total) by mouth 4 (four) times a day. 260 tablet 0     XARELTO 20 mg tablet TAKE 1 TABLET(20 MG) BY MOUTH DAILY WITH SUPPER 90 tablet 1     sildenafil (REVATIO) 20 mg tablet 1-5 po 1 hour prior to sex as needed 100 tablet 1     No current facility-administered medications for this visit.        Allergies:  Patient has no known allergies.    Review or Systems:  Constitution: normal  HEENT: normal  Pulmonary: normal  Cardiovascular: normal  GI: normal  : normal  Musculoskeletal: normal  Neuro: normal  Endocrine: normal  Psych: normal  Lymph/Heme:  normal    OBJECTIVE:  Vitals:    11/24/20 1015   BP: 140/70   Pulse: 61   Resp: 16   SpO2: 98%     General Appearance:    Alert, cooperative, no distress, appears stated age   Head:    Normocephalic, without obvious abnormality, atraumatic   Eyes:    PERRL, conjunctiva/corneas clear, EOM's intact   Nose:   Mucosa moist, normal    Throat:   Lips, mucosa, and tongue normal; ora phaynx clear   Neck:   Supple, symmetrical, trachea midline, no adenopathy;     thyroid:  no enlargement/tenderness/nodules; no carotid    bruit or JVD   Lungs:     Clear to auscultation bilaterally, respirations unlabored    Heart:   Soft ejection murmur, regular rate and rhythm.  Recent echocardiogram showing aortic sclerosis without stenosis   Abdomen:     Soft, non-tender, bowel sounds active all four quadrants,     no masses, no organomegaly   Extremities:   Extremities normal, atraumatic, no cyanosis      Edema?  No   Skin:   Skin color, texture, turgor normal, no rashes or lesions   Neurologic:   No focal deficits         Labs:  Patient had normal basic metabolic profile with potassium of 4.3 and normal renal functions on 10/28/2020.  This was not repeated today.  ASSESSMENT/PLAN:  Low risk surgery  No known cardiac disease  Acceptable functional capacity    Acceptable risk for surgery  No special recommendations:  Patient was given approval by surgeons office to continue Xarelto through surgery.  Amlodipine and losartan on morning of surgery.  Justen Monahan MD

## 2021-06-14 ENCOUNTER — COMMUNICATION - HEALTHEAST (OUTPATIENT)
Dept: FAMILY MEDICINE | Facility: CLINIC | Age: 81
End: 2021-06-14

## 2021-06-14 DIAGNOSIS — G47.00 INSOMNIA: ICD-10-CM

## 2021-06-14 RX ORDER — LORAZEPAM 1 MG/1
TABLET ORAL
Qty: 90 TABLET | Refills: 0 | Status: SHIPPED | OUTPATIENT
Start: 2021-06-14 | End: 2021-09-08

## 2021-06-14 NOTE — PROGRESS NOTES
Subjective: This patient comes in for evaluation is a 77-year-old male.  Seen back on 10/5/2017 for rotator cuff problems on the right shoulder as well as neck pain.    X-ray was obtained showing some disc space narrowing at C3-4 C5-6 and C6-7.    Arthropathy noted.    He has a dull headache, mainly in the cervical area he has minimal arm symptoms.    With increased activity seems to worsen.    Patient's had some lower back problems had [facility] and is still doing the home exercises.  That has been relatively stable.    Patient denies any additional problems presently.  He and the orthopedic surgeon elected to hold off on any surgery for the rotator cuff but he is doing therapy    Tobacco status: He  reports that he has quit smoking. He has never used smokeless tobacco.    Patient Active Problem List    Diagnosis Date Noted     Polyp of colon, adenomatous 10/22/2016     Factor V Leiden mutation 06/28/2016     Prostate cancer 06/28/2016     Pulmonary embolus 02/17/2015     DVT (deep venous thrombosis) 02/17/2015     Peripheral Neuropathy      Urinary Frequency More Than Twice At Night (Nocturia)      Carcinoma Of The Prostate Gland      Hyperlipidemia      Hypertension      Leg Localized Swelling Right      Anxiety      Ringing In The Ears (Tinnitus)      Hearing Loss      Tendonitis        Current Outpatient Prescriptions   Medication Sig Dispense Refill     acetaminophen (TYLENOL) 500 MG tablet Take 500 mg by mouth every 6 (six) hours as needed for pain.       atorvastatin (LIPITOR) 40 MG tablet TAKE 1 TABLET BY MOUTH AT BEDTIME 90 tablet 0     loratadine 5 mg TbDL Take 10 mg by mouth daily as needed.        LORazepam (ATIVAN) 1 MG tablet TAKE 1 TABLET BY MOUTH EVERY NIGHT AT BEDTIME 90 tablet 0     losartan (COZAAR) 25 MG tablet TAKE 1 TABLET(25 MG) BY MOUTH DAILY 30 tablet 8     multivitamin (MEN'S MULTI-VITAMIN) per tablet Take 1 tablet by mouth daily.       tadalafil (CIALIS)  "5 MG tablet Take 5 mg by mouth daily as needed for erectile dysfunction.       XARELTO 20 mg Tab TAKE 1 TABLET(20 MG) BY MOUTH DAILY WITH SUPPER 90 tablet 1     No current facility-administered medications for this visit.        ROS: Review of systems negative other than as outlined above    Objective:    /74 (Patient Site: Left Arm, Patient Position: Sitting, Cuff Size: Adult Large)  Pulse (!) 55  Temp 97.1  F (36.2  C) (Oral)   Resp 16  Ht 5' 10\" (1.778 m)  Wt 182 lb (82.6 kg)  SpO2 97% Comment: at rest with room air  BMI 26.11 kg/m2  Body mass index is 26.11 kg/(m^2).      General appearance no acute distress.    Patient has some paraspinal tenderness in the cervical area but it is minimal.  Some limited range of motion with extension.    No radicular findings reflexes normal    Lungs clear no rales or rhonchi, heart regular S1-S2 rate in the 60 range.    Right shoulder consultation from orthopedist reviewed please see above        Assessment:  1. Cervical pain (neck)  Ambulatory referral to PT/OT   2. Arthropathy of cervical spine  Ambulatory referral to PT/OT   3.  Right shoulder rotator cuff tear, ongoing therapy  Previous x-ray reviewed also regarding the cervical spine from 10/5/2017    [facility]thening    Plan: As outlined above    This transcription uses voice recognition software, which may contain typographical errors.  "

## 2021-06-14 NOTE — TELEPHONE ENCOUNTER
Central PA team  [phone]  Pool: HE PA MED ([id number])          PA has been initiated.       PA form completed and faxed insurance via Cover My Meds     Key:  [id number] - PA Case ID: [id number]     Medication:  hydrOXYzine HCl 25MG tablets    Insurance:  [employer]        Response will be received via fax and may take up to 5-10 business days depending on plan

## 2021-06-14 NOTE — TELEPHONE ENCOUNTER
Received a fax from pt's pharmacy [facility] saying that the Hydroxyzine is not cover by the pt's plan. Do you want to initiate a PA or change medication?     Patient's ID # [id number]  Pt's plan # [phone]

## 2021-06-14 NOTE — TELEPHONE ENCOUNTER
Please do prior authorization.    The patient takes 25 mg at bedtime for insomnia.  It is effective for him and he has no side effects

## 2021-06-15 NOTE — TELEPHONE ENCOUNTER
Controlled Substance Refill Request  Medication Name:   Requested Prescriptions     Pending Prescriptions Disp Refills     LORazepam (ATIVAN) 1 MG tablet [Pharmacy Med Name: LORAZEPAM 1MG TABLETS] 90 tablet 0     Sig: TAKE 1 TABLET(1 MG) BY MOUTH AT BEDTIME     Date Last Fill: 12/18/20  Requested Pharmacy: [facility]  Submit electronically to pharmacy  Controlled Substance Agreement on file:   Encounter-Level CSA Scan Date - 08/17/2017:    Scan on 8/21/2017  1:31 PM: [facility]        Last office visit:  11/24/20  Alia Aviles RN, MA  [facility]    Triage Nurse Advisor

## 2021-06-16 NOTE — TELEPHONE ENCOUNTER
RN cannot approve Refill Request    RN can NOT refill this medication med is not covered by policy/route to provider. Last office visit: 2/12/2018 Carter Mai MD Last Physical: 10/14/2019 Last MTM visit: Visit date not found Last visit same specialty: 8/22/2019 Justen Monahan MD.  Next visit within 3 mo: Visit date not found  Next physical within 3 mo: Visit date not found      Alia Aviles, [facility] Triage/Med Refill 4/10/2021    Requested Prescriptions   Pending Prescriptions Disp Refills     amLODIPine (NORVASC) 5 MG tablet [Pharmacy Med Name: AMLODIPINE BESYLATE 5MG TABLETS] 90 tablet 1     Sig: TAKE 1 TABLET(5 MG) BY MOUTH DAILY       Calcium-Channel Blockers Protocol Passed - 4/10/2021 11:11 AM        Passed - PCP or prescribing provider visit in past 12 months or next 3 months     Last office visit with prescriber/PCP: 2/12/2018 Carter Mai MD OR same dept: Visit date not found OR same specialty: 8/22/2019 Justen Monahan MD  Last physical: 10/14/2019 Last MTM visit: Visit date not found   Next visit within 3 mo: Visit date not found  Next physical within 3 mo: Visit date not found  Prescriber OR PCP: Carter Mai MD  Last diagnosis associated with med order: 1. Hypertension  - amLODIPine (NORVASC) 5 MG tablet [Pharmacy Med Name: AMLODIPINE BESYLATE 5MG TABLETS]; TAKE 1 TABLET(5 MG) BY MOUTH DAILY  Dispense: 90 tablet; Refill: 1    2. Pulmonary embolus (H)  - XARELTO 20 mg tablet [Pharmacy Med Name: XARELTO 20MG TABLETS]; TAKE 1 TABLET(20 MG) BY MOUTH DAILY WITH SUPPER  Dispense: 90 tablet; Refill: 1    3. Hyperlipidemia  - atorvastatin (LIPITOR) 40 MG tablet [Pharmacy Med Name: ATORVASTATIN 40MG TABLETS]; TAKE 1 TABLET BY MOUTH AT BEDTIME  Dispense: 90 tablet; Refill: 1    If protocol passes may refill for 12 months if within 3 months of last provider visit (or a total of 15 months).             Passed - Blood pressure filed in past 12 months     BP Readings from Last  1 Encounters:   11/24/20 140/70                XARELTO 20 mg tablet [Pharmacy Med Name: XARELTO 20MG TABLETS] 90 tablet 1     Sig: TAKE 1 TABLET(20 MG) BY MOUTH DAILY WITH SUPPER       Apixaban/Rivaroxaban/Dabigatran Refill Protocol Failed - 4/10/2021 11:11 AM        Failed - Route to appropriate pool/provider     Last Anticoagulation Summary:           Passed - Renal function test in last year     Creatinine   Date Value Ref Range Status   10/28/2020 1.05 0.70 - 1.30 mg/dL Final             Passed - PCP or prescribing provider visit in past 12 months       Last office visit with prescriber/PCP: 2/12/2018 Carter Mai MD OR same dept: Visit date not found OR same specialty: 8/22/2019 Justen Monahan MD  Last physical: 10/14/2019 Last MTM visit: Visit date not found   Next visit within 3 mo: Visit date not found  Next physical within 3 mo: Visit date not found  Prescriber OR PCP: Carter Mai MD  Last diagnosis associated with med order: 1. Hypertension  - amLODIPine (NORVASC) 5 MG tablet [Pharmacy Med Name: AMLODIPINE BESYLATE 5MG TABLETS]; TAKE 1 TABLET(5 MG) BY MOUTH DAILY  Dispense: 90 tablet; Refill: 1    2. Pulmonary embolus (H)  - XARELTO 20 mg tablet [Pharmacy Med Name: XARELTO 20MG TABLETS]; TAKE 1 TABLET(20 MG) BY MOUTH DAILY WITH SUPPER  Dispense: 90 tablet; Refill: 1    3. Hyperlipidemia  - atorvastatin (LIPITOR) 40 MG tablet [Pharmacy Med Name: ATORVASTATIN 40MG TABLETS]; TAKE 1 TABLET BY MOUTH AT BEDTIME  Dispense: 90 tablet; Refill: 1    If protocol passes may refill for 12 months if within 3 months of last provider visit (or a total of 15 months).                atorvastatin (LIPITOR) 40 MG tablet [Pharmacy Med Name: ATORVASTATIN 40MG TABLETS] 90 tablet 1     Sig: TAKE 1 TABLET BY MOUTH AT BEDTIME       Statins Refill Protocol (Hmg CoA Reductase Inhibitors) Passed - 4/10/2021 11:11 AM        Passed - PCP or prescribing provider visit in past 12 months      Last office visit with  prescriber/PCP: 2/12/2018 Carter Mai MD OR same dept: Visit date not found OR same specialty: 8/22/2019 Justen Monahan MD  Last physical: 10/14/2019 Last MTM visit: Visit date not found   Next visit within 3 mo: Visit date not found  Next physical within 3 mo: Visit date not found  Prescriber OR PCP: Carter Mai MD  Last diagnosis associated with med order: 1. Hypertension  - amLODIPine (NORVASC) 5 MG tablet [Pharmacy Med Name: AMLODIPINE BESYLATE 5MG TABLETS]; TAKE 1 TABLET(5 MG) BY MOUTH DAILY  Dispense: 90 tablet; Refill: 1    2. Pulmonary embolus (H)  - XARELTO 20 mg tablet [Pharmacy Med Name: XARELTO 20MG TABLETS]; TAKE 1 TABLET(20 MG) BY MOUTH DAILY WITH SUPPER  Dispense: 90 tablet; Refill: 1    3. Hyperlipidemia  - atorvastatin (LIPITOR) 40 MG tablet [Pharmacy Med Name: ATORVASTATIN 40MG TABLETS]; TAKE 1 TABLET BY MOUTH AT BEDTIME  Dispense: 90 tablet; Refill: 1    If protocol passes may refill for 12 months if within 3 months of last provider visit (or a total of 15 months).

## 2021-06-16 NOTE — PROGRESS NOTES
Subjective: This patient comes in for evaluation his blood pressures been elevated in the 150s 160s at home pulse in the 70s-80s.    Patient here was 186/72 recheck 160/70.    Patient continues on Xarelto he is on losartan 25 mg 1 a day he has been on that since September.    We discussed options.  He will go up to 25 mg of losartan take 2 a day and if needed go to 100 mg 1 a day.    I sent a new prescription for 100 mg tablets to the pharmacy.  He understands he may be taking a half a tablet if the 50 mg dose seems to be effective.    He denies any headache denies any swelling denies any chest pain shortness of breath    He had normal renal function back in October.    Tobacco status: He  reports that he has quit smoking. He has never used smokeless tobacco.    Patient Active Problem List    Diagnosis Date Noted     Polyp of colon, adenomatous 10/22/2016     Factor V Leiden mutation 06/28/2016     Prostate cancer 06/28/2016     Pulmonary embolus 02/17/2015     DVT (deep venous thrombosis) 02/17/2015     Peripheral Neuropathy      Urinary Frequency More Than Twice At Night (Nocturia)      Carcinoma Of The Prostate Gland      Hyperlipidemia      Hypertension      Leg Localized Swelling Right      Anxiety      Ringing In The Ears (Tinnitus)      Hearing Loss      Tendonitis        Current Outpatient Prescriptions   Medication Sig Dispense Refill     acetaminophen (TYLENOL) 500 MG tablet Take 500 mg by mouth every 6 (six) hours as needed for pain.       atorvastatin (LIPITOR) 40 MG tablet TAKE 1 TABLET BY MOUTH AT BEDTIME 90 tablet 2     loratadine 5 mg TbDL Take 10 mg by mouth daily as needed.        LORazepam (ATIVAN) 1 MG tablet TAKE 1 TABLET BY MOUTH EVERY NIGHT AT BEDTIME 90 tablet 0     multivitamin (MEN'S MULTI-VITAMIN) per tablet Take 1 tablet by mouth daily.       tadalafil (CIALIS) 5 MG tablet Take 5 mg by mouth daily as needed for erectile dysfunction.       XARELTO 20 mg Tab TAKE 1 TABLET(20 MG) BY MOUTH  "DAILY WITH SUPPER 90 tablet 1     losartan (COZAAR) 100 MG tablet Take 1 tablet (100 mg total) by mouth daily. 90 tablet 1     No current facility-administered medications for this visit.        ROS:   10 point review of symptoms negative other than as outlined above    Objective:    BP (!) 186/72  Pulse 85  Resp 20  Ht 5' 10\" (1.778 m)  Wt 183 lb (83 kg)  SpO2 98%  BMI 26.26 kg/m2  Body mass index is 26.26 kg/(m^2).    General appearance no acute distress    HEENT neck is negative oropharynx clear eyes without scleral icterus    Lungs are clear no rales or rhonchi heart regular S1-S2 rate in the 80s    No peripheral edema.    Labs from 10/5/2017 noted below    Results for orders placed or performed in visit on 10/05/17   Comprehensive Metabolic Panel   Result Value Ref Range    Sodium 143 136 - 145 mmol/L    Potassium 3.9 3.5 - 5.0 mmol/L    Chloride 106 98 - 107 mmol/L    CO2 27 22 - 31 mmol/L    Anion Gap, Calculation 10 5 - 18 mmol/L    Glucose 92 70 - 125 mg/dL    BUN 16 8 - 28 mg/dL    Creatinine 1.02 0.70 - 1.30 mg/dL    GFR MDRD Af Amer >60 >60 mL/min/1.73m2    GFR MDRD Non Af Amer >60 >60 mL/min/1.73m2    Bilirubin, Total 0.7 0.0 - 1.0 mg/dL    Calcium 9.2 8.5 - 10.5 mg/dL    Protein, Total 6.6 6.0 - 8.0 g/dL    Albumin 3.5 3.5 - 5.0 g/dL    Alkaline Phosphatase 63 45 - 120 U/L    AST 22 0 - 40 U/L    ALT 20 0 - 45 U/L   Lipid Cascade FASTING   Result Value Ref Range    Cholesterol 205 (H) <=199 mg/dL    Triglycerides 210 (H) <=149 mg/dL    HDL Cholesterol 57 >=40 mg/dL    LDL Calculated 106 <=129 mg/dL    Patient Fasting > 8hrs? Yes    HM2(CBC w/o Differential)   Result Value Ref Range    WBC 4.3 4.0 - 11.0 thou/uL    RBC 4.76 4.40 - 6.20 mill/uL    Hemoglobin 15.1 14.0 - 18.0 g/dL    Hematocrit 47.5 40.0 - 54.0 %     80 - 100 fL    MCH 31.6 27.0 - 34.0 pg    MCHC 31.7 (L) 32.0 - 36.0 g/dL    RDW 11.6 11.0 - 14.5 %    Platelets 133 (L) 140 - 440 thou/uL    MPV 7.4 7.0 - 10.0 fL "       Assessment:  1. Hypertension  losartan (COZAAR) 100 MG tablet     Suboptimal control increase losartan as outlined was go to 50 mg for a few days and then up to 100 mg if needed    Goal should be around 130/80 or less.    Plan: As outlined above he will stop in for lab only labs of creatinine and potassium in about a month    This transcription uses voice recognition software, which may contain typographical errors.

## 2021-06-17 NOTE — TELEPHONE ENCOUNTER
Telephone Encounter by Kirstie Franco at 1/15/2021  8:09 AM     Author: Kirstie Franco Service: -- Author Type: --    Filed: 1/15/2021  8:12 AM Encounter Date: 1/13/2021 Status: Signed    : Kirstie Franco APPROVED:    Approval start date: 01/14/2021  Approval end date:  Until further notice    Pharmacy has been notified of approval and will contact patient when medication is ready for pickup.

## 2021-06-17 NOTE — TELEPHONE ENCOUNTER
Refill Approved    Rx renewed per Medication Renewal Policy. Medication was last renewed on 10/16/20.    Thomas Clemons, [facility] Triage/Med Refill 5/14/2021     Requested Prescriptions   Pending Prescriptions Disp Refills     omeprazole (PRILOSEC) 20 MG capsule [Pharmacy Med Name: OMEPRAZOLE 20MG CAPSULES] 90 capsule 2     Sig: TAKE 1 CAPSULE BY MOUTH DAILY       GI Medications Refill Protocol Passed - 5/13/2021  1:15 PM        Passed - PCP or prescribing provider visit in last 12 or next 3 months.     Last office visit with prescriber/PCP: 2/12/2018 Carter Mai MD OR same dept: Visit date not found OR same specialty: 8/22/2019 Justen Monahan MD  Last physical: 10/14/2019 Last MTM visit: Visit date not found   Next visit within 3 mo: Visit date not found  Next physical within 3 mo: Visit date not found  Prescriber OR PCP: Carter Mai MD  Last diagnosis associated with med order: 1. Esophagitis  - omeprazole (PRILOSEC) 20 MG capsule [Pharmacy Med Name: OMEPRAZOLE 20MG CAPSULES]; TAKE 1 CAPSULE BY MOUTH DAILY  Dispense: 90 capsule; Refill: 2    2. Anxiety  - hydrOXYzine HCL (ATARAX) 25 MG tablet [Pharmacy Med Name: HYDROXYZINE HCL 25MG TABS (WHITE)]; TAKE 1 TABLET(25 MG) BY MOUTH AT BEDTIME  Dispense: 90 tablet; Refill: 1    3. Hypertension  - losartan (COZAAR) 100 MG tablet [Pharmacy Med Name: LOSARTAN 100MG TABLETS]; TAKE 1 TABLET(100 MG) BY MOUTH DAILY  Dispense: 90 tablet; Refill: 1    If protocol passes may refill for 12 months if within 3 months of last provider visit (or a total of 15 months).                hydrOXYzine HCL (ATARAX) 25 MG tablet [Pharmacy Med Name: HYDROXYZINE HCL 25MG TABS (WHITE)] 90 tablet 1     Sig: TAKE 1 TABLET(25 MG) BY MOUTH AT BEDTIME       Antihistamine Refill Protocol Passed - 5/13/2021  1:15 PM        Passed - Patient has had office visit/physical in last year     Last office visit with prescriber/PCP: 2/12/2018 Carter Mai MD OR same dept: Visit date  not found OR same specialty: 8/22/2019 Justen Monahan MD  Last physical: 10/14/2019 Last MTM visit: Visit date not found   Next visit within 3 mo: Visit date not found  Next physical within 3 mo: Visit date not found  Prescriber OR PCP: Carter Mai MD  Last diagnosis associated with med order: 1. Esophagitis  - omeprazole (PRILOSEC) 20 MG capsule [Pharmacy Med Name: OMEPRAZOLE 20MG CAPSULES]; TAKE 1 CAPSULE BY MOUTH DAILY  Dispense: 90 capsule; Refill: 2    2. Anxiety  - hydrOXYzine HCL (ATARAX) 25 MG tablet [Pharmacy Med Name: HYDROXYZINE HCL 25MG TABS (WHITE)]; TAKE 1 TABLET(25 MG) BY MOUTH AT BEDTIME  Dispense: 90 tablet; Refill: 1    3. Hypertension  - losartan (COZAAR) 100 MG tablet [Pharmacy Med Name: LOSARTAN 100MG TABLETS]; TAKE 1 TABLET(100 MG) BY MOUTH DAILY  Dispense: 90 tablet; Refill: 1    If protocol passes may refill for 12 months if within 3 months of last provider visit (or a total of 15 months).                losartan (COZAAR) 100 MG tablet [Pharmacy Med Name: LOSARTAN 100MG TABLETS] 90 tablet 1     Sig: TAKE 1 TABLET(100 MG) BY MOUTH DAILY       Angiotensin Receptor Blocker Protocol Passed - 5/13/2021  1:15 PM        Passed - PCP or prescribing provider visit in past 12 months       Last office visit with prescriber/PCP: 2/12/2018 Carter Mai MD OR same dept: Visit date not found OR same specialty: 8/22/2019 Justen Monahan MD  Last physical: 10/14/2019 Last MTM visit: Visit date not found   Next visit within 3 mo: Visit date not found  Next physical within 3 mo: Visit date not found  Prescriber OR PCP: Carter Mai MD  Last diagnosis associated with med order: 1. Esophagitis  - omeprazole (PRILOSEC) 20 MG capsule [Pharmacy Med Name: OMEPRAZOLE 20MG CAPSULES]; TAKE 1 CAPSULE BY MOUTH DAILY  Dispense: 90 capsule; Refill: 2    2. Anxiety  - hydrOXYzine HCL (ATARAX) 25 MG tablet [Pharmacy Med Name: HYDROXYZINE HCL 25MG TABS (WHITE)]; TAKE 1 TABLET(25 MG) BY MOUTH AT BEDTIME   Dispense: 90 tablet; Refill: 1    3. Hypertension  - losartan (COZAAR) 100 MG tablet [Pharmacy Med Name: LOSARTAN 100MG TABLETS]; TAKE 1 TABLET(100 MG) BY MOUTH DAILY  Dispense: 90 tablet; Refill: 1    If protocol passes may refill for 12 months if within 3 months of last provider visit (or a total of 15 months).             Passed - Serum potassium within the past 12 months     Lab Results   Component Value Date    Potassium 4.3 10/28/2020             Passed - Blood pressure filed in past 12 months     BP Readings from Last 1 Encounters:   11/24/20 140/70             Passed - Serum creatinine within the past 12 months     Creatinine   Date Value Ref Range Status   10/28/2020 1.05 0.70 - 1.30 mg/dL Final

## 2021-06-18 NOTE — PROGRESS NOTES
I met with Angel Luis Boykin at the request of Dr. Carter Mai to recheck his blood pressure.  Blood pressure medications on the MAR were reviewed with patient.    Patient has taken all medications as per usual regimen: Yes  Patient reports tolerating them without any issues or concerns: Yes    There were no vitals filed for this visit.    Blood pressure was taken, previous encounter was reviewed, recorded blood pressure below 140/90.  Patient was discharged and the note will be sent to the provider for final review.

## 2021-06-19 NOTE — LETTER
Letter by Justen Monahan MD at      Author: Justen Monahan MD Service: -- Author Type: --    Filed:  Encounter Date: 8/29/2019 Status: (Other)         Angel Luis Boykin  [address] [facility] [address]             August 29, 2019         Dear Mr. Boykin,    Below are the results from your recent visit:    Resulted Orders   Basic Metabolic Panel   Result Value Ref Range    Sodium 141 136 - 145 mmol/L    Potassium 4.3 3.5 - 5.0 mmol/L    Chloride 107 98 - 107 mmol/L    CO2 25 22 - 31 mmol/L    Anion Gap, Calculation 9 5 - 18 mmol/L    Glucose 89 70 - 125 mg/dL    Calcium 9.4 8.5 - 10.5 mg/dL    BUN 20 8 - 28 mg/dL    Creatinine 1.16 0.70 - 1.30 mg/dL    GFR MDRD Af Amer >60 >60 mL/min/1.73m2    GFR MDRD Non Af Amer >60 >60 mL/min/1.73m2    Narrative    Fasting Glucose reference range is 70-99 mg/dL per  American Diabetes Association (ADA) guidelines.   Zinc, Serum   Result Value Ref Range    Zinc, Serum/Plasma 86.0 60.0 - 120.0 ug/dL      Comment:      INTERPRETIVE INFORMATION: Zinc, Serum or Plasma    Elevated results may be due to skin or collection-related   contamination, including the use of a noncertified metal-free   collection/transport tube. If contamination concerns exist due to   elevated levels of serum/plasma zinc, confirmation with a second   specimen collected in a certified metal-free tube is recommended.    Circulating zinc concentrations are dependent on albumin status   and are depressed with malnutrition.  Zinc may also be lowered   with infection, inflammation, stress, oral contraceptives, and   pregnancy.  Zinc may be elevated with zinc supplementation or   fasting.  Elevated zinc concentrations may interfere with copper   absorption.     Test developed and characteristics determined by [facility]. See Compliance Statement B: [employer]  Performed by [facility],  [address] [place] [address] [phone]  [email or web address], Peter Green MD,  Lab. Director   JEFF Prep   Result Value Ref Range    KOH Prep No Yeast or Fungal Elements Seen No Yeast or Fungal Elements Seen   Vitamin B12   Result Value Ref Range    Vitamin B-12 1,032 (H) 213 - 816 pg/mL       Angel Luis, I am sorry I have not mailed this until now.  Your blood tests looked good.    Please call with questions or contact us using [employer].    Sincerely,        Electronically signed by Justen Monahan MD

## 2021-06-20 NOTE — PROGRESS NOTES
Here for  Anxiety  Hypertension denies chest pain or headache.    Losartan 100    Hyperlipidemia on statin denies muscle pain    Hx clot on xarelto   Four years.    No bleeding    Here to check bp  Feels heart pumping when I go to bed.  Harder, not faster.    Feels at this time but not as strong as when in bed.    Eventually sleeps.   Couple hours to fall asleep.    Feels 86% of the time.  Former .    Wants to know if he'll stroke and die    Symptoms Not related to food.    Retired.  [employer]le ball.  Golf.  Pickle ball over a week ago.  Doubles  60-90 minutes.   Feels fine.  During day when active is fine.      Feels it only when at home and in bed.  Takes one mg every night lorazepam for long time    Agrees is anxious but unclear why.  There is no reason.  .  Come to  with that.  Five and half years ago.   twice.  First age 27.   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    Post prostate surgery and without bph sx  ROS: as noted above    OBJECTIVE:   Vitals:    18 0950   BP: 144/64   Pulse:       Wt is noted.  No diaphoresis  Eyes: nl eom, anicteric   External ears, nose: nl    Neck: nl nodes, supple, thyroid normal   Lungs: clear to ausc   Heart: regular rhythm   without murmur or gallop.   One dropped beat with a minor pause which the pt says he did not feel        Pulse is in the mid to high 60s  Abd: soft nontender     No cva (renal) tenderness  Neuro: no weakness  Skin no rash  Joints: uninflamed   No ketotic breath odor noted  Mental: euthymic  Ext: nontender calves   Gait: normal    Body mass index is 26.3 kg/(m^2).       ASSESSMENT/PLAN:    sxs likely anxiety.  Discussed options of ativan dose change, ssri, or trial of hydroxyzine.   Agreed on hydroxyzine trial.  Discussed expected benefit and possible side effects    Hypertension poorly controlled/ add CCB    follow up pcp for bp and sx in couple wks  Additional diagnoses and related orders:  1. Anxiety   hydrOXYzine HCl (ATARAX) 25 MG tablet   2. Pulmonary embolus (H)     3. Hyperlipidemia     4. Hypertension  amLODIPine (NORVASC) 5 MG tablet

## 2021-06-21 NOTE — PROGRESS NOTES
Subjective: This patient comes in for annual wellness visit.  Please see the annual wellness visit health risk assessment he is in good health.  Staying active.  Eating appropriate fruits and vegetables no concerns at home he does have a hearing aid.    Has had no anxiety or depression issues he does have an advance care directive.    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  He is on Xarelto 20 mg daily    Blood pressures controlled with losartan and amlodipine he is on lipid medication, atorvastatin 40 mg daily.    Still has some sleeping issues and takes lorazepam at bedtime as well as hydroxyzine.    He has a history of prostate cancer and last PSA was less than 0.1 he sees Dr. Dubois from [facility], [place], operated by [facility] urology.        Other physicians that he is seeing include urologist , the dermatologist and ophthalmologist [facility].    Patient has had some ongoing tingling on the bottom of his feet/peripheral neuropathy no change.  He does not feel he wants any additional medication regarding that.    Labs today include vitamin D CMP lipid and CBC.    Immunizations included flu shot also the shingles shot #1.    Regarding colonoscopy he has adenomatous colon polyps he last had a colonoscopy in October 2016, needs follow-up colonoscopy October 2021 denies any bowel symptoms.  Denies any significant bladder symptoms, has nocturia x1    Does take MiraLAX as needed.  He said some increased constipation we discussed taking it daily for a while and increasing water to 8-10 glasses a day.    Tobacco status: He  reports that he has quit smoking. He has never used smokeless tobacco.    Patient Active Problem List    Diagnosis Date Noted     Polyp of colon, adenomatous 10/22/2016     Factor V Leiden mutation (H) 06/28/2016     Prostate cancer (H) 06/28/2016     Pulmonary embolus (H) 02/17/2015     DVT (deep venous thrombosis) (H) 02/17/2015     Peripheral  "Neuropathy      Urinary Frequency More Than Twice At Night (Nocturia)      Carcinoma Of The Prostate Gland      Hyperlipidemia      Hypertension      Leg Localized Swelling Right      Anxiety      Ringing In The Ears (Tinnitus)      Hearing Loss      Tendonitis        Current Outpatient Prescriptions   Medication Sig Dispense Refill     acetaminophen (TYLENOL) 500 MG tablet Take 500 mg by mouth every 6 (six) hours as needed for pain.       amLODIPine (NORVASC) 5 MG tablet Take 1 tablet (5 mg total) by mouth daily. 90 tablet 0     atorvastatin (LIPITOR) 40 MG tablet TAKE 1 TABLET BY MOUTH AT BEDTIME 90 tablet 3     hydrOXYzine HCl (ATARAX) 25 MG tablet One hs 60 tablet 5     loratadine 5 mg TbDL Take 10 mg by mouth daily as needed.        LORazepam (ATIVAN) 1 MG tablet TAKE 1 TABLET BY MOUTH EVERY NIGHT AT BEDTIME 90 tablet 0     losartan (COZAAR) 100 MG tablet TAKE 1 TABLET(100 MG) BY MOUTH DAILY 90 tablet 1     multivitamin (MEN'S MULTI-VITAMIN) per tablet Take 1 tablet by mouth daily.       tadalafil (CIALIS) 5 MG tablet Take 5 mg by mouth daily as needed for erectile dysfunction.       XARELTO 20 mg Tab TAKE 1 TABLET(20 MG) BY MOUTH DAILY WITH SUPPER 90 tablet 1     No current facility-administered medications for this visit.        ROS:   10 point review of systems negative other than as outlined above    Objective:    /64 (Patient Site: Left Arm, Patient Position: Sitting, Cuff Size: Adult Regular)  Pulse (!) 52  Temp 96.8  F (36  C) (Oral)   Resp 14  Ht 5' 9.25\" (1.759 m)  Wt 179 lb (81.2 kg)  SpO2 97%  BMI 26.24 kg/m2  Body mass index is 26.24 kg/(m^2).      General appearance no acute distress    HEENT neck is supple no adenopathy no bruit oropharynx is clear pupils react normally extraocular movements full.  Vision 10/16 on the right 10/16 on the left.    Lungs are clear no rales or rhonchi, heart was regular S1-S2, O2 sat was 97% heart rate was in the 50-60 range.    Abdomen soft no guarding " or rebound no masses    Extremities without edema skin was normal, no more worrisome lesions he said some actinic keratoses in the past.    Patient pulses normal he has some subjective numbness in the bottom of his feet    Genitalia normal descended testes no evidence of hernia rectal was done, patient's prostate is been removed no sign of recurrence..    No significant joint redness warmth or swelling his shoulder is doing better he still plays pickle ball but does not hit overhead shots because of the shoulder.    CMP was normal    LDL 88 HDL 54    CBC normal    Vitamin D at 33.4    Results for orders placed or performed in visit on 10/10/18   Comprehensive Metabolic Panel   Result Value Ref Range    Sodium 143 136 - 145 mmol/L    Potassium 4.3 3.5 - 5.0 mmol/L    Chloride 106 98 - 107 mmol/L    CO2 28 22 - 31 mmol/L    Anion Gap, Calculation 9 5 - 18 mmol/L    Glucose 89 70 - 125 mg/dL    BUN 21 8 - 28 mg/dL    Creatinine 1.08 0.70 - 1.30 mg/dL    GFR MDRD Af Amer >60 >60 mL/min/1.73m2    GFR MDRD Non Af Amer >60 >60 mL/min/1.73m2    Bilirubin, Total 1.0 0.0 - 1.0 mg/dL    Calcium 9.4 8.5 - 10.5 mg/dL    Protein, Total 6.7 6.0 - 8.0 g/dL    Albumin 3.8 3.5 - 5.0 g/dL    Alkaline Phosphatase 50 45 - 120 U/L    AST 21 0 - 40 U/L    ALT 22 0 - 45 U/L   Lipid Cascade FASTING   Result Value Ref Range    Cholesterol 177 <=199 mg/dL    Triglycerides 173 (H) <=149 mg/dL    HDL Cholesterol 54 >=40 mg/dL    LDL Calculated 88 <=129 mg/dL    Patient Fasting > 8hrs? Yes    HM2(CBC w/o Differential)   Result Value Ref Range    WBC 4.3 4.0 - 11.0 thou/uL    RBC 4.82 4.40 - 6.20 mill/uL    Hemoglobin 15.4 14.0 - 18.0 g/dL    Hematocrit 46.5 40.0 - 54.0 %    MCV 97 80 - 100 fL    MCH 32.0 27.0 - 34.0 pg    MCHC 33.1 32.0 - 36.0 g/dL    RDW 12.0 11.0 - 14.5 %    Platelets 164 140 - 440 thou/uL    MPV 8.3 7.0 - 10.0 fL   Vitamin D, Total (25-Hydroxy)   Result Value Ref Range    Vitamin D, Total (25-Hydroxy) 33.4 30.0 - 80.0 ng/mL        Assessment:  1. Medicare annual wellness visit, subsequent  Vision Screening    Comprehensive Metabolic Panel    HM2(CBC w/o Differential)    Vitamin D, Total (25-Hydroxy)   2. Pulmonary embolus (H)     3. Hyperlipidemia  Comprehensive Metabolic Panel    Lipid Cascade FASTING   4. Hypertension  Comprehensive Metabolic Panel   5. Polyp of colon, adenomatous     6. Factor V Leiden mutation (H)     7. Prostate cancer (H)     8. Need for shingles vaccine  Varicella Zoster, Recombinant Vaccine IM   9. Flu vaccine need  Influenza High Dose, Seasonal     Stable annual wellness visit please see above    Continue Xarelto for pulmonary embolism and factor V Leyden mutation.    Due for colonoscopy October 2021    Hypertension controlled continue same meds    History of prostate cancer continue to follow with urologist.    Immunization update with flu shot and for shingles shot, neck shingles shot in 2-6 months    Plan: As outlined above does have some constipation issues will increase MiraLAX to take it daily and increase water 8-10 glasses a day.  Increase fruits and vegetable and diet.      This transcription uses voice recognition software, which may contain typographical errors.

## 2021-06-21 NOTE — LETTER
Letter by Justen Monahan MD at      Author: Justen Monahan MD Service: -- Author Type: --    Filed:  Encounter Date: 10/29/2020 Status: (Other)         Angel Luis Boykin  [address] [address]             October 29, 2020         Dear Mr. Boykin,    Below are the results from your recent visit:    Resulted Orders   Lipid Cascade   Result Value Ref Range    Cholesterol 189 <=199 mg/dL    Triglycerides 198 (H) <=149 mg/dL    HDL Cholesterol 54 >=40 mg/dL    LDL Calculated 95 <=129 mg/dL    Patient Fasting > 8hrs? Yes    ALT (SGPT)   Result Value Ref Range    ALT 19 0 - 45 U/L   Basic Metabolic Panel   Result Value Ref Range    Sodium 141 136 - 145 mmol/L    Potassium 4.3 3.5 - 5.0 mmol/L    Chloride 105 98 - 107 mmol/L    CO2 26 22 - 31 mmol/L    Anion Gap, Calculation 10 5 - 18 mmol/L    Glucose 86 70 - 125 mg/dL    Calcium 9.1 8.5 - 10.5 mg/dL    BUN 19 8 - 28 mg/dL    Creatinine 1.05 0.70 - 1.30 mg/dL    GFR MDRD Af Amer >60 >60 mL/min/1.73m2    GFR MDRD Non Af Amer >60 >60 mL/min/1.73m2    Narrative    Fasting Glucose reference range is 70-99 mg/dL per  American Diabetes Association (ADA) guidelines.   Erythrocyte Sedimentation Rate   Result Value Ref Range    Sed Rate 16 (H) 0 - 15 mm/hr   Hepatitis C Antibody (Anti-HCV)   Result Value Ref Range    Hepatitis C Ab Negative Negative   HM2(CBC w/o Differential)   Result Value Ref Range    WBC 4.3 4.0 - 11.0 thou/uL    RBC 4.77 4.40 - 6.20 mill/uL    Hemoglobin 15.2 14.0 - 18.0 g/dL    Hematocrit 46.4 40.0 - 54.0 %    MCV 97 80 - 100 fL    MCH 31.9 27.0 - 34.0 pg    MCHC 32.8 32.0 - 36.0 g/dL    RDW 13.4 11.0 - 14.5 %    Platelets 157 140 - 440 thou/uL    MPV 8.1 7.0 - 10.0 fL   Thyroid Stimulating Hormone (TSH)   Result Value Ref Range    TSH 1.67 0.30 - 5.00 uIU/mL   Vitamin B12   Result Value Ref Range    Vitamin B-12 993 (H) 213 - 816 pg/mL       Nicolas, here are your blood tests.  They all look okay.  Most of them were done  to look into causes for neuropathy.    Please call with questions or contact us using [facility].    Sincerely,        Electronically signed by Justen Monahan MD

## 2021-06-23 NOTE — TELEPHONE ENCOUNTER
Refill Approved    Rx renewed per Medication Renewal Policy. Medication was last renewed on 8/7/18.    Darlene Gallo, [facility] Triage/Med Refill 1/27/2019     Requested Prescriptions   Pending Prescriptions Disp Refills     losartan (COZAAR) 100 MG tablet [Pharmacy Med Name: LOSARTAN 100MG TABLETS] 90 tablet 0     Sig: TAKE 1 TABLET(100 MG) BY MOUTH DAILY    Angiotensin Receptor Blocker Protocol Passed - 1/24/2019  9:31 AM       Passed - PCP or prescribing provider visit in past 12 months      Last office visit with prescriber/PCP: 2/12/2018 Carter Mai MD OR same dept: 8/23/2018 Tuan Ng MD OR same specialty: 8/23/2018 Tuan Ng MD  Last physical: 10/10/2018 Last MTM visit: Visit date not found   Next visit within 3 mo: Visit date not found  Next physical within 3 mo: Visit date not found  Prescriber OR PCP: Carter Mai MD  Last diagnosis associated with med order: 1. Hypertension  - losartan (COZAAR) 100 MG tablet [Pharmacy Med Name: LOSARTAN 100MG TABLETS]; TAKE 1 TABLET(100 MG) BY MOUTH DAILY  Dispense: 90 tablet; Refill: 0    If protocol passes may refill for 12 months if within 3 months of last provider visit (or a total of 15 months).            Passed - Serum potassium within the past 12 months    Lab Results   Component Value Date    Potassium 4.3 10/10/2018            Passed - Blood pressure filed in past 12 months    BP Readings from Last 1 Encounters:   10/10/18 130/64            Passed - Serum creatinine within the past 12 months    Creatinine   Date Value Ref Range Status   10/10/2018 1.08 0.70 - 1.30 mg/dL Final

## 2021-06-24 NOTE — TELEPHONE ENCOUNTER
Controlled Substance Refill Request  Medication:   Requested Prescriptions     Pending Prescriptions Disp Refills     LORazepam (ATIVAN) 1 MG tablet [Pharmacy Med Name: LORAZEPAM 1MG TABLETS] 90 tablet 0     Sig: TAKE 1 TABLET BY MOUTH EVERY NIGHT AT BEDTIME     Date Last Fill: 11/27/18  [facility]    Submit electronically to pharmacy  Controlled Substance Agreement on File:   Encounter-Level CSA Scan Date - 08/17/2017:    Scan on 8/21/2017  1:31 PM: [facility] (below)         Last office visit: Last office visit pertaining to requested medication was 10/10/18.

## 2021-06-25 NOTE — TELEPHONE ENCOUNTER
Controlled Substance Refill Request  Medication Name:   Requested Prescriptions     Pending Prescriptions Disp Refills     LORazepam (ATIVAN) 1 MG tablet [Pharmacy Med Name: LORAZEPAM 1MG TABLETS] 90 tablet 0     Sig: TAKE 1 TABLET(1 MG) BY MOUTH AT BEDTIME     Date Last Fill: 3/16/21  [facility]  Submit electronically to pharmacy  Controlled Substance Agreement on file:   Encounter-Level CSA Scan Date - 08/17/2017:    Scan on 8/21/2017  1:31 PM: [facility]        Last office visit:  11/24/20  Alia Aviles RN, MA  [facility]    Triage Nurse Advisor

## 2021-07-03 NOTE — ADDENDUM NOTE
Addendum Note by Conchis Fletcher LPN at 4/5/2017 11:59 PM     Author: Conchis Fletcher LPN Service: -- Author Type: Licensed Nurse    Filed: 4/6/2017  7:57 AM Date of Service: 4/5/2017 11:59 PM Status: Signed    : Conchis Fletcher LPN (Licensed Nurse)    Encounter addended by: Conchis Fletcher LPN on: 4/6/2017  7:57 AM<BR>     Actions taken: Visit Navigator Flowsheet section accepted

## 2021-07-10 ENCOUNTER — COMMUNICATION - HEALTHEAST (OUTPATIENT)
Dept: FAMILY MEDICINE | Facility: CLINIC | Age: 81
End: 2021-07-10

## 2021-07-10 DIAGNOSIS — I10 HYPERTENSION: ICD-10-CM

## 2021-07-10 DIAGNOSIS — E78.5 HYPERLIPIDEMIA: ICD-10-CM

## 2021-07-10 DIAGNOSIS — I26.99 PULMONARY EMBOLUS (H): ICD-10-CM

## 2021-07-11 DIAGNOSIS — I10 BENIGN ESSENTIAL HYPERTENSION: ICD-10-CM

## 2021-07-11 DIAGNOSIS — I10 HYPERTENSION: ICD-10-CM

## 2021-07-11 DIAGNOSIS — Z86.711 HISTORY OF PULMONARY EMBOLISM: ICD-10-CM

## 2021-07-11 DIAGNOSIS — E78.5 HYPERLIPIDEMIA LDL GOAL <100: Primary | ICD-10-CM

## 2021-07-11 DIAGNOSIS — I26.99 PULMONARY EMBOLUS (H): ICD-10-CM

## 2021-07-11 DIAGNOSIS — E78.5 HYPERLIPIDEMIA: ICD-10-CM

## 2021-07-11 NOTE — TELEPHONE ENCOUNTER
Telephone Encounter by Deisy Robison RN at 7/11/2021  2:07 PM     Author: Deisy Robison RN Service: -- Author Type: Registered Nurse    Filed: 7/11/2021  2:07 PM Encounter Date: 7/10/2021 Status: Signed    : Deisy Robison RN (Registered Nurse)       RN cannot approve Refill Request    RN can NOT refill this medication med is not covered by policy/route to provider. Last office visit: 2/12/2018 Carter Mai MD Last Physical: 10/14/2019 Last MTM visit: Visit date not found Last visit same specialty: 8/22/2019 Justen Monahan MD.  Next visit within 3 mo: Visit date not found  Next physical within 3 mo: Visit date not found      Deisy Robison, [facility] Connection Triage/Med Refill 7/11/2021    Requested Prescriptions   Pending Prescriptions Disp Refills   ? XARELTO 20 mg tablet [Pharmacy Med Name: XARELTO 20MG TABLETS] 90 tablet 0     Sig: TAKE 1 TABLET(20 MG) BY MOUTH DAILY WITH SUPPER       Apixaban/Rivaroxaban/Dabigatran Refill Protocol Failed - 7/10/2021  9:37 AM        Failed - Route to appropriate pool/provider     Last Anticoagulation Summary:           Passed - Renal function test in last year     Creatinine   Date Value Ref Range Status   10/28/2020 1.05 0.70 - 1.30 mg/dL Final             Passed - PCP or prescribing provider visit in past 12 months       Last office visit with prescriber/PCP: 2/12/2018 Carter Mai MD OR same dept: Visit date not found OR same specialty: 8/22/2019 Justen Monahan MD  Last physical: 10/14/2019 Last MTM visit: Visit date not found   Next visit within 3 mo: Visit date not found  Next physical within 3 mo: Visit date not found  Prescriber OR PCP: Carter Mai MD  Last diagnosis associated with med order: 1. Pulmonary embolus (H)  - XARELTO 20 mg tablet [Pharmacy Med Name: XARELTO 20MG TABLETS]; TAKE 1 TABLET(20 MG) BY MOUTH DAILY WITH SUPPER  Dispense: 90 tablet; Refill: 0    2. Hypertension  - amLODIPine (NORVASC) 5 MG tablet [Pharmacy  Med Name: AMLODIPINE BESYLATE 5MG TABLETS]; TAKE 1 TABLET(5 MG) BY MOUTH DAILY  Dispense: 90 tablet; Refill: 0    3. Hyperlipidemia  - atorvastatin (LIPITOR) 40 MG tablet [Pharmacy Med Name: ATORVASTATIN 40MG TABLETS]; TAKE 1 TABLET BY MOUTH AT BEDTIME  Dispense: 90 tablet; Refill: 0    If protocol passes may refill for 12 months if within 3 months of last provider visit (or a total of 15 months).              ? amLODIPine (NORVASC) 5 MG tablet [Pharmacy Med Name: AMLODIPINE BESYLATE 5MG TABLETS] 90 tablet 0     Sig: TAKE 1 TABLET(5 MG) BY MOUTH DAILY       Calcium-Channel Blockers Protocol Passed - 7/10/2021  9:37 AM        Passed - PCP or prescribing provider visit in past 12 months or next 3 months     Last office visit with prescriber/PCP: 2/12/2018 Carter Mai MD OR same dept: Visit date not found OR same specialty: 8/22/2019 Justen Monahan MD  Last physical: 10/14/2019 Last MTM visit: Visit date not found   Next visit within 3 mo: Visit date not found  Next physical within 3 mo: Visit date not found  Prescriber OR PCP: Carter Mai MD  Last diagnosis associated with med order: 1. Pulmonary embolus (H)  - XARELTO 20 mg tablet [Pharmacy Med Name: XARELTO 20MG TABLETS]; TAKE 1 TABLET(20 MG) BY MOUTH DAILY WITH SUPPER  Dispense: 90 tablet; Refill: 0    2. Hypertension  - amLODIPine (NORVASC) 5 MG tablet [Pharmacy Med Name: AMLODIPINE BESYLATE 5MG TABLETS]; TAKE 1 TABLET(5 MG) BY MOUTH DAILY  Dispense: 90 tablet; Refill: 0    3. Hyperlipidemia  - atorvastatin (LIPITOR) 40 MG tablet [Pharmacy Med Name: ATORVASTATIN 40MG TABLETS]; TAKE 1 TABLET BY MOUTH AT BEDTIME  Dispense: 90 tablet; Refill: 0    If protocol passes may refill for 12 months if within 3 months of last provider visit (or a total of 15 months).             Passed - Blood pressure filed in past 12 months     BP Readings from Last 1 Encounters:   11/24/20 140/70              ? atorvastatin (LIPITOR) 40 MG tablet [Pharmacy Med Name:  ATORVASTATIN 40MG TABLETS] 90 tablet 0     Sig: TAKE 1 TABLET BY MOUTH AT BEDTIME       Statins Refill Protocol (Hmg CoA Reductase Inhibitors) Passed - 7/10/2021  9:37 AM        Passed - PCP or prescribing provider visit in past 12 months      Last office visit with prescriber/PCP: 2/12/2018 Carter Mai MD OR same dept: Visit date not found OR same specialty: 8/22/2019 Justen Monahan MD  Last physical: 10/14/2019 Last MTM visit: Visit date not found   Next visit within 3 mo: Visit date not found  Next physical within 3 mo: Visit date not found  Prescriber OR PCP: Carter Mai MD  Last diagnosis associated with med order: 1. Pulmonary embolus (H)  - XARELTO 20 mg tablet [Pharmacy Med Name: XARELTO 20MG TABLETS]; TAKE 1 TABLET(20 MG) BY MOUTH DAILY WITH SUPPER  Dispense: 90 tablet; Refill: 0    2. Hypertension  - amLODIPine (NORVASC) 5 MG tablet [Pharmacy Med Name: AMLODIPINE BESYLATE 5MG TABLETS]; TAKE 1 TABLET(5 MG) BY MOUTH DAILY  Dispense: 90 tablet; Refill: 0    3. Hyperlipidemia  - atorvastatin (LIPITOR) 40 MG tablet [Pharmacy Med Name: ATORVASTATIN 40MG TABLETS]; TAKE 1 TABLET BY MOUTH AT BEDTIME  Dispense: 90 tablet; Refill: 0    If protocol passes may refill for 12 months if within 3 months of last provider visit (or a total of 15 months).                     As part of the required manual data conversion process for integration, this encounter was created to document a refill encounter. This information was copied from the [facility] patient's chart to the [facility] patient chart.     Deisy Robison

## 2021-07-11 NOTE — TELEPHONE ENCOUNTER
RN cannot approve Refill Request    RN can NOT refill this medication med is not covered by policy/route to provider. Last office visit:11/24/2020      Deisy Robison, [facility] Connection Triage/Med Refill 7/11/2021    Requested Prescriptions   Pending Prescriptions Disp Refills     XARELTO 20 mg tablet [Pharmacy Med Name: XARELTO 20MG TABLETS] 90 tablet 0     Sig: TAKE 1 TABLET(20 MG) BY MOUTH DAILY WITH SUPPER       Apixaban/Rivaroxaban/Dabigatran Refill Protocol Failed - 7/10/2021  9:37 AM        Failed - Route to appropriate pool/provider     Last Anticoagulation Summary:           Passed - Renal function test in last year     Creatinine   Date Value Ref Range Status   10/28/2020 1.05 0.70 - 1.30 mg/dL Final             Passed - PCP or prescribing provider visit in past 12 months       Last office visit with prescriber/PCP: 2/12/2018 Carter Mai MD OR same dept: Visit date not found OR same specialty: 8/22/2019 Justen Monahan MD  Last physical: 10/14/2019 Last MTM visit: Visit date not found   Next visit within 3 mo: Visit date not found  Next physical within 3 mo: Visit date not found  Prescriber OR PCP: Carter Mai MD  Last diagnosis associated with med order: 1. Pulmonary embolus (H)  - XARELTO 20 mg tablet [Pharmacy Med Name: XARELTO 20MG TABLETS]; TAKE 1 TABLET(20 MG) BY MOUTH DAILY WITH SUPPER  Dispense: 90 tablet; Refill: 0    2. Hypertension  - amLODIPine (NORVASC) 5 MG tablet [Pharmacy Med Name: AMLODIPINE BESYLATE 5MG TABLETS]; TAKE 1 TABLET(5 MG) BY MOUTH DAILY  Dispense: 90 tablet; Refill: 0    3. Hyperlipidemia  - atorvastatin (LIPITOR) 40 MG tablet [Pharmacy Med Name: ATORVASTATIN 40MG TABLETS]; TAKE 1 TABLET BY MOUTH AT BEDTIME  Dispense: 90 tablet; Refill: 0    If protocol passes may refill for 12 months if within 3 months of last provider visit (or a total of 15 months).                amLODIPine (NORVASC) 5 MG tablet [Pharmacy Med Name: AMLODIPINE BESYLATE 5MG TABLETS] 90 tablet 0      Sig: TAKE 1 TABLET(5 MG) BY MOUTH DAILY       Calcium-Channel Blockers Protocol Passed - 7/10/2021  9:37 AM        Passed - PCP or prescribing provider visit in past 12 months or next 3 months     Last office visit with prescriber/PCP: 2/12/2018 Carter Mai MD OR same dept: Visit date not found OR same specialty: 8/22/2019 Justen Monahan MD  Last physical: 10/14/2019 Last MTM visit: Visit date not found   Next visit within 3 mo: Visit date not found  Next physical within 3 mo: Visit date not found  Prescriber OR PCP: Carter Mai MD  Last diagnosis associated with med order: 1. Pulmonary embolus (H)  - XARELTO 20 mg tablet [Pharmacy Med Name: XARELTO 20MG TABLETS]; TAKE 1 TABLET(20 MG) BY MOUTH DAILY WITH SUPPER  Dispense: 90 tablet; Refill: 0    2. Hypertension  - amLODIPine (NORVASC) 5 MG tablet [Pharmacy Med Name: AMLODIPINE BESYLATE 5MG TABLETS]; TAKE 1 TABLET(5 MG) BY MOUTH DAILY  Dispense: 90 tablet; Refill: 0    3. Hyperlipidemia  - atorvastatin (LIPITOR) 40 MG tablet [Pharmacy Med Name: ATORVASTATIN 40MG TABLETS]; TAKE 1 TABLET BY MOUTH AT BEDTIME  Dispense: 90 tablet; Refill: 0    If protocol passes may refill for 12 months if within 3 months of last provider visit (or a total of 15 months).             Passed - Blood pressure filed in past 12 months     BP Readings from Last 1 Encounters:   11/24/20 140/70                atorvastatin (LIPITOR) 40 MG tablet [Pharmacy Med Name: ATORVASTATIN 40MG TABLETS] 90 tablet 0     Sig: TAKE 1 TABLET BY MOUTH AT BEDTIME       Statins Refill Protocol (Hmg CoA Reductase Inhibitors) Passed - 7/10/2021  9:37 AM        Passed - PCP or prescribing provider visit in past 12 months      Last office visit with prescriber/PCP: 2/12/2018 Carter Mai MD OR same dept: Visit date not found OR same specialty: 8/22/2019 Justen Monahan MD  Last physical: 10/14/2019 Last MTM visit: Visit date not found   Next visit within 3 mo: Visit date not found  Next  physical within 3 mo: Visit date not found  Prescriber OR PCP: Carter Mai MD  Last diagnosis associated with med order: 1. Pulmonary embolus (H)  - XARELTO 20 mg tablet [Pharmacy Med Name: XARELTO 20MG TABLETS]; TAKE 1 TABLET(20 MG) BY MOUTH DAILY WITH SUPPER  Dispense: 90 tablet; Refill: 0    2. Hypertension  - amLODIPine (NORVASC) 5 MG tablet [Pharmacy Med Name: AMLODIPINE BESYLATE 5MG TABLETS]; TAKE 1 TABLET(5 MG) BY MOUTH DAILY  Dispense: 90 tablet; Refill: 0    3. Hyperlipidemia  - atorvastatin (LIPITOR) 40 MG tablet [Pharmacy Med Name: ATORVASTATIN 40MG TABLETS]; TAKE 1 TABLET BY MOUTH AT BEDTIME  Dispense: 90 tablet; Refill: 0    If protocol passes may refill for 12 months if within 3 months of last provider visit (or a total of 15 months).                     As part of the required manual data conversion process for integration, this encounter was created to document a refill encounter. This information was copied from the [facility] patient's chart to the [facility] patient chart.     Deisy Robison

## 2021-07-12 RX ORDER — ATORVASTATIN CALCIUM 40 MG/1
TABLET, FILM COATED ORAL
Qty: 90 TABLET | Refills: 3 | Status: SHIPPED | OUTPATIENT
Start: 2021-07-12 | End: 2021-09-12

## 2021-07-12 RX ORDER — AMLODIPINE BESYLATE 5 MG/1
TABLET ORAL
Qty: 90 TABLET | Refills: 3 | Status: SHIPPED | OUTPATIENT
Start: 2021-07-12 | End: 2021-10-13 | Stop reason: DRUGHIGH

## 2021-07-15 ENCOUNTER — TRANSFERRED RECORDS (OUTPATIENT)
Dept: HEALTH INFORMATION MANAGEMENT | Facility: CLINIC | Age: 81
End: 2021-07-15

## 2021-07-15 DIAGNOSIS — Z86.711 HISTORY OF PULMONARY EMBOLISM: ICD-10-CM

## 2021-07-15 RX ORDER — RIVAROXABAN 20 MG/1
TABLET, FILM COATED ORAL
Qty: 90 TABLET | Refills: 0 | Status: SHIPPED | OUTPATIENT
Start: 2021-07-15 | End: 2022-01-18

## 2021-08-19 ENCOUNTER — MYC MEDICAL ADVICE (OUTPATIENT)
Dept: FAMILY MEDICINE | Facility: CLINIC | Age: 81
End: 2021-08-19

## 2021-08-19 NOTE — TELEPHONE ENCOUNTER
Pt message to you.       Dr. Mai, please call me regarding my upcoming physical.  I will be home until 5:30 today and all afternoon tomorrow.      Nicolas Boykin  [phone]

## 2021-09-08 ENCOUNTER — OFFICE VISIT (OUTPATIENT)
Dept: FAMILY MEDICINE | Facility: CLINIC | Age: 81
End: 2021-09-08
Payer: MEDICARE

## 2021-09-08 ENCOUNTER — MYC MEDICAL ADVICE (OUTPATIENT)
Dept: FAMILY MEDICINE | Facility: CLINIC | Age: 81
End: 2021-09-08

## 2021-09-08 VITALS
RESPIRATION RATE: 18 BRPM | WEIGHT: 177 LBS | DIASTOLIC BLOOD PRESSURE: 68 MMHG | SYSTOLIC BLOOD PRESSURE: 132 MMHG | BODY MASS INDEX: 24.78 KG/M2 | TEMPERATURE: 97.6 F | HEART RATE: 67 BPM | HEIGHT: 71 IN

## 2021-09-08 DIAGNOSIS — D12.6 ADENOMATOUS POLYP OF COLON, UNSPECIFIED PART OF COLON: ICD-10-CM

## 2021-09-08 DIAGNOSIS — R53.83 OTHER FATIGUE: ICD-10-CM

## 2021-09-08 DIAGNOSIS — D68.51 FACTOR V LEIDEN MUTATION (H): ICD-10-CM

## 2021-09-08 DIAGNOSIS — Z86.711 HISTORY OF PULMONARY EMBOLISM: ICD-10-CM

## 2021-09-08 DIAGNOSIS — M54.41 ACUTE RIGHT-SIDED LOW BACK PAIN WITH RIGHT-SIDED SCIATICA: ICD-10-CM

## 2021-09-08 DIAGNOSIS — C61 MALIGNANT NEOPLASM OF PROSTATE (H): ICD-10-CM

## 2021-09-08 DIAGNOSIS — G47.00 INSOMNIA, UNSPECIFIED TYPE: ICD-10-CM

## 2021-09-08 DIAGNOSIS — Z00.00 ENCOUNTER FOR MEDICARE ANNUAL WELLNESS EXAM: Primary | ICD-10-CM

## 2021-09-08 DIAGNOSIS — Z71.85 IMMUNIZATION COUNSELING: ICD-10-CM

## 2021-09-08 DIAGNOSIS — I10 ESSENTIAL HYPERTENSION: ICD-10-CM

## 2021-09-08 DIAGNOSIS — E55.9 VITAMIN D DEFICIENCY: ICD-10-CM

## 2021-09-08 DIAGNOSIS — E78.5 HYPERLIPIDEMIA, UNSPECIFIED HYPERLIPIDEMIA TYPE: ICD-10-CM

## 2021-09-08 LAB
ALBUMIN SERPL-MCNC: 3.9 G/DL (ref 3.5–5)
ALP SERPL-CCNC: 70 U/L (ref 45–120)
ALT SERPL W P-5'-P-CCNC: 24 U/L (ref 0–45)
ANION GAP SERPL CALCULATED.3IONS-SCNC: 13 MMOL/L (ref 5–18)
AST SERPL W P-5'-P-CCNC: 22 U/L (ref 0–40)
BILIRUB SERPL-MCNC: 1 MG/DL (ref 0–1)
BUN SERPL-MCNC: 19 MG/DL (ref 8–28)
CALCIUM SERPL-MCNC: 9.2 MG/DL (ref 8.5–10.5)
CHLORIDE BLD-SCNC: 104 MMOL/L (ref 98–107)
CHOLEST SERPL-MCNC: 262 MG/DL
CO2 SERPL-SCNC: 25 MMOL/L (ref 22–31)
CREAT SERPL-MCNC: 1.12 MG/DL (ref 0.7–1.3)
ERYTHROCYTE [DISTWIDTH] IN BLOOD BY AUTOMATED COUNT: 14.3 % (ref 10–15)
GFR SERPL CREATININE-BSD FRML MDRD: 62 ML/MIN/1.73M2
GLUCOSE BLD-MCNC: 89 MG/DL (ref 70–125)
HCT VFR BLD AUTO: 45.7 % (ref 40–53)
HDLC SERPL-MCNC: 54 MG/DL
HGB BLD-MCNC: 15.1 G/DL (ref 13.3–17.7)
LDLC SERPL CALC-MCNC: 147 MG/DL
MCH RBC QN AUTO: 31.5 PG (ref 26.5–33)
MCHC RBC AUTO-ENTMCNC: 33 G/DL (ref 31.5–36.5)
MCV RBC AUTO: 95 FL (ref 78–100)
PLATELET # BLD AUTO: 151 10E3/UL (ref 150–450)
POTASSIUM BLD-SCNC: 3.9 MMOL/L (ref 3.5–5)
PROT SERPL-MCNC: 6.7 G/DL (ref 6–8)
RBC # BLD AUTO: 4.8 10E6/UL (ref 4.4–5.9)
SODIUM SERPL-SCNC: 142 MMOL/L (ref 136–145)
TRIGL SERPL-MCNC: 306 MG/DL
TSH SERPL DL<=0.005 MIU/L-ACNC: 1.63 UIU/ML (ref 0.3–5)
WBC # BLD AUTO: 5.8 10E3/UL (ref 4–11)

## 2021-09-08 PROCEDURE — 85027 COMPLETE CBC AUTOMATED: CPT | Performed by: FAMILY MEDICINE

## 2021-09-08 PROCEDURE — 36415 COLL VENOUS BLD VENIPUNCTURE: CPT | Performed by: FAMILY MEDICINE

## 2021-09-08 PROCEDURE — 84443 ASSAY THYROID STIM HORMONE: CPT | Performed by: FAMILY MEDICINE

## 2021-09-08 PROCEDURE — 82306 VITAMIN D 25 HYDROXY: CPT | Performed by: FAMILY MEDICINE

## 2021-09-08 PROCEDURE — 80053 COMPREHEN METABOLIC PANEL: CPT | Performed by: FAMILY MEDICINE

## 2021-09-08 PROCEDURE — 99213 OFFICE O/P EST LOW 20 MIN: CPT | Mod: 25 | Performed by: FAMILY MEDICINE

## 2021-09-08 PROCEDURE — G0439 PPPS, SUBSEQ VISIT: HCPCS | Performed by: FAMILY MEDICINE

## 2021-09-08 PROCEDURE — G0008 ADMIN INFLUENZA VIRUS VAC: HCPCS | Performed by: FAMILY MEDICINE

## 2021-09-08 PROCEDURE — 80061 LIPID PANEL: CPT | Performed by: FAMILY MEDICINE

## 2021-09-08 PROCEDURE — 84270 ASSAY OF SEX HORMONE GLOBUL: CPT | Performed by: FAMILY MEDICINE

## 2021-09-08 PROCEDURE — 90662 IIV NO PRSV INCREASED AG IM: CPT | Performed by: FAMILY MEDICINE

## 2021-09-08 PROCEDURE — 84403 ASSAY OF TOTAL TESTOSTERONE: CPT | Performed by: FAMILY MEDICINE

## 2021-09-08 RX ORDER — LORAZEPAM 1 MG/1
TABLET ORAL
Qty: 90 TABLET | Refills: 0 | Status: SHIPPED | OUTPATIENT
Start: 2021-09-08 | End: 2021-12-14

## 2021-09-08 RX ORDER — METHYLPREDNISOLONE 4 MG
TABLET, DOSE PACK ORAL
Qty: 21 TABLET | Refills: 1 | Status: SHIPPED | OUTPATIENT
Start: 2021-09-08 | End: 2021-10-25

## 2021-09-08 ASSESSMENT — ACTIVITIES OF DAILY LIVING (ADL): CURRENT_FUNCTION: NO ASSISTANCE NEEDED

## 2021-09-08 ASSESSMENT — MIFFLIN-ST. JEOR: SCORE: 1544.12

## 2021-09-08 NOTE — PATIENT INSTRUCTIONS
Patient Education   Personalized Prevention Plan  You are due for the preventive services outlined below.  Your care team is available to assist you in scheduling these services.  If you have already completed any of these items, please share that information with your care team to update in your medical record.  Health Maintenance Due   Topic Date Due     ANNUAL REVIEW OF HM ORDERS  Never done     FALL RISK ASSESSMENT  05/26/2021     Flu Vaccine (1) 09/01/2021

## 2021-09-08 NOTE — PROGRESS NOTES
SUBJECTIVE:   Angel Luis Boykin is a 81 year old male who presents for Preventive Visit.      Patient has been advised of split billing requirements and indicates understanding: Yes   Are you in the first 12 months of your Medicare coverage?  No    HPI    Patient had a annual wellness visit today.    He has had his Covid shots he did want to get his flu shot today and this was given    Patient has hypertension on amlodipine and losartan blood pressure on recheck 132/68    Patient is on atorvastatin for lipids    Takes vitamin D at 1000 units daily.    He has a history of prostate cancer has PSA levels and prostate exams with his urologist    He sees ophthalmology, Dr. Staton.    He is due for a follow-up colonoscopy I think it is appropriate based on his good health he did have a tubular adenoma in October 2016.  Referral was placed for the colonoscopy.    Patient has an advance care directive he will send a copy.    No risk for falls and no cognitive decline.    Patient continues on Xarelto he has a history of factor V Leyden he has had previous PE.    Patient also does get intermittent sciatica pain which improves nicely with a Medrol Dosepak I did give him a prescription to have on hand in case he needs it.  He will be traveling over to [place] this fall.    No additional concern or issue.      Do you feel safe in your environment? Yes    Have you ever done Advance Care Planning? (For example, a Health Directive, POLST, or a discussion with a medical provider or your loved ones about your wishes): Yes, advance care planning is on file.    No hearing concerns  Fall risk  Fallen 2 or more times in the past year?: No  Any fall with injury in the past year?: No  No concerns regarding falls      Cognitive Screening   1) Repeat 3 items (Leader, Season, Table)    2) Clock draw: NORMAL  3) 3 item recall: Recalls 3 objects  Results: 3 items recalled: COGNITIVE IMPAIRMENT LESS LIKELY  Normal significant memory  "issues  [facility] Copyright S Tara. Licensed by the author for use in [facility]; reprinted with permission (angus@.[email or web address]). All rights reserved.      Do you have sleep apnea, excessive snoring or daytime drowsiness?: no    Reviewed and updated as needed this visit by clinical staff  Tobacco  Allergies  Meds              Reviewed and updated as needed this visit by Provider  Tobacco               Social History     Tobacco Use     Smoking status: Former Smoker     Smokeless tobacco: Never Used   Substance Use Topics     Alcohol use: Yes     Alcohol/week: 2.0 standard drinks     Comment: Alcoholic Drinks/day: 2 glasses wine/day         Alcohol Use 9/8/2021   Prescreen: >3 drinks/day or >7 drinks/week? No         Current providers sharing in care for this patient include:   Care team includes    Primary physician Dr. Carter Mai  Urologist Dr. Richard Bhakta  Ophthalmology, Dr. She[facility]  Dermatology      The following health maintenance items are reviewed in Epic and correct as of today:  Health Maintenance Due   Topic Date Due     ANNUAL REVIEW OF HM ORDERS  Never done           Review of Systems  10 point review of systems positive as outlined above otherwise negative    OBJECTIVE:   /68 (BP Location: Left arm, Patient Position: Sitting, Cuff Size: Adult Regular)   Pulse 67   Temp 97.6  F (36.4  C) (Temporal)   Resp 18   Ht 1.81 m (5' 11.26\")   Wt 80.3 kg (177 lb)   BMI 24.51 kg/m   Estimated body mass index is 24.51 kg/m  as calculated from the following:    Height as of this encounter: 1.81 m (5' 11.26\").    Weight as of this encounter: 80.3 kg (177 lb).  Physical Exam  General appearance no acute distress    HEENT neck negative no adenopathy oropharynx is clear pupils react normally extract movements full    Lungs are clear no rales or rhonchi heart is regular S1-S2 no murmur    Heart rate in the 60s    Abdomen nontender no masses    No axillary or inguinal " adenopathy    Genitalia and rectal deferred patient will be seeing his urologist and getting a colonoscopy this fall    Lower extremities without edema he does have some slight fullness in through the left infrapatellar bursa not inflamed, not red    Lower extremities without edema pulses are normal.    Labs CMP lipid CBC vitamin D and TSH pending.        ASSESSMENT / PLAN:       ICD-10-CM    1. Encounter for Medicare annual wellness exam  Z00.00    2. Immunization counseling  Z71.89    3. Insomnia, unspecified type  G47.00 LORazepam (ATIVAN) 1 MG tablet   4. Acute right-sided low back pain with right-sided sciatica  M54.41 methylPREDNISolone (MEDROL DOSEPAK) 4 MG tablet therapy pack   5. Carcinoma Of The Prostate Gland  C61    6. Adenomatous polyp of colon, unspecified part of colon  D12.6 CBC with platelets     Adult Gastro Ref - Procedure Only   7. Hyperlipidemia, unspecified hyperlipidemia type  E78.5 Comprehensive metabolic panel (BMP + Alb, Alk Phos, ALT, AST, Total. Bili, TP)     Lipid Profile (Chol, Trig, HDL, LDL calc)   8. Hypertension  I10 Comprehensive metabolic panel (BMP + Alb, Alk Phos, ALT, AST, Total. Bili, TP)     TSH   9. Factor V Leiden mutation (H)  D68.51    10. History of pulmonary embolism  Z86.711    11. Vitamin D deficiency  E55.9 Vitamin D Deficiency   12. Other fatigue  R53.83 Testosterone Free and Total       Patient had a annual wellness exam things appear to be stable.    He does get intermittent sciatica we will have him use a Medrol Dosepak as needed    He is due for colonoscopy October 2021 referral placed he said a previous tubular adenoma.    He follows up with urology on his prostate cancer there is been no signs of recurrence.  He gets his PSA level checked there as well    Hyperlipidemia on atorvastatin 40 mg a day await lipid and LFT    Hypertension controlled on losartan and amlodipine.    Left infrapatellar bursal swelling just monitor, did discuss some support wrap might be  "helpful.    Factor V Leyden mutation with history of pulmonary embolism continue on Xarelto.    Immunization update with flu shot today otherwise up-to-date.    No additional concerns or issues    Patient be contacted with regards to the results of the labs, CMP lipid CBC vitamin D TSH.    Also did check testosterone level he feels like he does not have quite energy strength and stamina.  I did discuss we can check the level.  I would not prescribe testosterone with his history of prostate cancer, would leave that up to his urologist.  I will get back to him regarding results of the testosterone level as well        Estimated body mass index is 24.51 kg/m  as calculated from the following:    Height as of this encounter: 1.81 m (5' 11.26\").    Weight as of this encounter: 80.3 kg (177 lb).        He reports that he has quit smoking. He has never used smokeless tobacco.      Appropriate preventive services were discussed with this patient, including applicable screening as appropriate for cardiovascular disease, diabetes, osteopenia/osteoporosis, and glaucoma.  As appropriate for age/gender, discussed screening for colorectal cancer, prostate cancer, breast cancer, and cervical cancer. Checklist reviewing preventive services available has been given to the patient.    Reviewed patients plan of care and provided an AVS.   Counseling Resources:  ATP IV Guidelines  Pooled Cohorts Equation Calculator  Breast Cancer Risk Calculator  Breast Cancer: Medication to Reduce Risk  FRAX Risk Assessment  ICSI Preventive Guidelines  Dietary Guidelines for Americans, 2010  USDA's MyPlate  ASA Prophylaxis  Lung CA Screening    Carter Mai MD  [facility]    Identified Health Risks:  "

## 2021-09-09 LAB
DEPRECATED CALCIDIOL+CALCIFEROL SERPL-MC: 32 UG/L (ref 30–80)
SHBG SERPL-SCNC: 33 NMOL/L (ref 11–80)

## 2021-09-10 LAB
SHBG SERPL-SCNC: 33 NMOL/L (ref 11–80)
TESTOST FREE SERPL-MCNC: 4.44 NG/DL
TESTOST SERPL-MCNC: 231 NG/DL (ref 240–950)

## 2021-09-12 DIAGNOSIS — E78.5 HYPERLIPIDEMIA, UNSPECIFIED HYPERLIPIDEMIA TYPE: Primary | ICD-10-CM

## 2021-09-12 RX ORDER — ATORVASTATIN CALCIUM 80 MG/1
80 TABLET, FILM COATED ORAL DAILY
Qty: 90 TABLET | Refills: 1 | Status: SHIPPED | OUTPATIENT
Start: 2021-09-12 | End: 2022-03-15

## 2021-10-03 ENCOUNTER — TRANSFERRED RECORDS (OUTPATIENT)
Dept: HEALTH INFORMATION MANAGEMENT | Facility: CLINIC | Age: 81
End: 2021-10-03

## 2021-10-04 LAB
CHOLESTEROL (EXTERNAL): 189 MG/DL (ref 0–199)
CREATININE (EXTERNAL): 1.09 MG/DL (ref 0.73–1.18)
GFR ESTIMATED (EXTERNAL): >60 ML/MIN/1.73M2
GLUCOSE (EXTERNAL): 90 MG/DL (ref 70–100)
HDLC SERPL-MCNC: 40 MG/DL
LDL CHOLESTEROL (EXTERNAL): 100 MG/DL
NON HDL CHOLESTEROL (EXTERNAL): 149 MG/DL
POTASSIUM (EXTERNAL): 4.1 MMOL/L (ref 3.5–5.1)
TRIGLYCERIDES (EXTERNAL): 246 MG/DL
TSH SERPL-ACNC: 1.67 UIU/ML (ref 0.3–4.5)

## 2021-10-05 LAB — HBA1C MFR BLD: 5.6 %

## 2021-10-06 ENCOUNTER — TRANSCRIBE ORDERS (OUTPATIENT)
Dept: OTHER | Age: 81
End: 2021-10-06

## 2021-10-06 DIAGNOSIS — Z95.5 STENTED CORONARY ARTERY: Primary | ICD-10-CM

## 2021-10-13 RX ORDER — AMLODIPINE BESYLATE 5 MG/1
10 TABLET ORAL DAILY
COMMUNITY
Start: 2021-10-05 | End: 2021-10-25

## 2021-10-13 RX ORDER — CLOPIDOGREL BISULFATE 75 MG/1
75 TABLET ORAL DAILY
COMMUNITY
Start: 2021-10-06 | End: 2021-10-25

## 2021-10-13 RX ORDER — LORATADINE 10 MG/1
10 TABLET ORAL DAILY
COMMUNITY

## 2021-10-14 ENCOUNTER — TELEPHONE (OUTPATIENT)
Dept: FAMILY MEDICINE | Facility: CLINIC | Age: 81
End: 2021-10-14

## 2021-10-14 NOTE — TELEPHONE ENCOUNTER
Reason for Call:  Other     Detailed comments: mirian calling from [facility], she needs an order to hold the zarelto for 3 days prior to patients colonoscopy, his procedure date is November 11    Phone Number Patient can be reached at: Other phone number:  [phone]    Best Time: any    Can we leave a detailed message on this number? YES    Call taken on 10/14/2021 at 2:41 PM by Shelli Flaherty

## 2021-10-15 NOTE — TELEPHONE ENCOUNTER
"I will leave this for Dr. Mai - who knows this patient well - he will be back in office on October 25 (in time to make the decision about holding anticoagulation) - otherwise \"hold\" decision would need to be made by cardiology.    "

## 2021-10-21 NOTE — TELEPHONE ENCOUNTER
[facility] called to check on status of this request. It is not a beltran, will wait for Dr. Mai to return to office to make decide.

## 2021-10-25 ENCOUNTER — OFFICE VISIT (OUTPATIENT)
Dept: FAMILY MEDICINE | Facility: CLINIC | Age: 81
End: 2021-10-25
Payer: MEDICARE

## 2021-10-25 VITALS
SYSTOLIC BLOOD PRESSURE: 125 MMHG | WEIGHT: 180 LBS | OXYGEN SATURATION: 95 % | HEART RATE: 55 BPM | BODY MASS INDEX: 24.92 KG/M2 | DIASTOLIC BLOOD PRESSURE: 68 MMHG | RESPIRATION RATE: 18 BRPM | TEMPERATURE: 97.4 F

## 2021-10-25 DIAGNOSIS — Z86.711 HISTORY OF PULMONARY EMBOLISM: ICD-10-CM

## 2021-10-25 DIAGNOSIS — I21.4 NSTEMI (NON-ST ELEVATED MYOCARDIAL INFARCTION) (H): Primary | ICD-10-CM

## 2021-10-25 DIAGNOSIS — I10 BENIGN ESSENTIAL HYPERTENSION: ICD-10-CM

## 2021-10-25 DIAGNOSIS — D68.51 FACTOR V LEIDEN MUTATION (H): ICD-10-CM

## 2021-10-25 DIAGNOSIS — D12.6 ADENOMATOUS POLYP OF COLON, UNSPECIFIED PART OF COLON: ICD-10-CM

## 2021-10-25 PROCEDURE — 99214 OFFICE O/P EST MOD 30 MIN: CPT | Performed by: FAMILY MEDICINE

## 2021-10-25 RX ORDER — CLOPIDOGREL BISULFATE 75 MG/1
75 TABLET ORAL DAILY
Qty: 90 TABLET | Refills: 1 | Status: SHIPPED | OUTPATIENT
Start: 2021-10-25 | End: 2022-03-18

## 2021-10-25 RX ORDER — AMLODIPINE BESYLATE 10 MG/1
10 TABLET ORAL DAILY
Qty: 90 TABLET | Refills: 1 | Status: SHIPPED | OUTPATIENT
Start: 2021-10-25 | End: 2022-05-09

## 2021-10-25 NOTE — TELEPHONE ENCOUNTER
Please [facility]  Patient recently had a myocardial infarction and had a stent placed        He is now on Plavix and Xarelto    He can't stop these at this time    So cancel the colonoscopy for now    He will reschedule down the line

## 2021-10-25 NOTE — PROGRESS NOTES
Assessment & Plan        ICD-10-CM    1. NSTEMI (non-ST elevated myocardial infarction) (H)  I21.4 clopidogrel (PLAVIX) 75 MG tablet    stent RCA  10/2021   2. Factor V Leiden mutation (H)  D68.51    3. History of pulmonary embolism  Z86.711    4. Benign essential hypertension  I10 amLODIPine (NORVASC) 10 MG tablet   5. Adenomatous polyp of colon, unspecified part of colon  D12.6           Non-ST elevated MI status post stent of right coronary artery stable echocardiogram within normal limits    Continue on the Plavix 75 mg a day now off aspirin he continues on Xarelto 20 mg a day for his chronic PE.    Hypertension controlled on amlodipine and losartan    Now on atorvastatin 80 mg a day LDL dropped from 147 down to 100 consider adding Zetia or changing to Crestor, he will talk to his cardiologist    Borderline low testosterone, talk to his urologist regarding any testosterone replacement given the fact he had prostate cancer    History of adenomatous colon polyps hold off on colonoscopy at this point consider in  12 months.  He would need to be off both Xarelto and Plavix.  He is having no GI symptoms    Follow-up with me in 3 months      No follow-ups on file. for recheck.        Subjective:    This 81 year old male was seen today for follow-up.  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    He is on Plavix now in addition to Xarelto he is now stopping aspirin.    He is on chronic Xarelto for chronic PE    He did have a acute right lower lobe PE noted on chest CT.    He had a right middle lobe and right lower lobe PE back in 2015 when he is out in [facility].    In the recent hospital stay his amlodipine was increased from 5 to 10 mg a day he continues on losartan 10 mg a day.    Patient's LDL back in September was  147 his atorvastatin was increased from 40 to 80 mg at that time.    LDL in the hospital on 10/4/2021 was at 100    He will talk to his cardiologist in the next couple weeks when he follows up to see if he needs to add Zetia or Crestor otherwise we will recheck in about 3 months recheck lipid    Back in September his free testosterone level was in the low end of normal at 4.4 (4.1-23)    Total testosterone was low at 231, normal 240-950    Patient will touch base with his urologist,  in December when he has a follow-up there he has a history of prostate cancer in the past.    Regarding his heart he is doing well he is active again he went to cardiac rehab but he states that the cardiac rehab is not as active as what he had been doing prior to and since his non-ST elevated MI.    No additional concern or issue.  Review of Systems    10 point review of systems positive as outlined above otherwise negative    Patient was scheduled for follow-up colonoscopy has been 5 years since his last colonoscopy he had adenomatous polyps.    Because he had the MI and is now on Plavix and Xarelto.  He should not stop his anticoagulation at this time and he should not get colonoscopy.  Down the line might be appropriate to recheck in 6 to 12 months when he safe to go off the Plavix.    Current Outpatient Medications   Medication     amLODIPine (NORVASC) 10 MG tablet     clopidogrel (PLAVIX) 75 MG tablet     acetaminophen (TYLENOL) 500 MG tablet     atorvastatin (LIPITOR) 80 MG tablet     hydrOXYzine HCL (ATARAX) 25 MG tablet     loratadine (CLARITIN) 10 MG tablet     LORazepam (ATIVAN) 1 MG tablet     losartan (COZAAR) 100 MG tablet     multivitamin (MEN'S MULTI-VITAMIN) per tablet     omeprazole (PRILOSEC) 20 MG capsule     pilocarpine (SALAGEN) 5 MG tablet     sildenafil (REVATIO) 20 mg tablet     XARELTO ANTICOAGULANT 20 MG TABS tablet     No current facility-administered medications for this visit.        Objective    Vitals: /68 (BP Location: Right arm, Patient Position: Sitting, Cuff Size: Adult Regular)   Pulse 55   Temp 97.4  F (36.3  C) (Temporal)   Resp 18   Wt 81.6 kg (180 lb)   SpO2 95%   BMI 24.92 kg/m    BMI= Body mass index is 24.92 kg/m .     Physical Exam    General appearance no acute distress    Blood pressure looks good at 120/68 he will stay on the same amlodipine 10 mg a day as well as losartan    HEENT unremarkable neck nontender    Lungs clear throughout no rales or rhonchi heart regular S1-S2.    His right wrist where they did the angiogram is unremarkable no further bruising    No peripheral edema    Abdomen nontender        Carter Mai MD

## 2021-11-17 ENCOUNTER — MYC REFILL (OUTPATIENT)
Dept: FAMILY MEDICINE | Facility: CLINIC | Age: 81
End: 2021-11-17
Payer: MEDICARE

## 2021-11-17 DIAGNOSIS — I10 HYPERTENSION: ICD-10-CM

## 2021-11-18 RX ORDER — LOSARTAN POTASSIUM 100 MG/1
100 TABLET ORAL DAILY
Qty: 90 TABLET | Refills: 3 | Status: SHIPPED | OUTPATIENT
Start: 2021-11-18 | End: 2022-11-04

## 2021-11-18 NOTE — TELEPHONE ENCOUNTER
"Last Written Prescription Date:  05/14/2021  Last Fill Quantity: 90,  # refills: 1   Last office visit provider:  10/25/2021 with Dr LACHO Mai.    Requested Prescriptions   Pending Prescriptions Disp Refills     losartan (COZAAR) 100 MG tablet 90 tablet 1       Angiotensin-II Receptors Passed - 11/18/2021 10:38 AM        Passed - Last blood pressure under 140/90 in past 12 months     BP Readings from Last 3 Encounters:   10/25/21 125/68   09/08/21 132/68   11/24/20 (!) 140/70                 Passed - Recent (12 mo) or future (30 days) visit within the authorizing provider's specialty     Patient has had an office visit with the authorizing provider or a provider within the authorizing providers department within the previous 12 mos or has a future within next 30 days. See \"Patient Info\" tab in inbasket, or \"Choose Columns\" in Meds & Orders section of the refill encounter.              Passed - Medication is active on med list        Passed - Patient is age 18 or older        Passed - Normal serum creatinine on file in past 12 months     Recent Labs   Lab Test 09/08/21  0948   CR 1.12       Ok to refill medication if creatinine is low          Passed - Normal serum potassium on file in past 12 months     Recent Labs   Lab Test 09/08/21  0948   POTASSIUM 3.9                         Sheryl Taylor 11/18/21 4:35 PM  "

## 2021-12-14 ENCOUNTER — TELEPHONE (OUTPATIENT)
Dept: FAMILY MEDICINE | Facility: CLINIC | Age: 81
End: 2021-12-14

## 2021-12-14 ENCOUNTER — MYC MEDICAL ADVICE (OUTPATIENT)
Dept: FAMILY MEDICINE | Facility: CLINIC | Age: 81
End: 2021-12-14
Payer: MEDICARE

## 2021-12-14 DIAGNOSIS — G47.00 INSOMNIA, UNSPECIFIED TYPE: ICD-10-CM

## 2021-12-14 RX ORDER — LORAZEPAM 1 MG/1
TABLET ORAL
Qty: 90 TABLET | Refills: 0 | Status: SHIPPED | OUTPATIENT
Start: 2021-12-14 | End: 2022-03-16

## 2021-12-14 NOTE — TELEPHONE ENCOUNTER
"RN took INCOMING call from Amin, Pharmacist from [facility] on clarification of pt medication.    Pharmacist was made aware that pt taking Clopidogrel and Xarelto.    Per pt, he is supposed to take both XARELTO and Clopidrogrel for his recent heart attack.    Pharmacist calling to clarify if this is what pt should be taking.    RN did chart review and notice last 10/25/2021 OV Progress Note:    \"Continue on the Plavix 75 mg a day now off aspirin he continues on Xarelto 20 mg a day for his chronic PE.\"    RN relay information to Pharmacist. Pharmacist verbalizes understanding and has no further questions.    Routing to PCP to review if any changes to pt KATEY MontemayorN, RN   [facility]          "

## 2021-12-16 RX ORDER — LORAZEPAM 1 MG/1
TABLET ORAL
Qty: 90 TABLET | Refills: 0 | OUTPATIENT
Start: 2021-12-16

## 2021-12-30 ENCOUNTER — TRANSFERRED RECORDS (OUTPATIENT)
Dept: HEALTH INFORMATION MANAGEMENT | Facility: CLINIC | Age: 81
End: 2021-12-30
Payer: MEDICARE

## 2022-01-12 NOTE — TELEPHONE ENCOUNTER
Refill Approved    Rx renewed per Medication Renewal Policy. Medication was last renewed on 11/22/2018.  Last OV 10/14/2019.    Dolly He, [facility] Connection Triage/Med Refill 11/1/2019     Requested Prescriptions   Pending Prescriptions Disp Refills     amLODIPine (NORVASC) 5 MG tablet [Pharmacy Med Name: AMLODIPINE BESYLATE 5MG TABLETS] 90 tablet 0     Sig: TAKE 1 TABLET(5 MG) BY MOUTH DAILY       Calcium-Channel Blockers Protocol Passed - 11/1/2019  3:59 AM        Passed - PCP or prescribing provider visit in past 12 months or next 3 months     Last office visit with prescriber/PCP: 2/12/2018 Carter Mai MD OR same dept: 8/22/2019 Justen Monahan MD OR same specialty: 8/22/2019 Justen Monahan MD  Last physical: 10/14/2019 Last MTM visit: Visit date not found   Next visit within 3 mo: Visit date not found  Next physical within 3 mo: Visit date not found  Prescriber OR PCP: Carter Mai MD  Last diagnosis associated with med order: 1. Hypertension  - amLODIPine (NORVASC) 5 MG tablet [Pharmacy Med Name: AMLODIPINE BESYLATE 5MG TABLETS]; TAKE 1 TABLET(5 MG) BY MOUTH DAILY  Dispense: 90 tablet; Refill: 0    If protocol passes may refill for 12 months if within 3 months of last provider visit (or a total of 15 months).             Passed - Blood pressure filed in past 12 months     BP Readings from Last 1 Encounters:   10/14/19 128/72                         
Yes

## 2022-01-16 DIAGNOSIS — Z86.711 HISTORY OF PULMONARY EMBOLISM: ICD-10-CM

## 2022-01-18 RX ORDER — RIVAROXABAN 20 MG/1
TABLET, FILM COATED ORAL
Qty: 90 TABLET | Refills: 0 | Status: SHIPPED | OUTPATIENT
Start: 2022-01-18 | End: 2022-04-19

## 2022-01-18 NOTE — TELEPHONE ENCOUNTER
Routing refill request to provider for review/approval because:  Labs not current:  Creatinine Clearance.  Anticoagulant. Needs PCP approval.   Break in medication.    Last Written Prescription Date:  07/15/2021  Last Fill Quantity: 90,  # refills: 0   Last office visit provider:  10/25/2021 with Dr Mai.    Requested Prescriptions   Pending Prescriptions Disp Refills     XARELTO ANTICOAGULANT 20 MG TABS tablet [Pharmacy Med Name: XARELTO 20MG TABLETS] 90 tablet 0     Sig: TAKE 1 TABLET BY MOUTH DAILY WITH SUPPER       Direct Oral Anticoagulant Agents Failed - 1/16/2022 12:08 PM        Failed - Creatinine Clearance greater than 50 ml/min on file in past 3 mos     No lab results found.          Failed - Serum creatinine less than or equal to 1.4 on file in past 3 mos     Recent Labs   Lab Test 09/08/21  0948   CR 1.12       Ok to refill medication if creatinine is low          Passed - Normal Platelets on file in past 12 months     Recent Labs   Lab Test 09/08/21  0948                  Passed - Medication is active on med list        Passed - Patient is 18 years of age or older        Passed - Recent (6 mo) or future (30 days) visit within the authorizing provider's specialty             Sheryl Taylor 01/18/22 4:08 PM

## 2022-02-07 ENCOUNTER — MYC REFILL (OUTPATIENT)
Dept: FAMILY MEDICINE | Facility: CLINIC | Age: 82
End: 2022-02-07
Payer: MEDICARE

## 2022-02-07 DIAGNOSIS — F41.1 ANXIETY STATE: ICD-10-CM

## 2022-02-09 RX ORDER — HYDROXYZINE HYDROCHLORIDE 25 MG/1
25 TABLET, FILM COATED ORAL AT BEDTIME
Qty: 90 TABLET | Refills: 2 | Status: SHIPPED | OUTPATIENT
Start: 2022-02-09 | End: 2022-11-04

## 2022-02-09 NOTE — TELEPHONE ENCOUNTER
"Last Written Prescription Date:  5/14/21  Last Fill Quantity: 90,  # refills: 2  Last office visit provider: 10/25/21      Requested Prescriptions   Pending Prescriptions Disp Refills     hydrOXYzine (ATARAX) 25 MG tablet 90 tablet 2       Antihistamines Protocol Passed - 2/7/2022 12:54 PM        Passed - Recent (12 mo) or future (30 days) visit within the authorizing provider's specialty     Patient has had an office visit with the authorizing provider or a provider within the authorizing providers department within the previous 12 mos or has a future within next 30 days. See \"Patient Info\" tab in inbasket, or \"Choose Columns\" in Meds & Orders section of the refill encounter.              Passed - Patient is age 3 or older     Apply age and/or weight-based dosing for peds patients age 3 and older.    Forward request to provider for patients under the age of 3.          Passed - Medication is active on med list             Monique Cano RN 02/09/22 8:57 AM  "

## 2022-03-15 DIAGNOSIS — G47.00 INSOMNIA, UNSPECIFIED TYPE: ICD-10-CM

## 2022-03-15 DIAGNOSIS — E78.5 HYPERLIPIDEMIA, UNSPECIFIED HYPERLIPIDEMIA TYPE: ICD-10-CM

## 2022-03-15 DIAGNOSIS — K20.90 ESOPHAGITIS: ICD-10-CM

## 2022-03-15 RX ORDER — ATORVASTATIN CALCIUM 80 MG/1
TABLET, FILM COATED ORAL
Qty: 90 TABLET | Refills: 1 | Status: SHIPPED | OUTPATIENT
Start: 2022-03-15 | End: 2022-06-15

## 2022-03-16 RX ORDER — LORAZEPAM 1 MG/1
TABLET ORAL
Qty: 90 TABLET | Refills: 0 | Status: SHIPPED | OUTPATIENT
Start: 2022-03-16 | End: 2022-06-14

## 2022-03-16 NOTE — TELEPHONE ENCOUNTER
"Routing refill request to provider for review/approval because:  Drug not on the [facility] refill protocol - controlled substance request    Last Written Prescription Date:  12/14/2021  Last Fill Quantity: 90,  # refills: 0   Last office visit provider:  10/25/2021     Requested Prescriptions   Pending Prescriptions Disp Refills     LORazepam (ATIVAN) 1 MG tablet [Pharmacy Med Name: LORAZEPAM 1MG TABLETS] 90 tablet      Sig: TAKE 1 TABLET BY MOUTH AT BEDTIME       There is no refill protocol information for this order     Routing refill request to provider for review/approval because:  Failed protocol PCP please review for refill.     Last Written Prescription Date:  6/2/2021  Last Fill Quantity: 90,  # refills: 2        omeprazole (PRILOSEC) 20 MG DR capsule [Pharmacy Med Name: OMEPRAZOLE 20MG CAPSULES] 90 capsule 2     Sig: TAKE 1 CAPSULE BY MOUTH DAILY       PPI Protocol Failed - 3/15/2022 11:35 AM        Failed - Not on Clopidogrel (unless Pantoprazole ordered)        Passed - No diagnosis of osteoporosis on record        Passed - Recent (12 mo) or future (30 days) visit within the authorizing provider's specialty     Patient has had an office visit with the authorizing provider or a provider within the authorizing providers department within the previous 12 mos or has a future within next 30 days. See \"Patient Info\" tab in inbasket, or \"Choose Columns\" in Meds & Orders section of the refill encounter.              Passed - Medication is active on med list        Passed - Patient is age 18 or older        Last Written Prescription Date:  9/12/2021  Last Fill Quantity: 90,  # refills: 1        atorvastatin (LIPITOR) 80 MG tablet [Pharmacy Med Name: ATORVASTATIN 80MG TABLETS] 90 tablet 1     Sig: TAKE 1 TABLET(80 MG) BY MOUTH DAILY       Statins Protocol Passed - 3/15/2022 11:35 AM        Passed - LDL on file in past 12 months     Recent Labs   Lab Test 09/08/21  0948   *             Passed - No abnormal creatine " "kinase in past 12 months     No lab results found.             Passed - Recent (12 mo) or future (30 days) visit within the authorizing provider's specialty     Patient has had an office visit with the authorizing provider or a provider within the authorizing providers department within the previous 12 mos or has a future within next 30 days. See \"Patient Info\" tab in inbasket, or \"Choose Columns\" in Meds & Orders section of the refill encounter.              Passed - Medication is active on med list        Passed - Patient is age 18 or older             Faiza Pereira RN 03/15/22 9:42 PM  "

## 2022-03-18 DIAGNOSIS — I21.4 NSTEMI (NON-ST ELEVATED MYOCARDIAL INFARCTION) (H): ICD-10-CM

## 2022-03-18 RX ORDER — CLOPIDOGREL BISULFATE 75 MG/1
TABLET ORAL
Qty: 90 TABLET | Refills: 1 | Status: SHIPPED | OUTPATIENT
Start: 2022-03-18 | End: 2023-03-22

## 2022-03-18 NOTE — TELEPHONE ENCOUNTER
"Routing refill request to provider for review/approval because:  Failed - No active PPI on record unless is Protonix    Last Written Prescription Date:  10/25/2021  Last Fill Quantity: 90,  # refills: 1   Last office visit provider:  9/8/2021     Requested Prescriptions   Pending Prescriptions Disp Refills     clopidogrel (PLAVIX) 75 MG tablet [Pharmacy Med Name: CLOPIDOGREL 75MG TABLETS] 90 tablet 1     Sig: TAKE 1 TABLET(75 MG) BY MOUTH DAILY       Plavix Failed - 3/18/2022  8:06 AM        Failed - No active PPI on record unless is Protonix        Passed - Normal HGB on file in past 12 months     Recent Labs   Lab Test 09/08/21  0948   HGB 15.1               Passed - Normal Platelets on file in past 12 months     Recent Labs   Lab Test 09/08/21  0948                  Passed - Recent (12 mo) or future (30 days) visit within the authorizing provider's specialty     Patient has had an office visit with the authorizing provider or a provider within the authorizing providers department within the previous 12 mos or has a future within next 30 days. See \"Patient Info\" tab in inbasket, or \"Choose Columns\" in Meds & Orders section of the refill encounter.              Passed - Medication is active on med list        Passed - Patient is age 18 or older             Marielle Rajan RN 03/18/22 1:33 PM  "

## 2022-04-17 DIAGNOSIS — Z86.711 HISTORY OF PULMONARY EMBOLISM: ICD-10-CM

## 2022-04-18 DIAGNOSIS — R68.2 DRY MOUTH: ICD-10-CM

## 2022-04-18 RX ORDER — PILOCARPINE HYDROCHLORIDE 5 MG/1
5 TABLET, FILM COATED ORAL 4 TIMES DAILY
Qty: 360 TABLET | Refills: 3 | Status: CANCELLED | OUTPATIENT
Start: 2022-04-18

## 2022-04-19 RX ORDER — RIVAROXABAN 20 MG/1
TABLET, FILM COATED ORAL
Qty: 90 TABLET | Refills: 0 | Status: SHIPPED | OUTPATIENT
Start: 2022-04-19 | End: 2022-07-11

## 2022-04-19 NOTE — TELEPHONE ENCOUNTER
Routing refill request to provider for review/approval because:  Labs not current:  Multiple  Anticoagulation protocol - route to provider.    Last Written Prescription Date:  1/18/22  Last Fill Quantity: 90,  # refills: 0   Last office visit provider:  10/25/21     Requested Prescriptions   Pending Prescriptions Disp Refills     XARELTO ANTICOAGULANT 20 MG TABS tablet [Pharmacy Med Name: XARELTO 20MG TABLETS] 90 tablet 0     Sig: TAKE 1 TABLET BY MOUTH DAILY WITH SUPPER       Direct Oral Anticoagulant Agents Failed - 4/19/2022  9:39 AM        Failed - Creatinine Clearance greater than 50 ml/min on file in past 3 mos     No lab results found.          Failed - Serum creatinine less than or equal to 1.4 on file in past 3 mos     Recent Labs   Lab Test 09/08/21  0948   CR 1.12       Ok to refill medication if creatinine is low          Passed - Normal Platelets on file in past 12 months     Recent Labs   Lab Test 09/08/21  0948                  Passed - Medication is active on med list        Passed - Patient is 18 years of age or older        Passed - Recent (6 mo) or future (30 days) visit within the authorizing provider's specialty             Thomas Clemons RN 04/19/22 9:39 AM

## 2022-04-20 RX ORDER — PILOCARPINE HYDROCHLORIDE 5 MG/1
5 TABLET, FILM COATED ORAL 4 TIMES DAILY
Qty: 360 TABLET | Refills: 3 | Status: SHIPPED | OUTPATIENT
Start: 2022-04-20 | End: 2023-06-08

## 2022-04-20 NOTE — TELEPHONE ENCOUNTER
Routing refill request to provider for review/approval because:  Drug not on the [facility] [place] protocol     Last Written Prescription Date:  2/18/21  Last Fill Quantity: 360,  # refills: 3   Last office visit provider:  10/25/21     Requested Prescriptions   Pending Prescriptions Disp Refills     pilocarpine (SALAGEN) 5 MG tablet 360 tablet 3     Sig: Take 1 tablet (5 mg) by mouth 4 times daily       There is no refill protocol information for this order          Thomas Clemons RN 04/20/22 1:00 PM

## 2022-05-08 DIAGNOSIS — I10 BENIGN ESSENTIAL HYPERTENSION: ICD-10-CM

## 2022-05-10 RX ORDER — AMLODIPINE BESYLATE 10 MG/1
TABLET ORAL
Qty: 90 TABLET | Refills: 1 | OUTPATIENT
Start: 2022-05-10

## 2022-05-25 ENCOUNTER — TRANSFERRED RECORDS (OUTPATIENT)
Dept: HEALTH INFORMATION MANAGEMENT | Facility: CLINIC | Age: 82
End: 2022-05-25
Payer: MEDICARE

## 2022-06-15 DIAGNOSIS — E78.5 HYPERLIPIDEMIA, UNSPECIFIED HYPERLIPIDEMIA TYPE: ICD-10-CM

## 2022-06-15 RX ORDER — ATORVASTATIN CALCIUM 80 MG/1
TABLET, FILM COATED ORAL
Qty: 90 TABLET | Refills: 0 | Status: SHIPPED | OUTPATIENT
Start: 2022-06-15 | End: 2022-09-20

## 2022-06-16 NOTE — TELEPHONE ENCOUNTER
"Last Written Prescription Date: 3/15/2022  Last Fill Quantity: 90,  # refills: 1  Last office visit provider:  10/25/2021 with PCP Dr LACHO Mai     Requested Prescriptions   Pending Prescriptions Disp Refills     atorvastatin (LIPITOR) 80 MG tablet [Pharmacy Med Name: ATORVASTATIN 80MG TABLETS] 90 tablet 1     Sig: TAKE 1 TABLET(80 MG) BY MOUTH DAILY       Statins Protocol Passed - 6/15/2022  8:06 AM        Passed - LDL on file in past 12 months     Recent Labs   Lab Test 09/08/21  0948   *             Passed - No abnormal creatine kinase in past 12 months     No lab results found.             Passed - Recent (12 mo) or future (30 days) visit within the authorizing provider's specialty     Patient has had an office visit with the authorizing provider or a provider within the authorizing providers department within the previous 12 mos or has a future within next 30 days. See \"Patient Info\" tab in inbasket, or \"Choose Columns\" in Meds & Orders section of the refill encounter.              Passed - Medication is active on med list        Passed - Patient is age 18 or older             Cece Craven RN 06/15/22 9:31 PM  "

## 2022-07-10 DIAGNOSIS — Z86.711 HISTORY OF PULMONARY EMBOLISM: ICD-10-CM

## 2022-07-10 NOTE — TELEPHONE ENCOUNTER
Routing refill request to provider for review/approval because:  Labs not current:  Cr cl    Last Written Prescription Date:  4/19/22  Last Fill Quantity: 90,  # refills: 0   Last office visit provider:  10/25/21     Requested Prescriptions   Pending Prescriptions Disp Refills     XARELTO ANTICOAGULANT 20 MG TABS tablet [Pharmacy Med Name: XARELTO 20MG TABLETS] 90 tablet 0     Sig: TAKE 1 TABLET BY MOUTH DAILY WITH SUPPER       Direct Oral Anticoagulant Agents Failed - 7/10/2022  8:31 AM        Failed - Creatinine Clearance greater than 50 ml/min on file in past 3 mos     No lab results found.          Failed - Serum creatinine less than or equal to 1.4 on file in past 3 mos     Recent Labs   Lab Test 09/08/21  0948   CR 1.12       Ok to refill medication if creatinine is low          Failed - Recent (6 mo) or future (30 days) visit within the authorizing provider's specialty        Passed - Normal Platelets on file in past 12 months     Recent Labs   Lab Test 09/08/21  0948                  Passed - Medication is active on med list        Passed - Patient is 18 years of age or older             Darlene Gallo, RN 07/10/22 2:42 PM

## 2022-07-11 RX ORDER — RIVAROXABAN 20 MG/1
TABLET, FILM COATED ORAL
Qty: 90 TABLET | Refills: 0 | Status: SHIPPED | OUTPATIENT
Start: 2022-07-11 | End: 2022-10-07

## 2022-09-16 DIAGNOSIS — G47.00 INSOMNIA, UNSPECIFIED TYPE: ICD-10-CM

## 2022-09-16 RX ORDER — LORAZEPAM 1 MG/1
TABLET ORAL
Qty: 90 TABLET | Refills: 0 | Status: SHIPPED | OUTPATIENT
Start: 2022-09-16 | End: 2022-12-19

## 2022-09-16 ASSESSMENT — ENCOUNTER SYMPTOMS
COUGH: 0
HEADACHES: 0
NAUSEA: 0
ARTHRALGIAS: 0
ABDOMINAL PAIN: 0
HEMATURIA: 0
SHORTNESS OF BREATH: 0
WEAKNESS: 0
JOINT SWELLING: 0
HEARTBURN: 0
SORE THROAT: 0
DIZZINESS: 0
EYE PAIN: 0
FEVER: 0
PALPITATIONS: 0
MYALGIAS: 0
PARESTHESIAS: 1
CHILLS: 0
CONSTIPATION: 0
DYSURIA: 0
HEMATOCHEZIA: 0
FREQUENCY: 0
NERVOUS/ANXIOUS: 0
DIARRHEA: 0

## 2022-09-16 ASSESSMENT — ACTIVITIES OF DAILY LIVING (ADL): CURRENT_FUNCTION: NO ASSISTANCE NEEDED

## 2022-09-18 DIAGNOSIS — E78.5 HYPERLIPIDEMIA, UNSPECIFIED HYPERLIPIDEMIA TYPE: ICD-10-CM

## 2022-09-19 NOTE — TELEPHONE ENCOUNTER
"Routing refill request to provider for review/approval because:  Labs not current:  LDL    Former patient of Carter Mai & has not established care with another provider.  Please assign refill request to covering provider per Clinic standard process.    Last Written Prescription Date:  6/15/22  Last Fill Quantity: 90,  # refills: 0   Last office visit provider:  10/25/21     Requested Prescriptions   Pending Prescriptions Disp Refills     atorvastatin (LIPITOR) 80 MG tablet [Pharmacy Med Name: ATORVASTATIN 80MG TABLETS] 90 tablet 0     Sig: TAKE 1 TABLET(80 MG) BY MOUTH DAILY       Statins Protocol Failed - 9/18/2022  8:06 AM        Failed - LDL on file in past 12 months     Recent Labs   Lab Test 09/08/21  0948   *             Passed - No abnormal creatine kinase in past 12 months     No lab results found.             Passed - Recent (12 mo) or future (30 days) visit within the authorizing provider's specialty     Patient has had an office visit with the authorizing provider or a provider within the authorizing providers department within the previous 12 mos or has a future within next 30 days. See \"Patient Info\" tab in inbasket, or \"Choose Columns\" in Meds & Orders section of the refill encounter.              Passed - Medication is active on med list        Passed - Patient is age 18 or older             Shelia Giron RN 09/19/22 2:46 PM  "

## 2022-09-20 ENCOUNTER — OFFICE VISIT (OUTPATIENT)
Dept: FAMILY MEDICINE | Facility: CLINIC | Age: 82
End: 2022-09-20
Payer: MEDICARE

## 2022-09-20 VITALS
HEIGHT: 70 IN | HEART RATE: 62 BPM | SYSTOLIC BLOOD PRESSURE: 118 MMHG | WEIGHT: 175 LBS | DIASTOLIC BLOOD PRESSURE: 67 MMHG | RESPIRATION RATE: 16 BRPM | BODY MASS INDEX: 25.05 KG/M2

## 2022-09-20 DIAGNOSIS — D68.51 FACTOR V LEIDEN MUTATION (H): ICD-10-CM

## 2022-09-20 DIAGNOSIS — Z00.00 HEALTHCARE MAINTENANCE: ICD-10-CM

## 2022-09-20 DIAGNOSIS — Z00.00 ENCOUNTER FOR MEDICARE ANNUAL WELLNESS EXAM: ICD-10-CM

## 2022-09-20 DIAGNOSIS — Z86.711 HISTORY OF PULMONARY EMBOLISM: ICD-10-CM

## 2022-09-20 DIAGNOSIS — G62.9 PERIPHERAL POLYNEUROPATHY: ICD-10-CM

## 2022-09-20 DIAGNOSIS — M54.41 ACUTE RIGHT-SIDED LOW BACK PAIN WITH RIGHT-SIDED SCIATICA: ICD-10-CM

## 2022-09-20 DIAGNOSIS — C61 MALIGNANT NEOPLASM OF PROSTATE (H): ICD-10-CM

## 2022-09-20 DIAGNOSIS — I26.99 PULMONARY EMBOLISM, OTHER, UNSPECIFIED CHRONICITY, UNSPECIFIED WHETHER ACUTE COR PULMONALE PRESENT (H): ICD-10-CM

## 2022-09-20 DIAGNOSIS — F41.1 ANXIETY STATE: ICD-10-CM

## 2022-09-20 DIAGNOSIS — I21.4 NSTEMI (NON-ST ELEVATED MYOCARDIAL INFARCTION) (H): Primary | ICD-10-CM

## 2022-09-20 DIAGNOSIS — C61 PROSTATE CANCER (H): ICD-10-CM

## 2022-09-20 DIAGNOSIS — R60.0 LOCALIZED EDEMA: ICD-10-CM

## 2022-09-20 LAB
ALT SERPL W P-5'-P-CCNC: 21 U/L (ref 10–50)
ANION GAP SERPL CALCULATED.3IONS-SCNC: 9 MMOL/L (ref 7–15)
BUN SERPL-MCNC: 22.5 MG/DL (ref 8–23)
CALCIUM SERPL-MCNC: 9 MG/DL (ref 8.8–10.2)
CHLORIDE SERPL-SCNC: 105 MMOL/L (ref 98–107)
CHOLEST SERPL-MCNC: 137 MG/DL
CREAT SERPL-MCNC: 1.16 MG/DL (ref 0.67–1.17)
DEPRECATED HCO3 PLAS-SCNC: 26 MMOL/L (ref 22–29)
ERYTHROCYTE [DISTWIDTH] IN BLOOD BY AUTOMATED COUNT: 14.6 % (ref 10–15)
GFR SERPL CREATININE-BSD FRML MDRD: 64 ML/MIN/1.73M2
GLUCOSE SERPL-MCNC: 87 MG/DL (ref 70–99)
HCT VFR BLD AUTO: 41.9 % (ref 40–53)
HDLC SERPL-MCNC: 56 MG/DL
HGB BLD-MCNC: 13.7 G/DL (ref 13.3–17.7)
LDLC SERPL CALC-MCNC: 49 MG/DL
MCH RBC QN AUTO: 30.9 PG (ref 26.5–33)
MCHC RBC AUTO-ENTMCNC: 32.7 G/DL (ref 31.5–36.5)
MCV RBC AUTO: 95 FL (ref 78–100)
NONHDLC SERPL-MCNC: 81 MG/DL
PLATELET # BLD AUTO: 152 10E3/UL (ref 150–450)
POTASSIUM SERPL-SCNC: 4.8 MMOL/L (ref 3.4–5.3)
PSA SERPL-MCNC: <0.01 NG/ML
RBC # BLD AUTO: 4.43 10E6/UL (ref 4.4–5.9)
SODIUM SERPL-SCNC: 140 MMOL/L (ref 136–145)
TRIGL SERPL-MCNC: 160 MG/DL
TSH SERPL DL<=0.005 MIU/L-ACNC: 1.72 UIU/ML (ref 0.3–4.2)
VIT B12 SERPL-MCNC: 800 PG/ML (ref 232–1245)
WBC # BLD AUTO: 6.9 10E3/UL (ref 4–11)

## 2022-09-20 PROCEDURE — 84460 ALANINE AMINO (ALT) (SGPT): CPT | Performed by: FAMILY MEDICINE

## 2022-09-20 PROCEDURE — 90471 IMMUNIZATION ADMIN: CPT | Performed by: FAMILY MEDICINE

## 2022-09-20 PROCEDURE — 99214 OFFICE O/P EST MOD 30 MIN: CPT | Mod: 25 | Performed by: FAMILY MEDICINE

## 2022-09-20 PROCEDURE — 82607 VITAMIN B-12: CPT | Performed by: FAMILY MEDICINE

## 2022-09-20 PROCEDURE — 80048 BASIC METABOLIC PNL TOTAL CA: CPT | Performed by: FAMILY MEDICINE

## 2022-09-20 PROCEDURE — 0124A COVID-19,PF,PFIZER BOOSTER BIVALENT: CPT | Performed by: FAMILY MEDICINE

## 2022-09-20 PROCEDURE — 84443 ASSAY THYROID STIM HORMONE: CPT | Performed by: FAMILY MEDICINE

## 2022-09-20 PROCEDURE — 80061 LIPID PANEL: CPT | Performed by: FAMILY MEDICINE

## 2022-09-20 PROCEDURE — 36415 COLL VENOUS BLD VENIPUNCTURE: CPT | Performed by: FAMILY MEDICINE

## 2022-09-20 PROCEDURE — G0439 PPPS, SUBSEQ VISIT: HCPCS | Performed by: FAMILY MEDICINE

## 2022-09-20 PROCEDURE — 85027 COMPLETE CBC AUTOMATED: CPT | Performed by: FAMILY MEDICINE

## 2022-09-20 PROCEDURE — 82746 ASSAY OF FOLIC ACID SERUM: CPT | Performed by: FAMILY MEDICINE

## 2022-09-20 PROCEDURE — 90715 TDAP VACCINE 7 YRS/> IM: CPT | Performed by: FAMILY MEDICINE

## 2022-09-20 PROCEDURE — 84153 ASSAY OF PSA TOTAL: CPT | Performed by: FAMILY MEDICINE

## 2022-09-20 PROCEDURE — 91312 COVID-19,PF,PFIZER BOOSTER BIVALENT: CPT | Performed by: FAMILY MEDICINE

## 2022-09-20 RX ORDER — METHYLPREDNISOLONE 4 MG
TABLET, DOSE PACK ORAL
Qty: 21 TABLET | Refills: 1 | Status: SHIPPED | OUTPATIENT
Start: 2022-09-20

## 2022-09-20 RX ORDER — ATORVASTATIN CALCIUM 80 MG/1
TABLET, FILM COATED ORAL
Qty: 90 TABLET | Refills: 0 | Status: SHIPPED | OUTPATIENT
Start: 2022-09-20 | End: 2023-03-20

## 2022-09-20 ASSESSMENT — ENCOUNTER SYMPTOMS
JOINT SWELLING: 0
HEARTBURN: 0
NAUSEA: 0
SHORTNESS OF BREATH: 0
DIARRHEA: 0
FEVER: 0
SORE THROAT: 0
CONSTIPATION: 0
FREQUENCY: 0
HEADACHES: 0
NERVOUS/ANXIOUS: 0
PARESTHESIAS: 1
PALPITATIONS: 0
ABDOMINAL PAIN: 0
HEMATOCHEZIA: 0
CHILLS: 0
COUGH: 0
EYE PAIN: 0
ARTHRALGIAS: 0
HEMATURIA: 0
DYSURIA: 0
DIZZINESS: 0
MYALGIAS: 0
WEAKNESS: 0

## 2022-09-20 ASSESSMENT — ACTIVITIES OF DAILY LIVING (ADL): CURRENT_FUNCTION: NO ASSISTANCE NEEDED

## 2022-09-20 NOTE — ASSESSMENT & PLAN NOTE
[facility],  Secondary prevention with antiplatelet therapy, blood pressure controlled, atorvastatin.  Having some skin aching, there is a chance this is related to high-dose atorvastatin and suggested he cut back to 40 mg temporarily

## 2022-09-20 NOTE — ASSESSMENT & PLAN NOTE
Healthcare maintenance assessment  Immunization-wishes to defer flu shot till later in the year, COVID 5 Valent given  Cancer screening-due for follow-up on colon polyps.  Discussed whether or not he should proceed with this, we will plan on it but after he has had follow-up with cardiology to determine whether he should stay on clopidogrel  Vascular-secondary prevention  Other-patient indicates that he has advanced directives and that it is on file

## 2022-09-20 NOTE — PROGRESS NOTES
"SUBJECTIVE:   Nicolas is a 82 year old who presents for Preventive Visit.      Patient has been advised of split billing requirements and indicates understanding: Yes  Are you in the first 12 months of your Medicare coverage?  No    Healthy Habits:     In general, how would you rate your overall health?  Good    Frequency of exercise:  6-7 days/week    Duration of exercise:  15-30 minutes    Do you usually eat at least 4 servings of fruit and vegetables a day, include whole grains    & fiber and avoid regularly eating high fat or \"junk\" foods?  Yes    Taking medications regularly:  Yes    Medication side effects:  Not applicable    Ability to successfully perform activities of daily living:  No assistance needed    Home Safety:  No safety concerns identified    Hearing Impairment:  No hearing concerns    In the past 6 months, have you been bothered by leaking of urine? Yes    In general, how would you rate your overall mental or emotional health?  Excellent      PHQ-2 Total Score: 0    Additional concerns today:  No    Do you feel safe in your environment? Yes    Have you ever done Advance Care Planning? (For example, a Health Directive, POLST, or a discussion with a medical provider or your loved ones about your wishes): Yes, patient reports that advanced directives dFall risk  Fallen 2 or more times in the past year?: No  Any fall with injury in the past year?: No  click delete button to remove this line now  Cognitive Screening   1) Repeat 3 items (Leader, Season, Table)    2) Clock draw: NORMAL  3) 3 item recall: Recalls 2 objects   Results: NORMAL clock, 1-2 items recalled: COGNITIVE IMPAIRMENT LESS LIKELY    Mini-CogTM Copyright GEETA Pacheco. Licensed by the author for use in [facility]; reprinted with permission (angus@.[facility]). All rights reserved.      Do you have sleep apnea, excessive snoring or daytime drowsiness?: no    Reviewed and updated as needed this visit by clinical staff   Tobacco  " Allergies  Meds                Reviewed and updated as needed this visit by Provider                   Social History     Tobacco Use     Smoking status: Former Smoker     Smokeless tobacco: Never Used   Substance Use Topics     Alcohol use: Yes     Alcohol/week: 2.0 standard drinks     Comment: Alcoholic Drinks/day: 2 glasses wine/day         Alcohol Use 9/16/2022   Prescreen: >3 drinks/day or >7 drinks/week? No     Current providers sharing in care for this patient include:   Patient Care Team:  Carter Mai MD as PCP - General (Family Practice)  Angel Luis Rivas MD as MD (Hematology & Oncology)  Carter Mai MD as Assigned PCP    The following health maintenance items are reviewed in Epic and correct as of today:  Health Maintenance   Topic Date Due     COVID-19 Vaccine (5 - Booster for Pfizer series) 06/15/2022     INFLUENZA VACCINE (1) 09/01/2022     MEDICARE ANNUAL WELLNESS VISIT  09/08/2022     ANNUAL REVIEW OF HM ORDERS  09/20/2023     FALL RISK ASSESSMENT  09/20/2023     ADVANCE CARE PLANNING  09/09/2026     LIPID  10/04/2026     DTAP/TDAP/TD IMMUNIZATION (4 - Td or Tdap) 10/28/2030     PHQ-2 (once per calendar year)  Completed     Pneumococcal Vaccine: 65+ Years  Completed     ZOSTER IMMUNIZATION  Completed     IPV IMMUNIZATION  Aged Out     MENINGITIS IMMUNIZATION  Aged Out     HEPATITIS B IMMUNIZATION  Aged Out           Review of Systems   Constitutional: Negative for chills and fever.   HENT: Positive for hearing loss. Negative for congestion, ear pain and sore throat.    Eyes: Negative for pain and visual disturbance.   Respiratory: Negative for cough and shortness of breath.    Cardiovascular: Negative for chest pain, palpitations and peripheral edema.   Gastrointestinal: Negative for abdominal pain, constipation, diarrhea, heartburn, hematochezia and nausea.   Genitourinary: Positive for impotence. Negative for dysuria, frequency, genital sores, hematuria, penile discharge and urgency.  "  Musculoskeletal: Negative for arthralgias, joint swelling and myalgias.   Skin: Negative for rash.   Neurological: Positive for paresthesias. Negative for dizziness, weakness and headaches.   Psychiatric/Behavioral: Negative for mood changes. The patient is not nervous/anxious.        OBJECTIVE:   /67 (BP Location: Left arm, Patient Position: Sitting, Cuff Size: Adult Large)   Pulse 62   Resp 16   Ht 1.77 m (5' 9.69\")   Wt 79.4 kg (175 lb)   BMI 25.34 kg/m   Estimated body mass index is 25.34 kg/m  as calculated from the following:    Height as of this encounter: 1.77 m (5' 9.69\").    Weight as of this encounter: 79.4 kg (175 lb).  Physical Exam  Gen- alert, oriented/ appropriately responsive  HEENT- normal cephalic, atraumatic.   Chest- Normal inspiration and expiration.    Clear to ascultation.    No chest wall deformity or scar.  CV- Heart regular rate and rhythm  normal tones, no murmurs   No gallops or rubs.  Ext- appear well perfused, no edema  Skin- warm and dry,   no visualized rash    ASSESSMENT / PLAN:     Problem List Items Addressed This Visit        Medium    Pulmonary embolus (H)     On Xarelto-long-term  Patient does have increased risk of bleeding by virtue of now using clopidogrel for myocardial infarction about 1 year ago  No bleeding problems however  Probably will transition to aspirin-Per cardiology    Recommend continuing Xarelto           Prostate cancer (H)     Over 10 years out,   He recommended annual PSA  Will order            Peripheral polyneuropathy     Chronic stable tingling in the balls of the feet at the end of the day  Saw podiatry in the past who told him that the nerve endings were near the surface and this is what caused his tingling  Would be atypical for peripheral neuropathy in that it is more prominent slightly proximal to the end of the toes    Nonetheless, check TSH, B12, folate acid, glucose           Relevant Orders    TSH    Vitamin B12    CBC " "with platelets    Folate    NSTEMI (non-ST elevated myocardial infarction) (H) - Primary     [facility],  Secondary prevention with antiplatelet therapy, blood pressure controlled, atorvastatin.  Having some skin aching, there is a chance this is related to high-dose atorvastatin and suggested he cut back to 40 mg temporarily           Relevant Orders    Lipid Profile (Chol, Trig, HDL, LDL calc)    ALT    Basic metabolic panel  (Ca, Cl, CO2, Creat, Gluc, K, Na, BUN)    History of pulmonary embolism     Long-term soluble anticoagulant           Healthcare maintenance     Healthcare maintenance assessment  Immunization-wishes to defer flu shot till later in the year, COVID 5 Valent given  Cancer screening-due for follow-up on colon polyps.  Discussed whether or not he should proceed with this, we will plan on it but after he has had follow-up with cardiology to determine whether he should stay on clopidogrel  Vascular-secondary prevention  Other-patient indicates that he has advanced directives and that it is on file               Factor V Leiden mutation (H)     Continue soluble anticoagulant           Carcinoma Of The Prostate Gland     PSA           Relevant Orders    PSA, tumor marker    Anxiety    Acute right-sided low back pain with right-sided sciatica    Relevant Medications    methylPREDNISolone (MEDROL DOSEPAK) 4 MG tablet therapy pack          Patient has been advised of split billing requirements and indicates understanding: Yes    COUNSELING:  Reviewed preventive health counseling, as reflected in patient instructions       Regular exercise       Healthy diet/nutrition    Estimated body mass index is 25.34 kg/m  as calculated from the following:    Height as of this encounter: 1.77 m (5' 9.69\").    Weight as of this encounter: 79.4 kg (175 lb).        He reports that he has quit smoking. He has never used smokeless tobacco.      Appropriate preventive services were discussed with this patient, " including applicable screening as appropriate for cardiovascular disease, diabetes, osteopenia/osteoporosis, and glaucoma.  As appropriate for age/gender, discussed screening for colorectal cancer, prostate cancer, breast cancer, and cervical cancer. Checklist reviewing preventive services available has been given to the patient.    Reviewed patients plan of care and provided an AVS. The Basic Care Plan (routine screening as documented in Health Maintenance) for Angel Luis meets the Care Plan requirement. This Care Plan has been established and reviewed with the Patient.    Counseling Resources:  ATP IV Guidelines  Pooled Cohorts Equation Calculator  Breast Cancer Risk Calculator  Breast Cancer: Medication to Reduce Risk  FRAX Risk Assessment  ICSI Preventive Guidelines  Dietary Guidelines for Americans, 2010  [employer]'s MyPlate  ASA Prophylaxis  Lung CA Screening    Justen Monahan MD  [facility]      Identified Health Risks:    Information on urinary incontinence and treatment options given to patient.

## 2022-09-20 NOTE — ASSESSMENT & PLAN NOTE
Chronic stable tingling in the balls of the feet at the end of the day  Saw podiatry in the past who told him that the nerve endings were near the surface and this is what caused his tingling  Would be atypical for peripheral neuropathy in that it is more prominent slightly proximal to the end of the toes    Nonetheless, check TSH, B12, folate acid, glucose

## 2022-09-20 NOTE — ASSESSMENT & PLAN NOTE
On Xarelto-long-term  Patient does have increased risk of bleeding by virtue of now using clopidogrel for myocardial infarction about 1 year ago  No bleeding problems however  Probably will transition to aspirin-Per cardiology    Recommend continuing Xarelto

## 2022-09-21 LAB — FOLATE SERPL-MCNC: 24.1 NG/ML (ref 4.6–34.8)

## 2022-09-21 NOTE — PATIENT INSTRUCTIONS
Patient Education   Personalized Prevention Plan  You are due for the preventive services outlined below.  Your care team is available to assist you in scheduling these services.  If you have already completed any of these items, please share that information with your care team to update in your medical record.  Health Maintenance Due   Topic Date Due     Flu Vaccine (1) 09/01/2022       Urinary Incontinence (Male)    Urinary incontinence means not being able to control the release of urine from the bladder.   Causes  Common causes of urinary incontinence in men include:    Infection    Certain medicines    Aging    Poor pelvic muscle tone    Bladder spasms    Obesity    Trouble urinating and fully emptying the bladder (urinary retention)  Other things that can cause incontinence are:     Nervous system diseases    Diabetes    Sleep apnea    Urinary tract infections    Prostate surgery    Pelvic injury  Constipation and smoking have also been identified as risk factors.   Symptoms    Urge incontinence (overactive bladder). This is a sudden urge to urinate. It occurs even though there may not be much urine in the bladder. The need to urinate often during the night is common. It's due to bladder spasms.    Stress incontinence. This is urine leakage that you can't control. It can occur with sneezing, coughing, and other actions that put stress on the bladder.    Treatment  Treatment depends on what is causing the condition. Bladder infections are treated with antibiotics. Urinary retention is treated with a bladder catheter.   Home care  Follow these guidelines when caring for yourself at home:    Don't have any foods and drinks that may irritate the bladder. This includes:  ? Chocolate  ? Alcohol  ? Caffeine  ? Carbonated drinks  ? Acidic fruits and juices    Limit fluids to 6 to 8 cups a day.    Lose weight if you are overweight. This will reduce your symptoms.    If advised, do regular pelvic muscle-strengthening  exercises such as Kegel exercises.    If needed, wear absorbent pads to catch urine. Change the pads often. This is for good hygiene and to prevent skin and bladder infections.    Bathe daily for good hygiene.    If an antibiotic was prescribed to treat a bladder infection, take it until it's finished. Keep taking it even if you are feeling better. This is to make sure your infection has cleared.    If a catheter was left in place, keep bacteria from getting into the collection bag. Don't disconnect the catheter from the collection bag.    Use a leg band to secure the catheter drainage tube, so it does not pull on the catheter. Drain the collection bag when it becomes full. To do this, use the drain spout at the bottom of the bag. Don't disconnect the bag from the catheter.    Don't pull on or try to remove a catheter. The catheter must be removed by a healthcare provider.    If you smoke, stop. Ask your provider for help if you can't do this on your own.  Follow-up care  Follow up with your healthcare provider, or as advised.  When to get medical advice  Call your healthcare provider right away if any of these occur:    Fever over 100.4 F (38 C), or as directed by your provider    Bladder pain or fullness    Belly swelling, nausea, or vomiting    Back pain    Weakness, dizziness, or fainting    If a catheter was left in place, return if:  ? The catheter falls out  ? The catheter stops draining for 6 hours  ? Your urine gets cloudy or smells bad  Dashawn last reviewed this educational content on 1/1/2020 2000-2021 [employer]. All rights reserved. This information is not intended as a substitute for professional medical care. Always follow your healthcare professional's instructions.

## 2022-10-04 ENCOUNTER — TRANSFERRED RECORDS (OUTPATIENT)
Dept: HEALTH INFORMATION MANAGEMENT | Facility: CLINIC | Age: 82
End: 2022-10-04

## 2022-10-07 DIAGNOSIS — Z86.711 HISTORY OF PULMONARY EMBOLISM: ICD-10-CM

## 2022-10-07 RX ORDER — RIVAROXABAN 20 MG/1
TABLET, FILM COATED ORAL
Qty: 90 TABLET | Refills: 0 | Status: SHIPPED | OUTPATIENT
Start: 2022-10-07 | End: 2023-01-16

## 2022-10-07 NOTE — TELEPHONE ENCOUNTER
Former patient of Sergei & has not established care with another provider.  Please assign refill request to covering provider per clinic standard process.    Routing refill request to provider for review/approval because:  Labs not current:  Cr cl    Last Written Prescription Date:  7/11/22  Last Fill Quantity: 90,  # refills: 0   Last office visit provider:  9/20/22     Requested Prescriptions   Pending Prescriptions Disp Refills     XARELTO ANTICOAGULANT 20 MG TABS tablet [Pharmacy Med Name: XARELTO 20MG TABLETS] 90 tablet 0     Sig: TAKE 1 TABLET BY MOUTH DAILY WITH SUPPER       Direct Oral Anticoagulant Agents Failed - 10/7/2022  9:55 AM        Failed - Creatinine Clearance greater than 50 ml/min on file in past 3 mos     No lab results found.          Passed - Normal Platelets on file in past 12 months     Recent Labs   Lab Test 09/20/22  1213                  Passed - Medication is active on med list        Passed - Serum creatinine less than or equal to 1.4 on file in past 3 mos     Recent Labs   Lab Test 09/20/22  1419   CR 1.16       Ok to refill medication if creatinine is low          Passed - Patient is 18 years of age or older        Passed - Recent (6 mo) or future (30 days) visit within the authorizing provider's specialty             Darlene Gallo RN 10/07/22 3:04 PM

## 2022-10-18 ENCOUNTER — TRANSFERRED RECORDS (OUTPATIENT)
Dept: HEALTH INFORMATION MANAGEMENT | Facility: CLINIC | Age: 82
End: 2022-10-18

## 2022-10-29 ENCOUNTER — HEALTH MAINTENANCE LETTER (OUTPATIENT)
Age: 82
End: 2022-10-29

## 2022-11-04 DIAGNOSIS — I10 HYPERTENSION: ICD-10-CM

## 2022-11-04 DIAGNOSIS — F41.1 ANXIETY STATE: ICD-10-CM

## 2022-11-04 RX ORDER — LOSARTAN POTASSIUM 100 MG/1
TABLET ORAL
Qty: 90 TABLET | Refills: 3 | Status: SHIPPED | OUTPATIENT
Start: 2022-11-04 | End: 2023-11-02

## 2022-11-04 RX ORDER — HYDROXYZINE HYDROCHLORIDE 25 MG/1
TABLET, FILM COATED ORAL
Qty: 90 TABLET | Refills: 3 | Status: SHIPPED | OUTPATIENT
Start: 2022-11-04 | End: 2023-11-02

## 2022-11-04 NOTE — TELEPHONE ENCOUNTER
"  Last Written Prescription Date:  2/9/2022  Last Fill Quantity: 90,  # refills: 2   Last office visit provider:  9/20/2022     Requested Prescriptions   Pending Prescriptions Disp Refills     hydrOXYzine (ATARAX) 25 MG tablet [Pharmacy Med Name: HYDROXYZINE HCL 25MG TABS (WHITE)] 90 tablet 2     Sig: TAKE 1 TABLET(25 MG) BY MOUTH AT BEDTIME       Antihistamines Protocol Passed - 11/4/2022  9:49 AM        Passed - Recent (12 mo) or future (30 days) visit within the authorizing provider's specialty     Patient has had an office visit with the authorizing provider or a provider within the authorizing providers department within the previous 12 mos or has a future within next 30 days. See \"Patient Info\" tab in inbasket, or \"Choose Columns\" in Meds & Orders section of the refill encounter.              Passed - Patient is age 3 or older     Apply age and/or weight-based dosing for peds patients age 3 and older.    Forward request to provider for patients under the age of 3.          Passed - Medication is active on med list        Last Written Prescription Date:  11/18/2021  Last Fill Quantity: 90,  # refills: 3   Last office visit provider:  9/20/2022        losartan (COZAAR) 100 MG tablet [Pharmacy Med Name: LOSARTAN 100MG TABLETS] 90 tablet 3     Sig: TAKE 1 TABLET(100 MG) BY MOUTH DAILY       Angiotensin-II Receptors Passed - 11/4/2022  9:49 AM        Passed - Last blood pressure under 140/90 in past 12 months     BP Readings from Last 3 Encounters:   09/20/22 118/67   10/25/21 125/68   09/08/21 132/68                 Passed - Recent (12 mo) or future (30 days) visit within the authorizing provider's specialty     Patient has had an office visit with the authorizing provider or a provider within the authorizing providers department within the previous 12 mos or has a future within next 30 days. See \"Patient Info\" tab in inbasket, or \"Choose Columns\" in Meds & Orders section of the refill encounter.              " Passed - Medication is active on med list        Passed - Patient is age 18 or older        Passed - Normal serum creatinine on file in past 12 months     Recent Labs   Lab Test 09/20/22  1419   CR 1.16       Ok to refill medication if creatinine is low          Passed - Normal serum potassium on file in past 12 months     Recent Labs   Lab Test 09/20/22  1419   POTASSIUM 4.8                         Kristine Parikh RN 11/04/22 9:58 AM

## 2022-12-11 DIAGNOSIS — G47.00 INSOMNIA, UNSPECIFIED TYPE: ICD-10-CM

## 2022-12-11 DIAGNOSIS — K20.90 ESOPHAGITIS: ICD-10-CM

## 2022-12-12 NOTE — TELEPHONE ENCOUNTER
"Routing refill request to provider for review/approval because:  On plavix    Last Written Prescription Date:  3/16/22  Last Fill Quantity: 90,  # refills: 2   Last office visit provider:   9/20/22    Requested Prescriptions   Pending Prescriptions Disp Refills     omeprazole (PRILOSEC) 20 MG DR capsule [Pharmacy Med Name: OMEPRAZOLE 20MG CAPSULES] 90 capsule 2     Sig: TAKE 1 CAPSULE BY MOUTH DAILY       PPI Protocol Failed - 12/12/2022  1:51 PM        Failed - Not on Clopidogrel (unless Pantoprazole ordered)        Passed - No diagnosis of osteoporosis on record        Passed - Recent (12 mo) or future (30 days) visit within the authorizing provider's specialty     Patient has had an office visit with the authorizing provider or a provider within the authorizing providers department within the previous 12 mos or has a future within next 30 days. See \"Patient Info\" tab in inbasket, or \"Choose Columns\" in Meds & Orders section of the refill encounter.              Passed - Medication is active on med list        Passed - Patient is age 18 or older             PARESH BLANCO RN 12/12/22 1:51 PM  "

## 2022-12-13 ENCOUNTER — MYC MEDICAL ADVICE (OUTPATIENT)
Dept: FAMILY MEDICINE | Facility: CLINIC | Age: 82
End: 2022-12-13

## 2022-12-13 DIAGNOSIS — G47.00 INSOMNIA, UNSPECIFIED TYPE: ICD-10-CM

## 2022-12-13 RX ORDER — LORAZEPAM 1 MG/1
TABLET ORAL
Qty: 90 TABLET | OUTPATIENT
Start: 2022-12-13

## 2022-12-13 NOTE — TELEPHONE ENCOUNTER
Please contact patient.  Let him know that I am refusing his lorazepam refill request today because I do not think it is a good idea for him to take it for insomnia any longer than a few days.    Guidelines for insomnia recommend against using this type of medication for anything longer than a few days in a row.  I be happy to talk about options for insomnia with the visit, virtual or other.

## 2022-12-14 RX ORDER — LORAZEPAM 1 MG/1
1 TABLET ORAL AT BEDTIME
Qty: 90 TABLET | Refills: 0 | OUTPATIENT
Start: 2022-12-14

## 2022-12-14 NOTE — TELEPHONE ENCOUNTER
Last Written Prescription Date:  9/16/2022  Last Fill Quantity: 90,  # refills: 0   Last office visit: 9/20/2022 with prescribing provider:     Future Office Visit:  N/a    RN will route this encounter to  to review and advise.          Alisha Byers RN  [facility]

## 2022-12-15 NOTE — TELEPHONE ENCOUNTER
RN called patient to relay Dr. Monahan's message.       RN assisted patient to make an appointment.      Appointments in Next    Dec 21, 2022 11:20 AM  (Arrive by 11:00 AM)  Provider Visit with Justen Monahan MD  [facility] ([facility] ) [phone]            Alisha Byers RN  [facility]

## 2022-12-15 NOTE — TELEPHONE ENCOUNTER
Please see refill encounter 12/11/2022 for additional information.       Alisha Byers RN  [facility]

## 2022-12-20 NOTE — PROGRESS NOTES
Nicolas is a 82 year old who is being evaluated via a billable video visit.      How would you like to obtain your AVS? MyChart  If the video visit is dropped, the invitation should be resent by: Text to cell phone: [phone]  Will anyone else be joining your video visit? No      Assessment & Plan   Problem List Items Addressed This Visit        Circulatory    Benign essential hypertension    NSTEMI (non-ST elevated myocardial infarction) (H)       Other    History of pulmonary embolism    History of coronary angioplasty with insertion of stent   Other Visit Diagnoses     Infection due to 2019 novel coronavirus    -  Primary    Relevant Medications    molnupiravir (LAGEVRIO) 200 MG capsule    Dextromethorphan-Guaifenesin  MG TB12            Mulnopiravir  for 5 days take after eating  Mucinex Dm 1 tab twice a day  for 7-14 days  Increase fluids  Rest  Cepacol lozenges as needed   If develop chest pain or shortness of breath-go to ED      15 minutes spent on the date of the encounter doing chart review, history and exam, documentation and further activities per the note           Return in about 5 days (around 12/26/2022), or if symptoms worsen or fail to improve.    Jayne Aldana PA-C  [facility]    Subjective   Nicolas is a 82 year old presenting for the following health issues:  Covid Concern    HPI     COVID-19 Symptom Review  How many days ago did these symptoms start? 3 days, tested positive yesterday on home rapid test    Are any of the following symptoms significant for you?    New or worsening difficulty breathing? No    Worsening cough? Yes, it's a dry cough. Intermittent    Fever or chills? Yes, the highest temperature was 99.7    Headache: YES- secondary to congestion    Sore throat: YES- in the beginning but has since resolved    Chest pain: No    Diarrhea: No    Body aches? YES    What treatments has patient tried? Acetaminophen, Decongestant - nasal and Vicks    Does patient live in a nursing home, group home, or shelter? No  Does patient have a way to get food/medications during quarantined? Yes, I have a friend or family member who can help me.        Review of Systems   Constitutional, HEENT, cardiovascular, pulmonary, gi and gu systems are negative, except as otherwise noted.      Objective    Vitals - Patient Reported  Temperature (Patient Reported): 99.7  F (37.6  C)  Vitals:  No vitals were obtained today due to virtual visit.    Physical Exam   GENERAL: Healthy, alert and no distress  EYES: Eyes grossly normal to inspection.  No discharge or erythema, or obvious scleral/conjunctival abnormalities.  RESP: No audible wheeze, cough, or visible cyanosis.  No visible retractions or increased work of breathing.    SKIN: Visible skin clear. No significant rash, abnormal pigmentation or lesions.  NEURO: Cranial nerves grossly intact.  Mentation and speech appropriate for age.  PSYCH: Mentation appears normal, affect normal/bright, judgement and insight intact, normal speech and appearance well-groomed.                Video-Visit Details    Video Start Time: 12:23 PM    Type of service:  Video Visit    Video End Time:12:36 PM    Originating Location (pt. Location): Home        Distant Location (provider location):  Off-site    Platform used for Video Visit: Elías

## 2022-12-20 NOTE — PATIENT INSTRUCTIONS
At [facility], we strive to deliver an exceptional experience to you, every time we see you. If you receive a survey, please complete it as we do value your feedback.  If you have MyChart, you can expect to receive results automatically within 24 hours of their completion.  Your provider will send a note interpreting your results as well.   If you do not have MyChart, you should receive your results in about a week by mail.    Your care team:                            Family Medicine Internal Medicine   MD Fam Pompa MD Shantel Branch-Fleming, MD Srinivasa Vaka, MD Katya Belousova, PALUIS MIGUEL Campbell CNP, MD ([facility]   Richard Patricio, MD Sindy Mendosa MD Amelia Massimini APRN KAYLA Hall APRN MD Cary Zamudio MD          Clinic hours: Monday - Thursday 7 am-6 pm; Fridays 7 am-5 pm.   Urgent care: Monday - Friday 10 am- 8 pm; Saturday and Sunday 9 am-5 pm.    Clinic: [phone]       [facility]: Monday - Thursday 8 am - 7 pm; Friday 8 am - 6 pm  [facility]: [phone]      0

## 2022-12-21 ENCOUNTER — VIRTUAL VISIT (OUTPATIENT)
Dept: FAMILY MEDICINE | Facility: CLINIC | Age: 82
End: 2022-12-21
Payer: MEDICARE

## 2022-12-21 DIAGNOSIS — I10 BENIGN ESSENTIAL HYPERTENSION: ICD-10-CM

## 2022-12-21 DIAGNOSIS — Z95.5 HISTORY OF CORONARY ANGIOPLASTY WITH INSERTION OF STENT: ICD-10-CM

## 2022-12-21 DIAGNOSIS — Z86.711 HISTORY OF PULMONARY EMBOLISM: ICD-10-CM

## 2022-12-21 DIAGNOSIS — I21.4 NSTEMI (NON-ST ELEVATED MYOCARDIAL INFARCTION) (H): ICD-10-CM

## 2022-12-21 DIAGNOSIS — U07.1 INFECTION DUE TO 2019 NOVEL CORONAVIRUS: Primary | ICD-10-CM

## 2022-12-21 PROCEDURE — 99213 OFFICE O/P EST LOW 20 MIN: CPT | Mod: CS | Performed by: PHYSICIAN ASSISTANT

## 2022-12-21 RX ORDER — GUAIFENESIN AND DEXTROMETHORPHAN HYDROBROMIDE 1200; 60 MG/1; MG/1
1 TABLET, EXTENDED RELEASE ORAL 2 TIMES DAILY
Qty: 28 TABLET | Refills: 0 | Status: SHIPPED | OUTPATIENT
Start: 2022-12-21

## 2023-01-10 ENCOUNTER — NURSE TRIAGE (OUTPATIENT)
Dept: NURSING | Facility: CLINIC | Age: 83
End: 2023-01-10

## 2023-01-10 NOTE — TELEPHONE ENCOUNTER
.  Nurse Triage SBAR    Is this a 2nd Level Triage? NO    Situation: Patient had COVID before christmas, starting feeling cold symptoms after new years and still having sinus issues    Background: COVID 12/21, hypertension, hx of DVT    Assessment: Felt better after taking Mulnopiravir, then after new years started having symptoms of a cold, Taking mucous relief and benadryl does not seem to help, has sinus pressure, constant sneezing and watery eyes, no fever 98.6 orally , no SOB or chest pain.     Protocol Recommended Disposition:   No disposition on file. Be seen today or tomorrow, was transferred to scheduling, care advise given     Sheryl Vang RN on 1/10/2023 at 4:33 PM    Additional Information    Negative: Sounds like a life-threatening emergency to the triager    Negative: Difficulty breathing, and not from stuffy nose (e.g., not relieved by cleaning out the nose)    Negative: SEVERE headache and has fever    Negative: Patient sounds very sick or weak to the triager    Negative: SEVERE sinus pain    Negative: Severe headache    Negative: Redness or swelling on the cheek, forehead, or around the eye    Negative: Fever > 103 F (39.4 C)    Negative: Fever > 101 F (38.3 C) and over 60 years of age    Negative: Fever > 100.0 F (37.8 C) and has diabetes mellitus or a weak immune system (e.g., HIV positive, cancer chemotherapy, organ transplant, splenectomy, chronic steroids)    Negative: Fever > 100.0 F (37.8 C) and bedridden (e.g., nursing home patient, stroke, chronic illness, recovering from surgery)    Negative: Fever present > 3 days (72 hours)    Negative: Fever returns after gone for over 24 hours and symptoms worse or not improved    Negative: Sinus pain (not just congestion) and fever    Negative: Earache    Sinus congestion (pressure, fullness) present > 10 days    Protocols used: SINUS PAIN AND CONGESTION-A-OH

## 2023-02-15 ENCOUNTER — TRANSFERRED RECORDS (OUTPATIENT)
Dept: HEALTH INFORMATION MANAGEMENT | Facility: CLINIC | Age: 83
End: 2023-02-15

## 2023-03-06 ENCOUNTER — TRANSFERRED RECORDS (OUTPATIENT)
Dept: HEALTH INFORMATION MANAGEMENT | Facility: CLINIC | Age: 83
End: 2023-03-06

## 2023-03-16 DIAGNOSIS — G47.00 INSOMNIA, UNSPECIFIED TYPE: ICD-10-CM

## 2023-03-16 DIAGNOSIS — E78.5 HYPERLIPIDEMIA, UNSPECIFIED HYPERLIPIDEMIA TYPE: ICD-10-CM

## 2023-03-20 DIAGNOSIS — I10 BENIGN ESSENTIAL HYPERTENSION: ICD-10-CM

## 2023-03-20 DIAGNOSIS — I10 BENIGN ESSENTIAL HYPERTENSION: Primary | ICD-10-CM

## 2023-03-20 RX ORDER — LORAZEPAM 1 MG/1
TABLET ORAL
Qty: 90 TABLET | Refills: 0 | Status: SHIPPED | OUTPATIENT
Start: 2023-03-20 | End: 2023-06-12

## 2023-03-20 RX ORDER — ATORVASTATIN CALCIUM 80 MG/1
TABLET, FILM COATED ORAL
Qty: 90 TABLET | Refills: 0 | Status: SHIPPED | OUTPATIENT
Start: 2023-03-20 | End: 2023-06-11

## 2023-03-20 RX ORDER — METOPROLOL SUCCINATE 50 MG/1
50 TABLET, EXTENDED RELEASE ORAL DAILY
Qty: 30 TABLET | Refills: 3 | Status: SHIPPED | OUTPATIENT
Start: 2023-03-20 | End: 2023-03-21

## 2023-03-21 ENCOUNTER — MYC MEDICAL ADVICE (OUTPATIENT)
Dept: FAMILY MEDICINE | Facility: CLINIC | Age: 83
End: 2023-03-21
Payer: MEDICARE

## 2023-03-21 RX ORDER — METOPROLOL SUCCINATE 50 MG/1
TABLET, EXTENDED RELEASE ORAL
Qty: 90 TABLET | Refills: 0 | Status: SHIPPED | OUTPATIENT
Start: 2023-03-21

## 2023-03-21 NOTE — TELEPHONE ENCOUNTER
pharmacy is requesting a 90 day supply of medication.    No note seen as to why this medication was started, as was order only encounter.  Not previously prescribed at all and new medication for patient.       Routing to PCP to review if 90 day supply is appropriate or if there is a plan that wasn't documented in chart about doing a trial of this medication.    Karely Tarango RN on 3/20/2023 at 10:55 PM

## 2023-05-06 ENCOUNTER — MYC MEDICAL ADVICE (OUTPATIENT)
Dept: FAMILY MEDICINE | Facility: CLINIC | Age: 83
End: 2023-05-06
Payer: MEDICARE

## 2023-05-06 DIAGNOSIS — I10 BENIGN ESSENTIAL HYPERTENSION: ICD-10-CM

## 2023-05-08 RX ORDER — AMLODIPINE BESYLATE 10 MG/1
10 TABLET ORAL DAILY
Qty: 90 TABLET | Refills: 3 | Status: SHIPPED | OUTPATIENT
Start: 2023-05-08 | End: 2024-04-29

## 2023-05-08 NOTE — TELEPHONE ENCOUNTER
RE amloipine- yes but we could still refill it- this was on hold for a rash and he was working with dermatology- he can choose to go back on it  I dont see the refill request (?)

## 2023-05-26 ENCOUNTER — OFFICE VISIT (OUTPATIENT)
Dept: INTERNAL MEDICINE | Facility: CLINIC | Age: 83
End: 2023-05-26
Payer: MEDICARE

## 2023-05-26 VITALS
RESPIRATION RATE: 16 BRPM | WEIGHT: 175 LBS | TEMPERATURE: 98.2 F | DIASTOLIC BLOOD PRESSURE: 76 MMHG | BODY MASS INDEX: 25.92 KG/M2 | HEART RATE: 55 BPM | SYSTOLIC BLOOD PRESSURE: 122 MMHG | OXYGEN SATURATION: 98 % | HEIGHT: 69 IN

## 2023-05-26 DIAGNOSIS — I25.10 CORONARY ARTERY DISEASE INVOLVING NATIVE CORONARY ARTERY OF NATIVE HEART WITHOUT ANGINA PECTORIS: ICD-10-CM

## 2023-05-26 DIAGNOSIS — Z86.711 HISTORY OF PULMONARY EMBOLISM: ICD-10-CM

## 2023-05-26 DIAGNOSIS — R42 VERTIGO: Primary | ICD-10-CM

## 2023-05-26 PROCEDURE — 99213 OFFICE O/P EST LOW 20 MIN: CPT | Performed by: INTERNAL MEDICINE

## 2023-05-26 RX ORDER — ASPIRIN 81 MG/1
81 TABLET ORAL DAILY
Start: 2023-05-26

## 2023-05-26 ASSESSMENT — PAIN SCALES - GENERAL: PAINLEVEL: NO PAIN (0)

## 2023-05-26 NOTE — PROGRESS NOTES
"  1. Vertigo  Appears to be peripheral, no other neurological symptoms.  He is on excellent medical management for vascular risk factor reduction.  Currently has on a monitor for potential bradycardia but when he had his vertiginous episodes his heart rate was sinus bradycardia in the 50s.  I would like him to try some Epley maneuvers and follow-up with his primary physician in the next 1 to 2 weeks    2. Coronary artery disease involving native coronary artery of native heart without angina pectoris  Continue secondary prevention  - aspirin 81 MG EC tablet; Take 1 tablet (81 mg) by mouth daily    3. History of pulmonary embolism  He is on blood thinner, continue    Subjective   Nicolas is a 82 year old, presenting for the following health issues:  Hospital F/U        5/26/2023     7:40 AM   Additional Questions   Roomed by Serafin MUNOZ       Hospital Follow-up Visit:    Hospital/Nursing Home/IP Rehab Facility: [facility]  Date of Admission: 5/24  Date of Discharge: 5/24  Reason(s) for Admission: Dizziness    Was your hospitalization related to COVID-19? No   Problems taking medications regularly:  None  Medication changes since discharge: None  Problems adhering to non-medication therapy:  None    Summary of hospitalization:  [facility] discharge summary reviewed  Nothing was reviewed in terms of the discharge summary because this was not a hospitalization  Diagnostic Tests/Treatments reviewed.  Follow up needed: none  Other Healthcare Providers Involved in Patient s Care:         None  Update since discharge: improved.         Plan of care communicated with patient             Review of Systems         Objective    /76 (BP Location: Left arm, Patient Position: Sitting, Cuff Size: Adult Small)   Pulse 55   Temp 98.2  F (36.8  C) (Tympanic)   Resp 16   Ht 1.753 m (5' 9\")   Wt 79.4 kg (175 lb)   SpO2 98%   BMI 25.84 kg/m    Body mass index is 25.84 kg/m .  Physical Exam   Neurologically, " cranial nerves intact, no nystagmus, normal strength sensation reflexes in the lower and upper extremity.  Gait normal tandem gait is actually pretty good

## 2023-06-08 DIAGNOSIS — R68.2 DRY MOUTH: ICD-10-CM

## 2023-06-08 RX ORDER — PILOCARPINE HYDROCHLORIDE 5 MG/1
TABLET, FILM COATED ORAL
Qty: 360 TABLET | Refills: 3 | Status: SHIPPED | OUTPATIENT
Start: 2023-06-08 | End: 2024-10-03

## 2023-06-10 DIAGNOSIS — G47.00 INSOMNIA, UNSPECIFIED TYPE: ICD-10-CM

## 2023-06-10 DIAGNOSIS — E78.5 HYPERLIPIDEMIA, UNSPECIFIED HYPERLIPIDEMIA TYPE: ICD-10-CM

## 2023-06-11 NOTE — TELEPHONE ENCOUNTER
"Routing refill request to provider for review/approval because:  Drug not on the [facility] refill protocol Controlled substance  Last Written Prescription Date:  3/20/2023  Last Fill Quantity: 90,  # refills: 0   Last office visit provider:  5/26/2023     Routing refill request to provider for review/approval because:  Epic not allowing reorder    Last Written Prescription Date:  3/20/2023  Last Fill Quantity: 90,  # refills: 0   Last office visit provider:  5/26/2023     Requested Prescriptions   Pending Prescriptions Disp Refills     LORazepam (ATIVAN) 1 MG tablet [Pharmacy Med Name: LORAZEPAM 1MG TABLETS] 90 tablet      Sig: TAKE 1 TABLET(1 MG) BY MOUTH AT BEDTIME       There is no refill protocol information for this order        atorvastatin (LIPITOR) 80 MG tablet [Pharmacy Med Name: ATORVASTATIN 80MG TABLETS] 90 tablet 1     Sig: TAKE 1 TABLET(80 MG) BY MOUTH DAILY       Statins Protocol Passed - 6/11/2023  1:49 PM        Passed - LDL on file in past 12 months     Recent Labs   Lab Test 09/20/22  1419   LDL 49             Passed - No abnormal creatine kinase in past 12 months     No lab results found.             Passed - Recent (12 mo) or future (30 days) visit within the authorizing provider's specialty     Patient has had an office visit with the authorizing provider or a provider within the authorizing providers department within the previous 12 mos or has a future within next 30 days. See \"Patient Info\" tab in inbasket, or \"Choose Columns\" in Meds & Orders section of the refill encounter.              Passed - Medication is active on med list        Passed - Patient is age 18 or older             Renae Kitchen RN 06/11/23 2:02 PM  "

## 2023-06-12 RX ORDER — ATORVASTATIN CALCIUM 80 MG/1
TABLET, FILM COATED ORAL
Qty: 90 TABLET | Refills: 1 | Status: SHIPPED | OUTPATIENT
Start: 2023-06-12 | End: 2023-12-07

## 2023-06-12 RX ORDER — LORAZEPAM 1 MG/1
TABLET ORAL
Qty: 90 TABLET | Refills: 0 | Status: SHIPPED | OUTPATIENT
Start: 2023-06-12 | End: 2023-09-06

## 2023-07-10 DIAGNOSIS — Z86.711 HISTORY OF PULMONARY EMBOLISM: ICD-10-CM

## 2023-07-10 RX ORDER — RIVAROXABAN 20 MG/1
TABLET, FILM COATED ORAL
Qty: 90 TABLET | Refills: 2 | OUTPATIENT
Start: 2023-07-10

## 2023-07-10 RX ORDER — RIVAROXABAN 20 MG/1
TABLET, FILM COATED ORAL
Qty: 90 TABLET | Refills: 2 | Status: SHIPPED | OUTPATIENT
Start: 2023-07-10 | End: 2024-04-11

## 2023-07-10 NOTE — TELEPHONE ENCOUNTER
Refusing as a duplicate request.   Already routed rivaroxaban to PCP for review.   Faith Galo RN   07/10/23 10:50 AM  [facility] Nurse Advisor

## 2023-07-10 NOTE — TELEPHONE ENCOUNTER
Routing refill request to provider for review/approval because:  Labs not current:  CR, Creatinine Clearance  Early refill request  Anticoagulant needs provider review    Last Written Prescription Date:  1/17/2023  Last Fill Quantity: 90,  # refills: 2   Last office visit provider:  5/26/2023     Requested Prescriptions   Pending Prescriptions Disp Refills     XARELTO ANTICOAGULANT 20 MG TABS tablet [Pharmacy Med Name: XARELTO 20MG TABLETS] 90 tablet 2     Sig: TAKE 1 TABLET BY MOUTH DAILY WITH SUPPER       Direct Oral Anticoagulant Agents Failed - 7/10/2023  8:09 AM        Failed - Creatinine Clearance greater than 50 ml/min on file in past 3 mos     No lab results found.          Failed - Serum creatinine less than or equal to 1.4 on file in past 3 mos     Recent Labs   Lab Test 09/20/22  1419   CR 1.16       Ok to refill medication if creatinine is low          Failed - Recent (6 mo) or future (30 days) visit within the authorizing provider's specialty        Passed - Normal Platelets on file in past 12 months     Recent Labs   Lab Test 09/20/22  1213                  Passed - Medication is active on med list        Passed - Patient is 18 years of age or older             Faith Galo RN 07/10/23 10:48 AM

## 2023-07-12 ENCOUNTER — TRANSFERRED RECORDS (OUTPATIENT)
Dept: HEALTH INFORMATION MANAGEMENT | Facility: CLINIC | Age: 83
End: 2023-07-12
Payer: MEDICARE

## 2023-09-05 DIAGNOSIS — G47.00 INSOMNIA, UNSPECIFIED TYPE: ICD-10-CM

## 2023-09-06 RX ORDER — LORAZEPAM 1 MG/1
TABLET ORAL
Qty: 90 TABLET | OUTPATIENT
Start: 2023-09-06

## 2023-09-06 RX ORDER — LORAZEPAM 1 MG/1
1 TABLET ORAL AT BEDTIME
Qty: 90 TABLET | Refills: 0 | Status: SHIPPED | OUTPATIENT
Start: 2023-09-06 | End: 2023-12-07

## 2023-10-01 ASSESSMENT — ENCOUNTER SYMPTOMS
SORE THROAT: 0
SHORTNESS OF BREATH: 0
FEVER: 0
WEAKNESS: 0
HEARTBURN: 0
NAUSEA: 0
FREQUENCY: 0
ABDOMINAL PAIN: 0
HEMATURIA: 0
ARTHRALGIAS: 0
PARESTHESIAS: 0
DIZZINESS: 0
HEADACHES: 0
NERVOUS/ANXIOUS: 0
DYSURIA: 0
CONSTIPATION: 0
COUGH: 0
MYALGIAS: 0
PALPITATIONS: 0
EYE PAIN: 0
HEMATOCHEZIA: 0
JOINT SWELLING: 0
DIARRHEA: 0
CHILLS: 0

## 2023-10-01 ASSESSMENT — ACTIVITIES OF DAILY LIVING (ADL): CURRENT_FUNCTION: NO ASSISTANCE NEEDED

## 2023-10-04 ENCOUNTER — OFFICE VISIT (OUTPATIENT)
Dept: FAMILY MEDICINE | Facility: CLINIC | Age: 83
End: 2023-10-04
Payer: MEDICARE

## 2023-10-04 VITALS
DIASTOLIC BLOOD PRESSURE: 56 MMHG | SYSTOLIC BLOOD PRESSURE: 118 MMHG | HEIGHT: 70 IN | HEART RATE: 50 BPM | OXYGEN SATURATION: 99 % | RESPIRATION RATE: 13 BRPM | TEMPERATURE: 97.1 F | BODY MASS INDEX: 26.45 KG/M2 | WEIGHT: 184.75 LBS

## 2023-10-04 DIAGNOSIS — D68.51 FACTOR V LEIDEN MUTATION (H): ICD-10-CM

## 2023-10-04 DIAGNOSIS — C61 PROSTATE CANCER (H): ICD-10-CM

## 2023-10-04 DIAGNOSIS — R32 URINARY INCONTINENCE, UNSPECIFIED TYPE: ICD-10-CM

## 2023-10-04 DIAGNOSIS — I26.99 PULMONARY EMBOLISM, UNSPECIFIED CHRONICITY, UNSPECIFIED PULMONARY EMBOLISM TYPE, UNSPECIFIED WHETHER ACUTE COR PULMONALE PRESENT (H): ICD-10-CM

## 2023-10-04 DIAGNOSIS — I82.409 DEEP VEIN THROMBOSIS (DVT) OF LOWER EXTREMITY, UNSPECIFIED CHRONICITY, UNSPECIFIED LATERALITY, UNSPECIFIED VEIN (H): ICD-10-CM

## 2023-10-04 DIAGNOSIS — Z00.00 HEALTHCARE MAINTENANCE: Primary | ICD-10-CM

## 2023-10-04 DIAGNOSIS — I21.4 NSTEMI (NON-ST ELEVATED MYOCARDIAL INFARCTION) (H): ICD-10-CM

## 2023-10-04 DIAGNOSIS — G47.00 INSOMNIA, UNSPECIFIED TYPE: ICD-10-CM

## 2023-10-04 DIAGNOSIS — R04.0 RECURRENT EPISTAXIS: ICD-10-CM

## 2023-10-04 DIAGNOSIS — E78.5 HYPERLIPIDEMIA, UNSPECIFIED HYPERLIPIDEMIA TYPE: ICD-10-CM

## 2023-10-04 DIAGNOSIS — Z00.00 ENCOUNTER FOR MEDICARE ANNUAL WELLNESS EXAM: ICD-10-CM

## 2023-10-04 DIAGNOSIS — F41.1 ANXIETY STATE: ICD-10-CM

## 2023-10-04 PROBLEM — Z86.711 HISTORY OF PULMONARY EMBOLISM: Status: RESOLVED | Noted: 2021-10-25 | Resolved: 2023-10-04

## 2023-10-04 LAB — HGB BLD-MCNC: 13.7 G/DL (ref 13.3–17.7)

## 2023-10-04 PROCEDURE — 84460 ALANINE AMINO (ALT) (SGPT): CPT | Performed by: FAMILY MEDICINE

## 2023-10-04 PROCEDURE — 99213 OFFICE O/P EST LOW 20 MIN: CPT | Mod: 25 | Performed by: FAMILY MEDICINE

## 2023-10-04 PROCEDURE — 84153 ASSAY OF PSA TOTAL: CPT | Performed by: FAMILY MEDICINE

## 2023-10-04 PROCEDURE — 36415 COLL VENOUS BLD VENIPUNCTURE: CPT | Performed by: FAMILY MEDICINE

## 2023-10-04 PROCEDURE — 91320 SARSCV2 VAC 30MCG TRS-SUC IM: CPT | Performed by: FAMILY MEDICINE

## 2023-10-04 PROCEDURE — 85018 HEMOGLOBIN: CPT | Performed by: FAMILY MEDICINE

## 2023-10-04 PROCEDURE — 80061 LIPID PANEL: CPT | Performed by: FAMILY MEDICINE

## 2023-10-04 PROCEDURE — G0439 PPPS, SUBSEQ VISIT: HCPCS | Performed by: FAMILY MEDICINE

## 2023-10-04 PROCEDURE — 90480 ADMN SARSCOV2 VAC 1/ONLY CMP: CPT | Performed by: FAMILY MEDICINE

## 2023-10-04 PROCEDURE — 80048 BASIC METABOLIC PNL TOTAL CA: CPT | Performed by: FAMILY MEDICINE

## 2023-10-04 ASSESSMENT — ENCOUNTER SYMPTOMS
NERVOUS/ANXIOUS: 0
HEARTBURN: 0
HEMATURIA: 0
FEVER: 0
PARESTHESIAS: 0
DYSURIA: 0
ARTHRALGIAS: 0
MYALGIAS: 0
EYE PAIN: 0
SORE THROAT: 0
DIARRHEA: 0
HEMATOCHEZIA: 0
PALPITATIONS: 0
COUGH: 0
CHILLS: 0
WEAKNESS: 0
FREQUENCY: 0
HEADACHES: 0
CONSTIPATION: 0
SHORTNESS OF BREATH: 0
DIZZINESS: 0
JOINT SWELLING: 0
ABDOMINAL PAIN: 0
NAUSEA: 0

## 2023-10-04 ASSESSMENT — ACTIVITIES OF DAILY LIVING (ADL): CURRENT_FUNCTION: NO ASSISTANCE NEEDED

## 2023-10-04 NOTE — PROGRESS NOTES
"SUBJECTIVE:   Nicolas is a 83 year old who presents for Preventive Visit.      10/4/2023     2:07 PM   Additional Questions   Roomed by Dania GARCIA     Social History     Social History Narrative    Not on file        I have reviewed family history  Family History   Problem Relation Age of Onset    Cerebrovascular Disease Father         Acute Myocardial Infarction    Heart Disease Father     CABG Mother     Heart Disease Mother     Heart Disease Sister         all four sisters    Heart Disease Sister     Heart Disease Sister     Heart Disease Sister                Are you in the first 12 months of your Medicare coverage?  No    Healthy Habits:     In general, how would you rate your overall health?  Good    Frequency of exercise:  4-5 days/week    Duration of exercise:  30-45 minutes    Do you usually eat at least 4 servings of fruit and vegetables a day, include whole grains    & fiber and avoid regularly eating high fat or \"junk\" foods?  Yes    Taking medications regularly:  Yes    Medication side effects:  None    Ability to successfully perform activities of daily living:  No assistance needed    Home Safety:  No safety concerns identified    Hearing Impairment:  Find that men's voices are easier to understand than woman's    In the past 6 months, have you been bothered by leaking of urine? Yes    In general, how would you rate your overall mental or emotional health?  Excellent    Additional concerns today:  No  Epistaxis  Pertinent negatives include no abdominal pain, arthralgias, chest pain, chills, congestion, coughing, fever, headaches, joint swelling, myalgias, nausea, rash, sore throat or weakness.       Today's PHQ-2 Score:       10/4/2023     1:48 PM   PHQ-2 ( 1999 Pf[employer])   Q1: Little interest or pleasure in doing things 0   Q2: Feeling down, depressed or hopeless 0   PHQ-2 Score 0   Q1: Little interest or pleasure in doing things Not at all   Q2: Feeling down, depressed or hopeless Not at all   PHQ-2 Score 0 "           Have you ever done Advance Care Planning? (For example, a Health Directive, POLST, or a discussion with a medical provider or your loved ones about your wishes): Yes, advance care planning is on file.       Fall risk  Unable to complete due to virtual visit; need for additional assessment in future face-to-face visit  No falls in the last year.  Cognitive Screening   1) Repeat 3 items (Leader, Season, Table)    2) Clock draw: NORMAL  3) 3 item recall: Recalls 3 objects  Results: 3 items recalled: COGNITIVE IMPAIRMENT LESS LIKELY    Mini-CogTM Copyright S Tara. Licensed by the author for use in [facility]; reprinted with permission ([email or web address]). All rights reserved.      Do you have sleep apnea, excessive snoring or daytime drowsiness? : no    Reviewed and updated as needed this visit by clinical staff   Tobacco  Allergies  Meds              Reviewed and updated as needed this visit by Provider                 Social History     Tobacco Use    Smoking status: Former     Packs/day: 2.00     Years: 20.00     Pack years: 40.00     Types: Cigarettes     Start date: 1958     Quit date: 1982     Years since quittin.7     Passive exposure: Never    Smokeless tobacco: Never   Substance Use Topics    Alcohol use: Yes     Alcohol/week: 2.0 standard drinks of alcohol     Comment: 1 glass red wine/day             10/1/2023    12:33 PM   Alcohol Use   Prescreen: >3 drinks/day or >7 drinks/week? No     Do you have a current opioid prescription? No  Do you use any other controlled substances or medications that are not prescribed by a provider? None              Current providers sharing in care for this patient include:   Patient Care Team:  Justen Monahan MD as PCP - General (Family Medicine)  Angel Luis Rivas MD as MD (Hematology & Oncology)  Justen Monahan MD as Assigned PCP    The following health maintenance items are reviewed in Epic and correct as of today:  Health Maintenance  "  Topic Date Due    MEDICARE ANNUAL WELLNESS VISIT  10/28/2021    INFLUENZA VACCINE (1) 09/01/2023    COVID-19 Vaccine (6 - 2023-24 season) 09/01/2023    ANNUAL REVIEW OF HM ORDERS  09/20/2023    FALL RISK ASSESSMENT  10/04/2024    ADVANCE CARE PLANNING  09/09/2026    DTAP/TDAP/TD IMMUNIZATION (6 - Td or Tdap) 09/20/2032    PHQ-2 (once per calendar year)  Completed    Pneumococcal Vaccine: 65+ Years  Completed    ZOSTER IMMUNIZATION  Completed    IPV IMMUNIZATION  Aged Out    HPV IMMUNIZATION  Aged Out    MENINGITIS IMMUNIZATION  Aged Out    LUNG CANCER SCREENING  Discontinued               Review of Systems   Constitutional:  Negative for chills and fever.   HENT:  Positive for nosebleeds. Negative for congestion, ear pain, hearing loss and sore throat.    Eyes:  Negative for pain and visual disturbance.   Respiratory:  Negative for cough and shortness of breath.    Cardiovascular:  Negative for chest pain, palpitations and peripheral edema.   Gastrointestinal:  Negative for abdominal pain, constipation, diarrhea, heartburn, hematochezia and nausea.   Genitourinary:  Positive for impotence. Negative for dysuria, frequency, genital sores, hematuria, penile discharge and urgency.   Musculoskeletal:  Negative for arthralgias, joint swelling and myalgias.   Skin:  Negative for rash.   Neurological:  Negative for dizziness, weakness, headaches and paresthesias.   Psychiatric/Behavioral:  Negative for mood changes. The patient is not nervous/anxious.          OBJECTIVE:   /56 (BP Location: Left arm, Patient Position: Sitting, Cuff Size: Adult Regular)   Pulse 50   Temp 97.1  F (36.2  C) (Oral)   Resp 13   Ht 1.766 m (5' 9.53\")   Wt 83.8 kg (184 lb 12 oz)   SpO2 99%   BMI 26.87 kg/m   Estimated body mass index is 26.87 kg/m  as calculated from the following:    Height as of this encounter: 1.766 m (5' 9.53\").    Weight as of this encounter: 83.8 kg (184 lb 12 oz).  Physical Exam          ASSESSMENT / PLAN: " "    Hyperlipidemia  Atorvastatin 80 and Zetia.  Check lipid and ALT today.  Secondary prevention for coronary artery disease.      Pulmonary embolus (H)  History of pulmonary embolism, factor V Leiden's, lifelong Xarelto 20 mg  Using aspirin as well  Is having recurrent mild epistaxis.  Normal-appearing right nostril, will have him see ENT.  Check hemoglobin    Factor V Leiden mutation (H)  See discussion of DVT    DVT (deep venous thrombosis) (H)  Discussion of PE    NSTEMI (non-ST elevated myocardial infarction) (H)  RCA stent October 2021.  Aspirin, statin, blood pressure controlled.    Healthcare maintenance  Patient has advanced directives, reviewed this today.  No life-sustaining treatment if prognosis is poor  COVID-vaccine, wishes to delay flu shot until later in the nory.  Otherwise up-to-date    Insomnia, unspecified type  Ongoing difficulty despite use of lorazepam at night.  Recently saw sleep medicine.  Discussed sleep hygiene, getting off of additional hydroxyzine that I had prescribed him to try to spare lorazepam.  Following up with sleep later this month  Meantime, I agreed to continue to prescribe lorazepam (he attempted to stop it last year-unable )  Was referred to CBT by nurse practitioner at sleep center, I openly endorsed this idea with him today.    Recurrent epistaxis  Right nostril, using a moisturizer ointment, still gets nosebleeds about once per week.  On DOAC plus aspirin    Exam pretty unremarkable, Kesselbach area is dried out  Discussed options, will refer to ENT    Urinary incontinence, unspecified type  Following radical prostatectomy.  Discussed pelvic floor therapy, will refer back to urology       COUNSELING:  Reviewed preventive health counseling, as reflected in patient instructions       Regular exercise      BMI:   Estimated body mass index is 26.87 kg/m  as calculated from the following:    Height as of this encounter: 1.766 m (5' 9.53\").    Weight as of this encounter: " 83.8 kg (184 lb 12 oz).         He reports that he quit smoking about 41 years ago. His smoking use included cigarettes. He started smoking about 65 years ago. He has a 40.00 pack-year smoking history. He has never been exposed to tobacco smoke. He has never used smokeless tobacco.      Appropriate preventive services were discussed with this patient, including applicable screening as appropriate for fall prevention, nutrition, physical activity, Tobacco-use cessation, weight loss and cognition.  Checklist reviewing preventive services available has been given to the patient.    Reviewed patients plan of care and provided an AVS. The Basic Care Plan (routine screening as documented in Health Maintenance) for Angel Luis meets the Care Plan requirement. This Care Plan has been established and reviewed with the Patient.          Justen Monahan MD  [facility]    Identified Health Risks:  I have reviewed Opioid Use Disorder and Substance Use Disorder risk factors and made any needed referrals. The patient was provided with written information regarding signs of hearing loss.  Information on urinary incontinence and treatment options given to patient.

## 2023-10-04 NOTE — ASSESSMENT & PLAN NOTE
Patient has advanced directives, reviewed this today.  No life-sustaining treatment if prognosis is poor  COVID-vaccine, wishes to delay flu shot until later in the nory.  Otherwise up-to-date

## 2023-10-04 NOTE — ASSESSMENT & PLAN NOTE
Right nostril, using a moisturizer ointment, still gets nosebleeds about once per week.  On DOAC plus aspirin    Exam pretty unremarkable, Kesselbach area is dried out  Discussed options, will refer to ENT

## 2023-10-04 NOTE — ASSESSMENT & PLAN NOTE
History of pulmonary embolism, factor V Leiden's, lifelong Xarelto 20 mg  Using aspirin as well  Is having recurrent mild epistaxis.  Normal-appearing right nostril, will have him see ENT.  Check hemoglobin

## 2023-10-04 NOTE — ASSESSMENT & PLAN NOTE
Ongoing difficulty despite use of lorazepam at night.  Recently saw sleep medicine.  Discussed sleep hygiene, getting off of additional hydroxyzine that I had prescribed him to try to spare lorazepam.  Following up with sleep later this month  Meantime, I agreed to continue to prescribe lorazepam (he attempted to stop it last year-unable )  Was referred to CBT by nurse practitioner at sleep center, I openly endorsed this idea with him today.

## 2023-10-04 NOTE — ASSESSMENT & PLAN NOTE
Atorvastatin 80 and Zetia.  Check lipid and ALT today.  Secondary prevention for coronary artery disease.

## 2023-10-04 NOTE — PATIENT INSTRUCTIONS
Patient Education   Personalized Prevention Plan  You are due for the preventive services outlined below.  Your care team is available to assist you in scheduling these services.  If you have already completed any of these items, please share that information with your care team to update in your medical record.  Health Maintenance Due   Topic Date Due     Flu Vaccine (1) 09/01/2023     COVID-19 Vaccine (6 - 2023-24 season) 09/01/2023     Hearing Loss: Care Instructions  Overview     Hearing loss is a sudden or slow decrease in how well you hear. It can range from slight to profound. Permanent hearing loss can occur with aging. It also can happen when you are exposed long-term to loud noise. Examples include listening to loud music, riding motorcycles, or being around other loud machines.  Hearing loss can affect your work and home life. It can make you feel lonely or depressed. You may feel that you have lost your independence. But hearing aids and other devices can help you hear better and feel connected to others.  Follow-up care is a key part of your treatment and safety. Be sure to make and go to all appointments, and call your doctor if you are having problems. It's also a good idea to know your test results and keep a list of the medicines you take.  How can you care for yourself at home?  Avoid loud noises whenever possible. This helps keep your hearing from getting worse.  Always wear hearing protection around loud noises.  Wear a hearing aid as directed.  A professional can help you pick a hearing aid that will work best for you.  You can also get hearing aids over the counter for mild to moderate hearing loss.  Have hearing tests as your doctor suggests. They can show whether your hearing has changed. Your hearing aid may need to be adjusted.  Use other devices as needed. These may include:  Telephone amplifiers and hearing aids that can connect to a television, stereo, radio, or microphone.  Devices that  "use lights or vibrations. These alert you to the doorbell, a ringing telephone, or a baby monitor.  Television closed-captioning. This shows the words at the bottom of the screen. Most new TVs can do this.  TTY (text telephone). This lets you type messages back and forth on the telephone instead of talking or listening. These devices are also called TDD. When messages are typed on the keyboard, they are sent over the phone line to a receiving TTY. The message is shown on a monitor.  Use text messaging, social media, and email if it is hard for you to communicate by telephone.  Try to learn a listening technique called speechreading. It is not lipreading. You pay attention to people's gestures, expressions, posture, and tone of voice. These clues can help you understand what a person is saying. Face the person you are talking to, and have them face you. Make sure the lighting is good. You need to see the other person's face clearly.  Think about counseling if you need help to adjust to your hearing loss.  When should you call for help?  Watch closely for changes in your health, and be sure to contact your doctor if:    You think your hearing is getting worse.     You have new symptoms, such as dizziness or nausea.   Where can you learn more?  Go to https://www.[employer].net/patiented  Enter R798 in the search box to learn more about \"Hearing Loss: Care Instructions.\"  Current as of: March 1, 2023               Content Version: 13.7    4077-8442 [email or web address].   Care instructions adapted under license by your healthcare professional. If you have questions about a medical condition or this instruction, always ask your healthcare professional. [email or web address] disclaims any warranty or liability for your use of this information.      Bladder Training: Care Instructions  Your Care Instructions     Bladder training is used to treat urge incontinence and stress incontinence. Urge incontinence means that " the need to urinate comes on so fast that you can't get to a toilet in time. Stress incontinence means that you leak urine because of pressure on your bladder. For example, it may happen when you laugh, cough, or lift something heavy.  Bladder training can increase how long you can wait before you have to urinate. It can also help your bladder hold more urine. And it can give you better control over the urge to urinate.  It is important to remember that bladder training takes a few weeks to a few months to make a difference. You may not see results right away, but don't give up.  Follow-up care is a key part of your treatment and safety. Be sure to make and go to all appointments, and call your doctor if you are having problems. It's also a good idea to know your test results and keep a list of the medicines you take.  How can you care for yourself at home?  Work with your doctor to come up with a bladder training program that is right for you. You may use one or more of the following methods.  Delayed urination  In the beginning, try to keep from urinating for 5 minutes after you first feel the need to go.  While you wait, take deep, slow breaths to relax. Kegel exercises can also help you delay the need to go to the bathroom.  After some practice, when you can easily wait 5 minutes to urinate, try to wait 10 minutes before you urinate.  Slowly increase the waiting period until you are able to control when you have to urinate.  Scheduled urination  Empty your bladder when you first wake up in the morning.  Schedule times throughout the day when you will urinate.  Start by going to the bathroom every hour, even if you don't need to go.  Slowly increase the time between trips to the bathroom.  When you have found a schedule that works well for you, keep doing it.  If you wake up during the night and have to urinate, do it. Apply your schedule to waking hours only.  Kegel exercises  These tighten and strengthen pelvic  "muscles, which can help you control the flow of urine. (If doing these exercises causes pain, stop doing them and talk with your doctor.) To do Kegel exercises:  Squeeze your muscles as if you were trying not to pass gas. Or squeeze your muscles as if you were stopping the flow of urine. Your belly, legs, and buttocks shouldn't move.  Hold the squeeze for 3 seconds, then relax for 5 to 10 seconds.  Start with 3 seconds, then add 1 second each week until you are able to squeeze for 10 seconds.  Repeat the exercise 10 times a session. Do 3 to 8 sessions a day.  When should you call for help?  Watch closely for changes in your health, and be sure to contact your doctor if:    Your incontinence is getting worse.     You do not get better as expected.   Where can you learn more?  Go to https://www.[employer].net/patiented  Enter V684 in the search box to learn more about \"Bladder Training: Care Instructions.\"  Current as of: March 1, 2023               Content Version: 13.7    3607-2443 [employer].   Care instructions adapted under license by your healthcare professional. If you have questions about a medical condition or this instruction, always ask your healthcare professional. [employer] disclaims any warranty or liability for your use of this information.         "

## 2023-10-05 LAB
ALT SERPL W P-5'-P-CCNC: 26 U/L (ref 0–70)
ANION GAP SERPL CALCULATED.3IONS-SCNC: 11 MMOL/L (ref 7–15)
BUN SERPL-MCNC: 27.9 MG/DL (ref 8–23)
CALCIUM SERPL-MCNC: 9.2 MG/DL (ref 8.8–10.2)
CHLORIDE SERPL-SCNC: 105 MMOL/L (ref 98–107)
CHOLEST SERPL-MCNC: 153 MG/DL
CREAT SERPL-MCNC: 1.14 MG/DL (ref 0.67–1.17)
DEPRECATED HCO3 PLAS-SCNC: 23 MMOL/L (ref 22–29)
EGFRCR SERPLBLD CKD-EPI 2021: 65 ML/MIN/1.73M2
GLUCOSE SERPL-MCNC: 88 MG/DL (ref 70–99)
HDLC SERPL-MCNC: 48 MG/DL
LDLC SERPL CALC-MCNC: 55 MG/DL
NONHDLC SERPL-MCNC: 105 MG/DL
POTASSIUM SERPL-SCNC: 4.5 MMOL/L (ref 3.4–5.3)
PSA SERPL DL<=0.01 NG/ML-MCNC: <0.01 NG/ML
SODIUM SERPL-SCNC: 139 MMOL/L (ref 135–145)
TRIGL SERPL-MCNC: 252 MG/DL

## 2023-10-26 NOTE — TELEPHONE ENCOUNTER
FUTURE VISIT INFORMATION      FUTURE VISIT INFORMATION:  Date: 12/13/23  Time: 1:20pm  [place] [facility] [place] [facility] [place] INFORMATION:  Referring provider:  Justen Monahan MD   Referring providers clinic:  [facility]   Reason for visit/diagnosis  Recurrent epistaxis, apt per Pt, [place] verified     RECORDS REQUESTED FROM:       Clinic name Comments Records Status Imaging Status   [facility]  10/4/23- OV Justen Monahan MD  [facility]  6/19/23- ov Miladys Kirby PA-C [facility]  1/10/23- ED Catherine Steinberg MD   1/4/23- ED Beba Cardoza PA-C [facility]

## 2023-10-31 DIAGNOSIS — E78.5 HYPERLIPIDEMIA, UNSPECIFIED HYPERLIPIDEMIA TYPE: ICD-10-CM

## 2023-10-31 RX ORDER — EZETIMIBE 10 MG/1
10 TABLET ORAL DAILY
Qty: 90 TABLET | Refills: 1 | Status: SHIPPED | OUTPATIENT
Start: 2023-10-31 | End: 2024-04-29

## 2023-11-02 ENCOUNTER — MYC MEDICAL ADVICE (OUTPATIENT)
Dept: FAMILY MEDICINE | Facility: CLINIC | Age: 83
End: 2023-11-02
Payer: MEDICARE

## 2023-11-02 DIAGNOSIS — I10 HYPERTENSION: ICD-10-CM

## 2023-11-02 DIAGNOSIS — F41.1 ANXIETY STATE: ICD-10-CM

## 2023-11-02 RX ORDER — LOSARTAN POTASSIUM 100 MG/1
100 TABLET ORAL DAILY
Qty: 90 TABLET | Refills: 2 | Status: SHIPPED | OUTPATIENT
Start: 2023-11-02 | End: 2024-07-30

## 2023-11-02 RX ORDER — HYDROXYZINE HYDROCHLORIDE 25 MG/1
25 TABLET, FILM COATED ORAL AT BEDTIME
Qty: 90 TABLET | Refills: 2 | Status: SHIPPED | OUTPATIENT
Start: 2023-11-02 | End: 2024-07-30

## 2023-11-12 ENCOUNTER — HEALTH MAINTENANCE LETTER (OUTPATIENT)
Age: 83
End: 2023-11-12

## 2023-12-07 DIAGNOSIS — G47.00 INSOMNIA, UNSPECIFIED TYPE: ICD-10-CM

## 2023-12-07 DIAGNOSIS — E78.5 HYPERLIPIDEMIA, UNSPECIFIED HYPERLIPIDEMIA TYPE: ICD-10-CM

## 2023-12-07 DIAGNOSIS — K20.90 ESOPHAGITIS: ICD-10-CM

## 2023-12-07 RX ORDER — LORAZEPAM 1 MG/1
1 TABLET ORAL AT BEDTIME
Qty: 90 TABLET | Refills: 0 | Status: SHIPPED | OUTPATIENT
Start: 2023-12-07 | End: 2024-03-04

## 2023-12-07 RX ORDER — ATORVASTATIN CALCIUM 80 MG/1
TABLET, FILM COATED ORAL
Qty: 90 TABLET | Refills: 1 | Status: SHIPPED | OUTPATIENT
Start: 2023-12-07 | End: 2024-05-27

## 2023-12-13 ENCOUNTER — OFFICE VISIT (OUTPATIENT)
Dept: OTOLARYNGOLOGY | Facility: CLINIC | Age: 83
End: 2023-12-13
Payer: MEDICARE

## 2023-12-13 ENCOUNTER — PRE VISIT (OUTPATIENT)
Dept: OTOLARYNGOLOGY | Facility: CLINIC | Age: 83
End: 2023-12-13

## 2023-12-13 VITALS
WEIGHT: 185.8 LBS | DIASTOLIC BLOOD PRESSURE: 71 MMHG | HEART RATE: 60 BPM | BODY MASS INDEX: 26.01 KG/M2 | SYSTOLIC BLOOD PRESSURE: 135 MMHG | HEIGHT: 71 IN | OXYGEN SATURATION: 93 %

## 2023-12-13 DIAGNOSIS — R04.0 EPISTAXIS: Primary | ICD-10-CM

## 2023-12-13 PROCEDURE — 99203 OFFICE O/P NEW LOW 30 MIN: CPT | Mod: 25 | Performed by: STUDENT IN AN ORGANIZED HEALTH CARE EDUCATION/TRAINING PROGRAM

## 2023-12-13 PROCEDURE — 31231 NASAL ENDOSCOPY DX: CPT | Performed by: STUDENT IN AN ORGANIZED HEALTH CARE EDUCATION/TRAINING PROGRAM

## 2023-12-13 ASSESSMENT — PAIN SCALES - GENERAL: PAINLEVEL: NO PAIN (0)

## 2023-12-13 NOTE — PROGRESS NOTES
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 About a year ago, episodes of epistaxis started occurring once a week; mostly on the right side. He states that his episodes of epistaxis are at anytime no matter the activity. He was initially seen by his PCP and was refereed to see an ENT. He has tried numerous OTC treatments including: nasal spray and Vaseline. These methods showed moderate relief of symptoms as he went three weeks without an episode of epistaxis. However, he did have a nosebleed last night prior to this visit. He feels that he has good humidity at home.    He also complaints of a sore throat that started 6 weeks ago. He reports taking cough drops but still has some lingering throat soreness. He is currently retired.      Sino-Nasal Outcome Test (SNOT - 22)  1. Need to Blow Nose: (P) Moderate  2. Nasal Blockage: (P) None  3. Sneezing: (P) Very mild  4. Runny Nose: (P) Mild or slight  5. Cough: (P) None  6. Post-nasal discharge: (P) Very mild  7. Thick nasal discharge: (P) None  8. Ear fullness: (P) None  9. Dizziness: (P) None  10. Ear Pain: (P) None  11. Facial pain/pressure: (P) None  12. Decreased Sense of Smell/Taste: (P) None  13. Difficulty falling asleep: (P) Moderate  14. Wake up at night: (P) Moderate  15. Lack of a good night's sleep: (P) Mild or slight  16. Wake up tired: (P) Mild or slight  17. Fatigue: (P) Very mild  18. Reduced Productivity: (P) None  19. Reduced Concentration: (P) None  20. Frustrated/restless/irritable: (P) None  21. Sad: (P) None  22. Embarrassed: (P) Mild or slight  Total Score: (P) 20       Review of Systems:  A comprehensive 14-point review of systems was performed with  positives and pertinent negatives listed in the HPI.    Past Medical/Surgical History:  Reviewed today with the patient. No history of major medical comorbidities. No prior history of sinonasal surgery or trauma.    Allergies:     No Known Allergies    Medical History:  Past Medical History:   Diagnosis Date    Anxiety state, unspecified     Created by Conversion     Enthesopathy of unspecified site     Created by Conversion [facility] Annotation: Sep 19 2008 11:28AM - Bonny Schmitz: Biceps     Heart disease 10/3/2021    Stint implanted 10/5/2021    Malignant neoplasm of prostate (H)     Created by Conversion     Nocturia     Created by Conversion     Other and unspecified hyperlipidemia     Created by Conversion     PE (pulmonary thromboembolism) (H)     Blood clot in lungs    Swelling of limb     Created by Conversion     Unspecified essential hypertension     Created by Conversion     Unspecified hearing loss     Created by Conversion     Unspecified hereditary and idiopathic peripheral neuropathy     Created by Conversion     Unspecified tinnitus     Created by Conversion         Surgical History:   Past Surgical History:   Procedure Laterality Date    BIOPSY      EYE SURGERY  2020    Cataract surgery    PROSTATECTOMY      TONSILLECTOMY      age 3        Family History:  Family History   Problem Relation Age of Onset    Cerebrovascular Disease Father         Acute Myocardial Infarction    Heart Disease Father     CABG Mother     Heart Disease Mother     Heart Disease Sister         all four sisters    Heart Disease Sister     Heart Disease Sister     Heart Disease Sister         Social History:   Social History     Socioeconomic History    Marital status:    Tobacco Use    Smoking status: Former     Packs/day: 2.00     Years: 20.00     Additional pack years: 0.00     Total pack years: 40.00     Types: Cigarettes     Start date: 1958     Quit date: 1982     Years since quittin.9      "Passive exposure: Never    Smokeless tobacco: Never   Vaping Use    Vaping Use: Never used   Substance and Sexual Activity    Alcohol use: Yes     Alcohol/week: 2.0 standard drinks of alcohol     Comment: 1 glass red wine/day    Drug use: Never    Sexual activity: Not Currently     Partners: Female     Birth control/protection: None   Other Topics Concern    Parent/sibling w/ CABG, MI or angioplasty before 65F 55M? Yes     Social Determinants of Health     Financial Resource Strain: Low Risk  (10/1/2023)    Financial Resource Strain     Within the past 12 months, have you or your family members you live with been unable to get utilities (heat, electricity) when it was really needed?: No   Food Insecurity: Low Risk  (10/1/2023)    Food Insecurity     Within the past 12 months, did you worry that your food would run out before you got money to buy more?: No     Within the past 12 months, did the food you bought just not last and you didn t have money to get more?: No   Transportation Needs: Low Risk  (10/1/2023)    Transportation Needs     Within the past 12 months, has lack of transportation kept you from medical appointments, getting your medicines, non-medical meetings or appointments, work, or from getting things that you need?: No   Housing Stability: Low Risk  (10/1/2023)    Housing Stability     Do you have housing? : Yes     Are you worried about losing your housing?: No            Family History:  Reviewed with patient. No pertinent positives.    Physical Examination:  /71 (BP Location: Left arm, Patient Position: Sitting, Cuff Size: Adult Regular)   Pulse 60   Ht 1.803 m (5' 11\")   Wt 84.3 kg (185 lb 12.8 oz)   SpO2 93%   BMI 25.91 kg/m       Constitutional/Psychiatric: This is a well-appearing, well-dressed patient in no acute distress. male communicates easily with no obvious speech issues. Oriented to self and environment with appropriate conversation. Does not appear depressed, anxious, or " agitated.  Head: Normocephalic. Overall inspection reveals no prior scars, lesions, or masses. Facial motion is symmetric. No sinus tenderness.  Eyes: Extra-ocular motions are intact with symmetric gaze. Conjunctiva clear. No eyelid lesions. Pupils round, symmetric, and reactive to light.  Ears: Auricles without masses or lesions bilaterally. External auditory canals clear with minimal non-obstructive cerumen. Tympanic membranes intact without middle ear fluid or retraction. Hearing intact grossly at conversational volume.  Oral Cavity/Oropharynx: Lips, gingiva and teeth appear healthy. Floor of mouth without masses or lesions, normal appearing salivary glands. Oral mucosal membranes are well-hydrated. Tongue is mobile without deformity. Palate elevates symmetrically. No lesions of the posterior pharynx or tonsillar fossa. Throat clear on 30 degree scope exam  Neck/Lymphatic: Flat, symmetric without detectable lymphadenopathy. No thyroid/salivary gland tenderness or palpable nodules.  Respiratory: No increased work of breathing or respiratory distress. No audible stridor or stertor.  Peripheral/Cardiovascular: Brisk capillary refill bilaterally.   Neurologic: Cranial nerves II-XII grossly intact as tested.    Nasal examination: No lesions, masses, or scars of the external nose are visualized. I do not appreciate any external deviation of the bony nasal vault. External valves patent without inspiratory alar collapse. Columella is midline.      Given the patient's symptoms and the incomplete visualization of critical sinonasal areas with anterior rhinoscopy, a separately performed diagnostic nasal endoscopy procedure is indicated for a complete rhinologic evaluation per American Rhinologic Society recommendations (https://www.american-rhinologic.org/position_31231).    Procedure Note: Diagnostic Nasal Endoscopy, CPT 73099  Date of Service: October 18, 2023  Provider: Diego Lal MD   Presumptive Diagnosis: No primary  diagnosis found.  Anesthesia: Topical lidocaine and oxymetazoline via atomizer    Indication:  Evaluation of nasal/sinus complaints; inadequate visualization with anterior rhinoscopy    Description of procedure:  After obtaining consent for the procedure from the patient, the sinonasal cavity was sprayed with topical anesthetic. A rigid 30-degree nasal endoscope was used to first used to visualize the nasal floor and the nasopharynx on the left. A second pass was then made to visualize the middle meatus and sphenoethmoid recess. Finally, the scope was turned 90-degrees and used to visualize the olfactory cleft and frontal outflow tract. A similar approach was used for the contralateral side. male tolerated the procedure well without difficulty.    Endoscopic Findings:    Gifty-Janes Endoscopic Scoring System  Endoscopic observation Right Left   Polyps in middle meatus (0 = absent, 1 = restricted to middle meatus, 2 = Beyond middle meatus) 0 0   Discharge (0 = absent, 1 = thin and clear, 2 = thick, purulent) 0 0   Edema (0 = absent, 1 = mild-moderate, 2 = moderate-severe) 0 0   Crusting (0 = absent, 1 = mild-moderate, 2 = moderate-severe) 0 0   Scarring (0= absent, 1 = mild-moderate, 2 = moderate-severe) 0 0   Total 0 0       Bilateral sinonasal edema is noted with out mucoid drainage.  No nasal polyposis or mucopurulent drainage is seen within the nasal cavity.  The middle meatus is clear without polyps.  The superior meatus is clear without polyps.  The sphenoethmoid recess and olfactory cleft are clear bilaterally.  No nasopharyngeal lesions are noted.  The eustachian tubes are patent and non-obstructed.  Redemonstration of septal deviation and turbinate hypertrophy as noted above.    No areas of scab or active excoriation.    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     Discussed  extensively with patient conservative measures for epistaxis.    1.  We will have patient use a humidification device at nighttime and also in any room they spends a significant amount of time.    2.  Discussed using Vaseline (or aquaphor) to the right opening of the naris.  Do this twice a day and not use a Q-tip in order to prevent any further trauma.  Instructed to place it on the thumb and then gently put it over the opening of the nose and allow it to melt.    3.  Discussed Ocean nasal spray and indications for use.  If significant dryness or persistent issues we will have patient get it over-the-counter.    4.  Discussed that he can use Afrin or QuikClot to help stop bleeds    5. Ok to follow-up PRN now but if issues return can consider nasal cautery         Scribe Disclosure:   I, Dat Newton, am serving as a scribe; to document services personally performed by Diego Lal MD -based on data collection and the provider's statements to me.     Provider Disclosure:  I agree with above History, Review of Systems, Physical exam and Plan.  I have reviewed the content of the documentation and have edited it as needed. I have personally performed the services documented here and the documentation accurately represents those services and the decisions I have made.      Diego Lal MD    [facility]  Rhinology  Endoscopic Skull Base Surgery  [facility]  Department of Otolaryngology - Head & Neck Surgery

## 2023-12-13 NOTE — PATIENT INSTRUCTIONS
You were seen in the ENT Clinic today by Dr. Lal. If you have any questions or concerns after your appointment, please contact us (see below)       2.   Please return to the ENT clinic as needed.           How to Contact Us:  Send a [employer] message to your provider. Our team will respond to you via [employer]. Occasionally, we will need to call you to get further information.  For urgent matters (Monday-Friday), call the ENT Clinic: [phone] and speak with a call center team member - they will route your call appropriately.   If you'd like to speak directly with a nurse, please find our contact information below. We do our best to check voicemail frequently throughout the day, and will work to call you back within 1-2 days. For urgent matters, please use the general clinic phone numbers listed above.        Kae ABREU RN  ENT RN Care Coordinator  Direct: [phone]  Rachel MCMAHON LPN  Direct: [phone]         [facility]  Department of Otolaryngology

## 2023-12-13 NOTE — NURSING NOTE
"Chief Complaint   Patient presents with    RECHECK    Blood pressure 135/71, pulse 60, height 1.803 m (5' 11\"), weight 84.3 kg (185 lb 12.8 oz), SpO2 93%. Rachel Tejada LPN    "

## 2023-12-13 NOTE — LETTER
12/13/2023       RE: Angel Luis Boykin  [address] [place] [id number]     Dear Colleague,    Thank you for referring your patient, Angel Luis Boykin, to the [facility] [place] at [facility]. Please see a copy of my visit note below.      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 About a year ago, episodes of epistaxis started occurring once a week; mostly on the right side. He states that his episodes of epistaxis are at anytime no matter the activity. He was initially seen by his PCP and was refereed to see an ENT. He has tried numerous OTC treatments including: nasal spray and Vaseline. These methods showed moderate relief of symptoms as he went three weeks without an episode of epistaxis. However, he did have a nosebleed last night prior to this visit. He feels that he has good humidity at home.    He also complaints of a sore throat that started 6 weeks ago. He reports taking cough drops but still has some lingering throat soreness. He is currently retired.      Sino-Nasal Outcome Test (SNOT - 22)  1. Need to Blow Nose: (P) Moderate  2. Nasal Blockage: (P) None  3. Sneezing: (P) Very mild  4. Runny Nose: (P) Mild or slight  5. Cough: (P) None  6. Post-nasal discharge: (P) Very mild  7. Thick nasal discharge: (P) None  8. Ear fullness: (P) None  9. Dizziness: (P) None  10. Ear Pain: (P) None  11. Facial pain/pressure: (P) None  12. Decreased Sense of Smell/Taste: (P) None  13. Difficulty falling asleep: (P) Moderate  14. Wake up at night: (P) Moderate  15. Lack of a good night's sleep: (P) Mild or slight  16. Wake up  tired: (P) Mild or slight  17. Fatigue: (P) Very mild  18. Reduced Productivity: (P) None  19. Reduced Concentration: (P) None  20. Frustrated/restless/irritable: (P) None  21. Sad: (P) None  22. Embarrassed: (P) Mild or slight  Total Score: (P) 20       Review of Systems:  A comprehensive 14-point review of systems was performed with positives and pertinent negatives listed in the HPI.    Past Medical/Surgical History:  Reviewed today with the patient. No history of major medical comorbidities. No prior history of sinonasal surgery or trauma.    Allergies:     No Known Allergies    Medical History:  Past Medical History:   Diagnosis Date    Anxiety state, unspecified     Created by Conversion     Enthesopathy of unspecified site     Created by Conversion [facility] Annotation: Sep 19 2008 11:28Bonny Noland: Biceps     Heart disease 10/3/2021    Stint implanted 10/5/2021    Malignant neoplasm of prostate (H)     Created by Conversion     Nocturia     Created by Conversion     Other and unspecified hyperlipidemia     Created by Conversion     PE (pulmonary thromboembolism) (H)     Blood clot in lungs    Swelling of limb     Created by Conversion     Unspecified essential hypertension     Created by Conversion     Unspecified hearing loss     Created by Conversion     Unspecified hereditary and idiopathic peripheral neuropathy     Created by Conversion     Unspecified tinnitus     Created by Conversion         Surgical History:   Past Surgical History:   Procedure Laterality Date    BIOPSY      EYE SURGERY  12/2020    Cataract surgery    PROSTATECTOMY      TONSILLECTOMY      age 3        Family History:  Family History   Problem Relation Age of Onset    Cerebrovascular Disease Father         Acute Myocardial Infarction    Heart Disease Father     CABG Mother     Heart Disease Mother     Heart Disease Sister         all four sisters    Heart Disease Sister     Heart Disease Sister     Heart Disease Sister          Social History:   Social History     Socioeconomic History    Marital status:    Tobacco Use    Smoking status: Former     Packs/day: 2.00     Years: 20.00     Additional pack years: 0.00     Total pack years: 40.00     Types: Cigarettes     Start date: 1958     Quit date: 1982     Years since quittin.9     Passive exposure: Never    Smokeless tobacco: Never   Vaping Use    Vaping Use: Never used   Substance and Sexual Activity    Alcohol use: Yes     Alcohol/week: 2.0 standard drinks of alcohol     Comment: 1 glass red wine/day    Drug use: Never    Sexual activity: Not Currently     Partners: Female     Birth control/protection: None   Other Topics Concern    Parent/sibling w/ CABG, MI or angioplasty before 65F 55M? Yes     Social Determinants of Health     Financial Resource Strain: Low Risk  (10/1/2023)    Financial Resource Strain     Within the past 12 months, have you or your family members you live with been unable to get utilities (heat, electricity) when it was really needed?: No   Food Insecurity: Low Risk  (10/1/2023)    Food Insecurity     Within the past 12 months, did you worry that your food would run out before you got money to buy more?: No     Within the past 12 months, did the food you bought just not last and you didn t have money to get more?: No   Transportation Needs: Low Risk  (10/1/2023)    Transportation Needs     Within the past 12 months, has lack of transportation kept you from medical appointments, getting your medicines, non-medical meetings or appointments, work, or from getting things that you need?: No   Housing Stability: Low Risk  (10/1/2023)    Housing Stability     Do you have housing? : Yes     Are you worried about losing your housing?: No            Family History:  Reviewed with patient. No pertinent positives.    Physical Examination:  /71 (BP Location: Left arm, Patient Position: Sitting, Cuff Size: Adult Regular)   Pulse 60   Ht 1.803 m  "(5' 11\")   Wt 84.3 kg (185 lb 12.8 oz)   SpO2 93%   BMI 25.91 kg/m       Constitutional/Psychiatric: This is a well-appearing, well-dressed patient in no acute distress. male communicates easily with no obvious speech issues. Oriented to self and environment with appropriate conversation. Does not appear depressed, anxious, or agitated.  Head: Normocephalic. Overall inspection reveals no prior scars, lesions, or masses. Facial motion is symmetric. No sinus tenderness.  Eyes: Extra-ocular motions are intact with symmetric gaze. Conjunctiva clear. No eyelid lesions. Pupils round, symmetric, and reactive to light.  Ears: Auricles without masses or lesions bilaterally. External auditory canals clear with minimal non-obstructive cerumen. Tympanic membranes intact without middle ear fluid or retraction. Hearing intact grossly at conversational volume.  Oral Cavity/Oropharynx: Lips, gingiva and teeth appear healthy. Floor of mouth without masses or lesions, normal appearing salivary glands. Oral mucosal membranes are well-hydrated. Tongue is mobile without deformity. Palate elevates symmetrically. No lesions of the posterior pharynx or tonsillar fossa. Throat clear on 30 degree scope exam  Neck/Lymphatic: Flat, symmetric without detectable lymphadenopathy. No thyroid/salivary gland tenderness or palpable nodules.  Respiratory: No increased work of breathing or respiratory distress. No audible stridor or stertor.  Peripheral/Cardiovascular: Brisk capillary refill bilaterally.   Neurologic: Cranial nerves II-XII grossly intact as tested.    Nasal examination: No lesions, masses, or scars of the external nose are visualized. I do not appreciate any external deviation of the bony nasal vault. External valves patent without inspiratory alar collapse. Columella is midline.      Given the patient's symptoms and the incomplete visualization of critical sinonasal areas with anterior rhinoscopy, a separately performed diagnostic " nasal endoscopy procedure is indicated for a complete rhinologic evaluation per American Rhinologic Society recommendations (https://www.american-rhinologic.org/position_31231).    Procedure Note: Diagnostic Nasal Endoscopy, CPT 66915  Date of Service: October 18, 2023  Provider: Diego Lal MD   Presumptive Diagnosis: No primary diagnosis found.  Anesthesia: Topical lidocaine and oxymetazoline via atomizer    Indication:  Evaluation of nasal/sinus complaints; inadequate visualization with anterior rhinoscopy    Description of procedure:  After obtaining consent for the procedure from the patient, the sinonasal cavity was sprayed with topical anesthetic. A rigid 30-degree nasal endoscope was used to first used to visualize the nasal floor and the nasopharynx on the left. A second pass was then made to visualize the middle meatus and sphenoethmoid recess. Finally, the scope was turned 90-degrees and used to visualize the olfactory cleft and frontal outflow tract. A similar approach was used for the contralateral side. male tolerated the procedure well without difficulty.    Endoscopic Findings:    Onekama-Janes Endoscopic Scoring System  Endoscopic observation Right Left   Polyps in middle meatus (0 = absent, 1 = restricted to middle meatus, 2 = Beyond middle meatus) 0 0   Discharge (0 = absent, 1 = thin and clear, 2 = thick, purulent) 0 0   Edema (0 = absent, 1 = mild-moderate, 2 = moderate-severe) 0 0   Crusting (0 = absent, 1 = mild-moderate, 2 = moderate-severe) 0 0   Scarring (0= absent, 1 = mild-moderate, 2 = moderate-severe) 0 0   Total 0 0       Bilateral sinonasal edema is noted with out mucoid drainage.  No nasal polyposis or mucopurulent drainage is seen within the nasal cavity.  The middle meatus is clear without polyps.  The superior meatus is clear without polyps.  The sphenoethmoid recess and olfactory cleft are clear bilaterally.  No nasopharyngeal lesions are noted.  The eustachian tubes are patent  and non-obstructed.  Redemonstration of septal deviation and turbinate hypertrophy as noted above.    No areas of scab or active excoriation.    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     Discussed extensively with patient conservative measures for epistaxis.    1.  We will have patient use a humidification device at nighttime and also in any room they spends a significant amount of time.    2.  Discussed using Vaseline (or aquaphor) to the right opening of the naris.  Do this twice a day and not use a Q-tip in order to prevent any further trauma.  Instructed to place it on the thumb and then gently put it over the opening of the nose and allow it to melt.    3.  Discussed Ocean nasal spray and indications for use.  If significant dryness or persistent issues we will have patient get it over-the-counter.    4.  Discussed that he can use Afrin or QuikClot to help stop bleeds    5. Ok to follow-up PRN now but if issues return can consider nasal cautery         Scribe Disclosure:   I, Dat Newton, am serving as a scribe; to document services personally performed by Diego Lal MD -based on data collection and the provider's statements to me.     Provider Disclosure:  I agree with above History, Review of Systems, Physical exam and Plan.  I have reviewed the content of the documentation and have edited it as needed. I have personally performed the services documented here and the documentation accurately represents those services and the decisions I have made.      Diego Lal MD    [facility]  Rhinology  Endoscopic Skull Base Surgery  [facility]  Department of Otolaryngology - Head & Neck Surgery

## 2024-03-02 DIAGNOSIS — G47.00 INSOMNIA, UNSPECIFIED TYPE: ICD-10-CM

## 2024-03-04 RX ORDER — LORAZEPAM 1 MG/1
1 TABLET ORAL AT BEDTIME
Qty: 90 TABLET | Refills: 0 | Status: SHIPPED | OUTPATIENT
Start: 2024-03-04 | End: 2024-05-28

## 2024-04-11 DIAGNOSIS — Z86.711 HISTORY OF PULMONARY EMBOLISM: ICD-10-CM

## 2024-04-11 RX ORDER — RIVAROXABAN 20 MG/1
TABLET, FILM COATED ORAL
Qty: 90 TABLET | Refills: 2 | Status: SHIPPED | OUTPATIENT
Start: 2024-04-11 | End: 2024-07-08

## 2024-04-29 DIAGNOSIS — E78.5 HYPERLIPIDEMIA, UNSPECIFIED HYPERLIPIDEMIA TYPE: ICD-10-CM

## 2024-04-29 DIAGNOSIS — I10 BENIGN ESSENTIAL HYPERTENSION: ICD-10-CM

## 2024-04-29 RX ORDER — EZETIMIBE 10 MG/1
10 TABLET ORAL DAILY
Qty: 90 TABLET | Refills: 0 | Status: SHIPPED | OUTPATIENT
Start: 2024-04-29 | End: 2024-07-30

## 2024-04-29 RX ORDER — AMLODIPINE BESYLATE 10 MG/1
10 TABLET ORAL DAILY
Qty: 90 TABLET | Refills: 0 | Status: SHIPPED | OUTPATIENT
Start: 2024-04-29 | End: 2024-07-30

## 2024-05-26 DIAGNOSIS — G47.00 INSOMNIA, UNSPECIFIED TYPE: ICD-10-CM

## 2024-05-26 DIAGNOSIS — E78.5 HYPERLIPIDEMIA, UNSPECIFIED HYPERLIPIDEMIA TYPE: ICD-10-CM

## 2024-05-27 RX ORDER — ATORVASTATIN CALCIUM 80 MG/1
TABLET, FILM COATED ORAL
Qty: 90 TABLET | Refills: 0 | Status: SHIPPED | OUTPATIENT
Start: 2024-05-27 | End: 2024-08-26

## 2024-05-28 RX ORDER — LORAZEPAM 1 MG/1
1 TABLET ORAL AT BEDTIME
Qty: 90 TABLET | Refills: 0 | Status: SHIPPED | OUTPATIENT
Start: 2024-05-28 | End: 2024-08-26

## 2024-07-07 DIAGNOSIS — Z86.711 HISTORY OF PULMONARY EMBOLISM: ICD-10-CM

## 2024-07-08 RX ORDER — RIVAROXABAN 20 MG/1
TABLET, FILM COATED ORAL
Qty: 90 TABLET | Refills: 2 | Status: SHIPPED | OUTPATIENT
Start: 2024-07-08

## 2024-07-30 DIAGNOSIS — F41.1 ANXIETY STATE: ICD-10-CM

## 2024-07-30 DIAGNOSIS — E78.5 HYPERLIPIDEMIA, UNSPECIFIED HYPERLIPIDEMIA TYPE: ICD-10-CM

## 2024-07-30 DIAGNOSIS — I10 BENIGN ESSENTIAL HYPERTENSION: ICD-10-CM

## 2024-07-30 DIAGNOSIS — I10 HYPERTENSION: ICD-10-CM

## 2024-07-30 RX ORDER — HYDROXYZINE HYDROCHLORIDE 25 MG/1
25 TABLET, FILM COATED ORAL AT BEDTIME
Qty: 90 TABLET | Refills: 2 | Status: SHIPPED | OUTPATIENT
Start: 2024-07-30

## 2024-07-30 RX ORDER — AMLODIPINE BESYLATE 10 MG/1
10 TABLET ORAL DAILY
Qty: 90 TABLET | Refills: 0 | Status: SHIPPED | OUTPATIENT
Start: 2024-07-30

## 2024-07-30 RX ORDER — LOSARTAN POTASSIUM 100 MG/1
100 TABLET ORAL DAILY
Qty: 90 TABLET | Refills: 2 | Status: SHIPPED | OUTPATIENT
Start: 2024-07-30

## 2024-07-30 RX ORDER — EZETIMIBE 10 MG/1
10 TABLET ORAL DAILY
Qty: 90 TABLET | Refills: 0 | Status: SHIPPED | OUTPATIENT
Start: 2024-07-30

## 2024-08-14 ENCOUNTER — PATIENT OUTREACH (OUTPATIENT)
Dept: CARE COORDINATION | Facility: CLINIC | Age: 84
End: 2024-08-14
Payer: MEDICARE

## 2024-08-22 ENCOUNTER — TRANSFERRED RECORDS (OUTPATIENT)
Dept: HEALTH INFORMATION MANAGEMENT | Facility: CLINIC | Age: 84
End: 2024-08-22
Payer: MEDICARE

## 2024-08-25 DIAGNOSIS — E78.5 HYPERLIPIDEMIA, UNSPECIFIED HYPERLIPIDEMIA TYPE: ICD-10-CM

## 2024-08-25 DIAGNOSIS — G47.00 INSOMNIA, UNSPECIFIED TYPE: ICD-10-CM

## 2024-08-25 DIAGNOSIS — K20.90 ESOPHAGITIS: ICD-10-CM

## 2024-08-26 RX ORDER — ATORVASTATIN CALCIUM 80 MG/1
TABLET, FILM COATED ORAL
Qty: 90 TABLET | Refills: 0 | Status: SHIPPED | OUTPATIENT
Start: 2024-08-26

## 2024-08-26 RX ORDER — LORAZEPAM 1 MG/1
1 TABLET ORAL AT BEDTIME
Qty: 90 TABLET | Refills: 0 | Status: SHIPPED | OUTPATIENT
Start: 2024-08-26

## 2024-10-03 DIAGNOSIS — R68.2 DRY MOUTH: ICD-10-CM

## 2024-10-03 RX ORDER — PILOCARPINE HYDROCHLORIDE 5 MG/1
TABLET, FILM COATED ORAL
Qty: 360 TABLET | Refills: 3 | Status: SHIPPED | OUTPATIENT
Start: 2024-10-03

## 2024-10-07 SDOH — HEALTH STABILITY: PHYSICAL HEALTH: ON AVERAGE, HOW MANY DAYS PER WEEK DO YOU ENGAGE IN MODERATE TO STRENUOUS EXERCISE (LIKE A BRISK WALK)?: 5 DAYS

## 2024-10-07 SDOH — HEALTH STABILITY: PHYSICAL HEALTH: ON AVERAGE, HOW MANY MINUTES DO YOU ENGAGE IN EXERCISE AT THIS LEVEL?: 40 MIN

## 2024-10-07 ASSESSMENT — SOCIAL DETERMINANTS OF HEALTH (SDOH): HOW OFTEN DO YOU GET TOGETHER WITH FRIENDS OR RELATIVES?: MORE THAN THREE TIMES A WEEK

## 2024-10-10 ENCOUNTER — OFFICE VISIT (OUTPATIENT)
Dept: FAMILY MEDICINE | Facility: CLINIC | Age: 84
End: 2024-10-10
Attending: FAMILY MEDICINE
Payer: MEDICARE

## 2024-10-10 VITALS
BODY MASS INDEX: 26.48 KG/M2 | DIASTOLIC BLOOD PRESSURE: 66 MMHG | HEIGHT: 70 IN | TEMPERATURE: 97.4 F | WEIGHT: 185 LBS | RESPIRATION RATE: 14 BRPM | OXYGEN SATURATION: 98 % | HEART RATE: 59 BPM | SYSTOLIC BLOOD PRESSURE: 159 MMHG

## 2024-10-10 DIAGNOSIS — M54.41 ACUTE RIGHT-SIDED LOW BACK PAIN WITH RIGHT-SIDED SCIATICA: ICD-10-CM

## 2024-10-10 DIAGNOSIS — E78.5 HYPERLIPIDEMIA, UNSPECIFIED HYPERLIPIDEMIA TYPE: ICD-10-CM

## 2024-10-10 DIAGNOSIS — G47.00 INSOMNIA, UNSPECIFIED TYPE: ICD-10-CM

## 2024-10-10 DIAGNOSIS — I26.99 PULMONARY EMBOLISM, UNSPECIFIED CHRONICITY, UNSPECIFIED PULMONARY EMBOLISM TYPE, UNSPECIFIED WHETHER ACUTE COR PULMONALE PRESENT (H): ICD-10-CM

## 2024-10-10 DIAGNOSIS — I21.4 NSTEMI (NON-ST ELEVATED MYOCARDIAL INFARCTION) (H): ICD-10-CM

## 2024-10-10 DIAGNOSIS — I10 BENIGN ESSENTIAL HYPERTENSION: ICD-10-CM

## 2024-10-10 DIAGNOSIS — I25.10 CORONARY ARTERY DISEASE INVOLVING NATIVE CORONARY ARTERY OF NATIVE HEART WITHOUT ANGINA PECTORIS: ICD-10-CM

## 2024-10-10 DIAGNOSIS — C61 PROSTATE CANCER (H): Primary | ICD-10-CM

## 2024-10-10 DIAGNOSIS — R35.1 NOCTURIA: ICD-10-CM

## 2024-10-10 DIAGNOSIS — D68.51 FACTOR V LEIDEN MUTATION (H): ICD-10-CM

## 2024-10-10 LAB
ALT SERPL W P-5'-P-CCNC: 18 U/L (ref 0–70)
ANION GAP SERPL CALCULATED.3IONS-SCNC: 11 MMOL/L (ref 7–15)
BUN SERPL-MCNC: 26.9 MG/DL (ref 8–23)
CALCIUM SERPL-MCNC: 9.2 MG/DL (ref 8.8–10.4)
CHLORIDE SERPL-SCNC: 105 MMOL/L (ref 98–107)
CHOLEST SERPL-MCNC: 137 MG/DL
CREAT SERPL-MCNC: 1.23 MG/DL (ref 0.67–1.17)
EGFRCR SERPLBLD CKD-EPI 2021: 59 ML/MIN/1.73M2
FASTING STATUS PATIENT QL REPORTED: YES
FASTING STATUS PATIENT QL REPORTED: YES
GLUCOSE SERPL-MCNC: 102 MG/DL (ref 70–99)
HCO3 SERPL-SCNC: 24 MMOL/L (ref 22–29)
HDLC SERPL-MCNC: 52 MG/DL
LDLC SERPL CALC-MCNC: 58 MG/DL
NONHDLC SERPL-MCNC: 85 MG/DL
POTASSIUM SERPL-SCNC: 4.5 MMOL/L (ref 3.4–5.3)
PSA SERPL DL<=0.01 NG/ML-MCNC: <0.01 NG/ML
SODIUM SERPL-SCNC: 140 MMOL/L (ref 135–145)
TRIGL SERPL-MCNC: 135 MG/DL

## 2024-10-10 PROCEDURE — 84460 ALANINE AMINO (ALT) (SGPT): CPT | Performed by: FAMILY MEDICINE

## 2024-10-10 PROCEDURE — 90662 IIV NO PRSV INCREASED AG IM: CPT | Performed by: FAMILY MEDICINE

## 2024-10-10 PROCEDURE — 80048 BASIC METABOLIC PNL TOTAL CA: CPT | Performed by: FAMILY MEDICINE

## 2024-10-10 PROCEDURE — G0439 PPPS, SUBSEQ VISIT: HCPCS | Performed by: FAMILY MEDICINE

## 2024-10-10 PROCEDURE — 80061 LIPID PANEL: CPT | Performed by: FAMILY MEDICINE

## 2024-10-10 PROCEDURE — G0008 ADMIN INFLUENZA VIRUS VAC: HCPCS | Performed by: FAMILY MEDICINE

## 2024-10-10 PROCEDURE — 36415 COLL VENOUS BLD VENIPUNCTURE: CPT | Performed by: FAMILY MEDICINE

## 2024-10-10 PROCEDURE — 84153 ASSAY OF PSA TOTAL: CPT | Performed by: FAMILY MEDICINE

## 2024-10-10 RX ORDER — METHYLPREDNISOLONE 4 MG/1
TABLET ORAL
Qty: 21 TABLET | Refills: 1 | Status: SHIPPED | OUTPATIENT
Start: 2024-10-10

## 2024-10-10 NOTE — ASSESSMENT & PLAN NOTE
Long-term lorazepam use, still not sleeping great, did work with therapist in the past.  Sleeping a little bit better, discussed option of weaning off of lorazepam, discussed risk of lorazepam, for now continue

## 2024-10-10 NOTE — ASSESSMENT & PLAN NOTE
Patient having occasional, about once a month, vague mild chest pain.  At rest, quite active and easily able to walk up a couple flights of stairs, never gets pain with walking.  General angina equivalent was indigestion  Planning to follow-up with cardiology

## 2024-10-10 NOTE — PROGRESS NOTES
Preventive Care Visit  [facility]  Justen Monahan MD, Family Medicine  Oct 10, 2024      Assessment & Plan   Problem List Items Addressed This Visit       Urinary Frequency More Than Twice At Night (Nocturia)     Sounds like he is having some stress and some urge incontinence.  Pretty mild.  Discussed medications for this.  No great options since he has hypertension.  Offered referral to urology, declined for now.         Pulmonary embolus (H)     Plus factor V, lifelong DOAC         Prostate cancer (H) - Primary     Diagnostic PSA         Relevant Orders    PSA, tumor marker    RESOLVED: NSTEMI (non-ST elevated myocardial infarction) (H)    Insomnia, unspecified type     Long-term lorazepam use, still not sleeping great, did work with therapist in the past.  Sleeping a little bit better, discussed option of weaning off of lorazepam, discussed risk of lorazepam, for now continue         Hyperlipidemia     Zetia and atorvastatin  Will be seeing cardiology in the fairly near future to review whether both are still needed, used for secondary prevention of coronary artery disease.  Lipid and ALT         Relevant Orders    ALT    Lipid Profile    Factor V Leiden mutation (H)    Coronary artery disease involving native coronary artery of native heart without angina pectoris     Patient having occasional, about once a month, vague mild chest pain.  At rest, quite active and easily able to walk up a couple flights of stairs, never gets pain with walking.  General angina equivalent was indigestion  Planning to follow-up with cardiology         Benign essential hypertension     Pressure reasonably good at home, 135 systolic,  Bottom number does not seem to be a problem  At age 84, this seems like a reasonable target, 2-week follow-up blood pressure call scheduled  BMP  Continue amlodipine and losartan.         Relevant Orders    Basic metabolic panel    Acute right-sided low back pain with  "right-sided sciatica    Relevant Medications    methylPREDNISolone (MEDROL DOSEPAK) 4 MG tablet therapy pack               BMI  Estimated body mass index is 26.48 kg/m  as calculated from the following:    Height as of this encounter: 1.78 m (5' 10.08\").    Weight as of this encounter: 83.9 kg (185 lb).       Counseling  Appropriate preventive services were addressed with this patient via screening, questionnaire, or discussion as appropriate for fall prevention, nutrition, physical activity, Tobacco-use cessation, social engagement, weight loss and cognition.  Checklist reviewing preventive services available has been given to the patient.  Reviewed patient's diet, addressing concerns and/or questions.   The patient was provided with written information regarding signs of hearing loss.   Information on urinary incontinence and treatment options given to patient.           Vielka Valenzuela is a 84 year old, presenting for the following:  Physical and Recheck Medication        10/10/2024     9:09 AM   Additional Questions   Roomed by george champion   Accompanied by self         Health Care Directive  Patient has a Health Care Directive on file  Advance care planning document is on file and is current.    HPI  See discussion above      10/7/2024   General Health   How would you rate your overall physical health? Good   Feel stress (tense, anxious, or unable to sleep) Not at all            10/7/2024   Nutrition   Diet: Regular (no restrictions)            10/7/2024   Exercise   Days per week of moderate/strenous exercise 5 days   Average minutes spent exercising at this level 40 min            10/7/2024   Social Factors   Frequency of gathering with friends or relatives More than three times a week   Worry food won't last until get money to buy more No   Food not last or not have enough money for food? No   Do you have housing? (Housing is defined as stable permanent housing and does not include staying ouside in a car, in a " tent, in an abandoned building, in an overnight shelter, or couch-surfing.) Yes   Are you worried about losing your housing? No   Lack of transportation? No   Unable to get utilities (heat,electricity)? No            10/7/2024   Fall Risk   Fallen 2 or more times in the past year? No    No   Trouble with walking or balance? No    No       Multiple values from one day are sorted in reverse-chronological order          10/7/2024   Activities of Daily Living- Home Safety   Needs help with the following daily activites None of the above   Safety concerns in the home None of the above            10/7/2024   Dental   Dentist two times every year? Yes            10/7/2024   Hearing Screening   Hearing concerns? (!) I NEED TO ASK PEOPLE TO SPEAK UP OR REPEAT THEMSELVES.    (!) IT'S HARDER TO UNDERSTAND WOMEN'S VOICES THAN MEN'S VOICES.       Multiple values from one day are sorted in reverse-chronological order         10/7/2024   Driving Risk Screening   Patient/family members have concerns about driving No            10/7/2024   General Alertness/Fatigue Screening   Have you been more tired than usual lately? No            10/7/2024   Urinary Incontinence Screening   Bothered by leaking urine in past 6 months Yes            10/7/2024   TB Screening   Were you born outside of the [place]? No            Today's PHQ-2 Score:       10/10/2024     9:02 AM   PHQ-2 ( 1999 [employer])   Q1: Little interest or pleasure in doing things 0   Q2: Feeling down, depressed or hopeless 0   PHQ-2 Score 0   Q1: Little interest or pleasure in doing things Not at all   Q2: Feeling down, depressed or hopeless Not at all   PHQ-2 Score 0           10/7/2024   Substance Use   Alcohol more than 3/day or more than 7/wk No   Do you have a current opioid prescription? No   How severe/bad is pain from 1 to 10? 0/10 (No Pain)   Do you use any other substances recreationally? No        Social History     Tobacco Use    Smoking status: Former     Current  "packs/day: 0.00     Average packs/day: 2.0 packs/day for 24.0 years (48.0 ttl pk-yrs)     Types: Cigarettes     Start date: 1958     Quit date: 1982     Years since quittin.8     Passive exposure: Never    Smokeless tobacco: Never   Vaping Use    Vaping status: Never Used   Substance Use Topics    Alcohol use: Yes     Alcohol/week: 2.0 standard drinks of alcohol     Comment: 1 glass red wine/day    Drug use: Never                 Reviewed and updated as needed this visit by Provider                      Current providers sharing in care for this patient include:  Patient Care Team:  Justen Monahan MD as PCP - General (Family Medicine)  Angel Luis Rivas MD as MD (Hematology & Oncology)  Justen Monahan MD as Assigned PCP  Diego Lal MD as MD (Otolaryngology)  Diego Lal MD as Assigned Surgical Provider    The following health maintenance items are reviewed in Epic and correct as of today:  Health Maintenance   Topic Date Due    MEDICARE ANNUAL WELLNESS VISIT  10/28/2021    INFLUENZA VACCINE (1) 2024    COVID-19 Vaccine ( season) 2024    BMP  10/04/2024    LIPID  10/04/2024    ANNUAL REVIEW OF HM ORDERS  10/04/2024    FALL RISK ASSESSMENT  10/10/2025    ADVANCE CARE PLANNING  10/04/2028    DTAP/TDAP/TD IMMUNIZATION (6 - Td or Tdap) 2032    PHQ-2 (once per calendar year)  Completed    Pneumococcal Vaccine: 65+ Years  Completed    ZOSTER IMMUNIZATION  Completed    RSV VACCINE  Completed    HPV IMMUNIZATION  Aged Out    MENINGITIS IMMUNIZATION  Aged Out    RSV MONOCLONAL ANTIBODY  Aged Out    LUNG CANCER SCREENING  Discontinued            Objective    Exam  BP (!) 148/69 (BP Location: Right arm, Patient Position: Right side, Cuff Size: Adult Regular)   Pulse 59   Temp 97.4  F (36.3  C) (Temporal)   Resp 14   Ht 1.78 m (5' 10.08\")   Wt 83.9 kg (185 lb)   SpO2 98%   BMI 26.48 kg/m     Estimated body mass index is 26.48 kg/m  as calculated from the following:    Height " "as of this encounter: 1.78 m (5' 10.08\").    Weight as of this encounter: 83.9 kg (185 lb).    Physical Exam  Gen- alert, oriented/ appropriately responsive  HEENT- normal cephalic, atraumatic.   Chest- Normal inspiration and expiration.    Clear to ascultation.    No chest wall deformity or scar.  CV- Heart regular rate and rhythm  normal tones, no murmurs   No gallops or rubs.  Ext- appear well perfused, no edema  Skin- warm and dry,   no visualized rash           No data to display                       Signed Electronically by: Justen Monahan MD Prior to immunization administration, verified patients identity using patient s name and date of birth. Please see Immunization Activity for additional information.     Screening Questionnaire for Adult Immunization    Are you sick today?   No   Do you have allergies to medications, food, a vaccine component or latex?   No   Have you ever had a serious reaction after receiving a vaccination?   No   Do you have a long-term health problem with heart, lung, kidney, or metabolic disease (e.g., diabetes), asthma, a blood disorder, no spleen, complement component deficiency, a cochlear implant, or a spinal fluid leak?  Are you on long-term aspirin therapy?   No   Do you have cancer, leukemia, HIV/AIDS, or any other immune system problem?   No   Do you have a parent, brother, or sister with an immune system problem?   No   In the past 3 months, have you taken medications that affect  your immune system, such as prednisone, other steroids, or anticancer drugs; drugs for the treatment of rheumatoid arthritis, Crohn s disease, or psoriasis; or have you had radiation treatments?   No   Have you had a seizure, or a brain or other nervous system problem?   No   During the past year, have you received a transfusion of blood or blood    products, or been given immune (gamma) globulin or antiviral drug?   No   For women: Are you pregnant or is there a chance you could become       " pregnant during the next month?   No   Have you received any vaccinations in the past 4 weeks?   No     Immunization questionnaire answers were all negative.      Patient instructed to remain in clinic for 15 minutes afterwards, and to report any adverse reactions.     Screening performed by Layo Beard MA on 10/10/2024 at 9:16 AM.

## 2024-10-10 NOTE — ASSESSMENT & PLAN NOTE
Sounds like he is having some stress and some urge incontinence.  Pretty mild.  Discussed medications for this.  No great options since he has hypertension.  Offered referral to urology, declined for now.

## 2024-10-10 NOTE — ASSESSMENT & PLAN NOTE
Zetia and atorvastatin  Will be seeing cardiology in the fairly near future to review whether both are still needed, used for secondary prevention of coronary artery disease.  Lipid and ALT

## 2024-10-10 NOTE — ASSESSMENT & PLAN NOTE
Pressure reasonably good at home, 135 systolic,  Bottom number does not seem to be a problem  At age 84, this seems like a reasonable target, 2-week follow-up blood pressure call scheduled  BMP  Continue amlodipine and losartan.

## 2024-10-24 ENCOUNTER — VIRTUAL VISIT (OUTPATIENT)
Dept: FAMILY MEDICINE | Facility: CLINIC | Age: 84
End: 2024-10-24
Payer: MEDICARE

## 2024-10-24 DIAGNOSIS — R79.89 ELEVATED SERUM CREATININE: ICD-10-CM

## 2024-10-24 DIAGNOSIS — I10 BENIGN ESSENTIAL HYPERTENSION: Primary | ICD-10-CM

## 2024-10-24 PROCEDURE — 99441 PR PHYSICIAN TELEPHONE EVALUATION 5-10 MIN: CPT | Mod: 93 | Performed by: FAMILY MEDICINE

## 2024-10-24 NOTE — PROGRESS NOTES
"Nicolas is a 84 year old who is being evaluated via a billable telephone visit.      Originating Location (pt. Location): Home    Distant Location (provider location):  On-site    Assessment & Plan   Problem List Items Addressed This Visit       Elevated serum creatinine     Creatinine very slightly elevated on recent basic metabolic profile.  Carpal tunnel discussed this with him, there has been a gradual slowing of his GFR over the last 4 BMPs or so.  But really very mild, still borderline, reassured that I am not worried about that, will continue to follow.  Continue losartan, avoid nonsteroidals as he is already doing.         Benign essential hypertension - Primary     Patient reports about 20 blood pressures and are all in range, averages right around 130 over upper 60s.  On losartan and amlodipine, top dose of both, tolerating well.                        Subjective   Nicolas is a 84 year old, presenting for the following health issues:  Hypertension (Bp check )        10/24/2024     8:41 AM   Additional Questions   Roomed by hser   Accompanied by self     History of Present Illness       Reason for visit:  [place]ook pressure for last two weeks    He eats 2-3 servings of fruits and vegetables daily.He consumes 1 sweetened beverage(s) daily.He exercises with enough effort to increase his heart rate 30 to 60 minutes per day.  He exercises with enough effort to increase his heart rate 5 days per week.   He is taking medications regularly.             Objective    Vitals - Patient Reported  Systolic (Patient Reported): 137  Diastolic (Patient Reported): 69  Weight (Patient Reported): 83.9 kg (185 lb)  Height (Patient Reported): 177.8 cm (5' 10\")  BMI (Based on Pt Reported Ht/Wt): 26.54  Pulse (Patient Reported): 53  Pain Score: No Pain (0)  Pain Loc: Other - see comment        Physical Exam   General: Alert and no distress //Respiratory: No audible wheeze, cough, or shortness of breath // Psychiatric:  Appropriate affect, " tone, and pace of words      Office Visit on 10/10/2024   Component Date Value Ref Range Status    ALT 10/10/2024 18  0 - 70 U/L Final    Sodium 10/10/2024 140  135 - 145 mmol/L Final    Potassium 10/10/2024 4.5  3.4 - 5.3 mmol/L Final    Chloride 10/10/2024 105  98 - 107 mmol/L Final    Carbon Dioxide (CO2) 10/10/2024 24  22 - 29 mmol/L Final    Anion Gap 10/10/2024 11  7 - 15 mmol/L Final    Urea Nitrogen 10/10/2024 26.9 (H)  8.0 - 23.0 mg/dL Final    Creatinine 10/10/2024 1.23 (H)  0.67 - 1.17 mg/dL Final    GFR Estimate 10/10/2024 59 (L)  >60 mL/min/1.73m2 Final    eGFR calculated using 2021 CKD-EPI equation.    Calcium 10/10/2024 9.2  8.8 - 10.4 mg/dL Final    Reference intervals for this test were updated on 7/16/2024 to reflect our healthy population more accurately. There may be differences in the flagging of prior results with similar values performed with this method. Those prior results can be interpreted in the context of the updated reference intervals.    Glucose 10/10/2024 102 (H)  70 - 99 mg/dL Final    Patient Fasting > 8hrs? 10/10/2024 Yes   Final    Cholesterol 10/10/2024 137  <200 mg/dL Final    Triglycerides 10/10/2024 135  <150 mg/dL Final    Direct Measure HDL 10/10/2024 52  >=40 mg/dL Final    LDL Cholesterol Calculated 10/10/2024 58  <100 mg/dL Final    Non HDL Cholesterol 10/10/2024 85  <130 mg/dL Final    Patient Fasting > 8hrs? 10/10/2024 Yes   Final    PSA Tumor Marker 10/10/2024 <0.01  ng/mL Final    No reference ranges have been established for patients over 80 years.         Phone call duration: 5 minutes  Signed Electronically by: Justen Monahan MD      Prior to immunization administration, verified patients identity using patient s name and date of birth. Please see Immunization Activity for additional information.     Screening Questionnaire for Adult Immunization    Are you sick today?   No   Do you have allergies to medications, food, a vaccine component or latex?   No   Have you  ever had a serious reaction after receiving a vaccination?   No   Do you have a long-term health problem with heart, lung, kidney, or metabolic disease (e.g., diabetes), asthma, a blood disorder, no spleen, complement component deficiency, a cochlear implant, or a spinal fluid leak?  Are you on long-term aspirin therapy?   No   Do you have cancer, leukemia, HIV/AIDS, or any other immune system problem?   No   Do you have a parent, brother, or sister with an immune system problem?   No   In the past 3 months, have you taken medications that affect  your immune system, such as prednisone, other steroids, or anticancer drugs; drugs for the treatment of rheumatoid arthritis, Crohn s disease, or psoriasis; or have you had radiation treatments?   No   Have you had a seizure, or a brain or other nervous system problem?   No   During the past year, have you received a transfusion of blood or blood    products, or been given immune (gamma) globulin or antiviral drug?   No   For women: Are you pregnant or is there a chance you could become       pregnant during the next month?   No   Have you received any vaccinations in the past 4 weeks?   No     Immunization questionnaire answers were all negative.      Patient instructed to remain in clinic for 15 minutes afterwards, and to report any adverse reactions.     Screening performed by Lizette Goodwin MA on 10/24/2024 at 8:47 AM.

## 2024-10-24 NOTE — ASSESSMENT & PLAN NOTE
Creatinine very slightly elevated on recent basic metabolic profile.  Carpal tunnel discussed this with him, there has been a gradual slowing of his GFR over the last 4 BMPs or so.  But really very mild, still borderline, reassured that I am not worried about that, will continue to follow.  Continue losartan, avoid nonsteroidals as he is already doing.

## 2024-10-24 NOTE — ASSESSMENT & PLAN NOTE
Patient reports about 20 blood pressures and are all in range, averages right around 130 over upper 60s.  On losartan and amlodipine, top dose of both, tolerating well.

## 2024-10-27 DIAGNOSIS — I10 BENIGN ESSENTIAL HYPERTENSION: ICD-10-CM

## 2024-10-27 DIAGNOSIS — E78.5 HYPERLIPIDEMIA, UNSPECIFIED HYPERLIPIDEMIA TYPE: ICD-10-CM

## 2024-10-27 RX ORDER — EZETIMIBE 10 MG/1
10 TABLET ORAL DAILY
Qty: 90 TABLET | Refills: 3 | Status: SHIPPED | OUTPATIENT
Start: 2024-10-27

## 2024-10-28 RX ORDER — AMLODIPINE BESYLATE 10 MG/1
10 TABLET ORAL DAILY
Qty: 90 TABLET | Refills: 3 | Status: SHIPPED | OUTPATIENT
Start: 2024-10-28

## 2024-11-27 DIAGNOSIS — G47.00 INSOMNIA, UNSPECIFIED TYPE: ICD-10-CM

## 2024-11-27 DIAGNOSIS — E78.5 HYPERLIPIDEMIA, UNSPECIFIED HYPERLIPIDEMIA TYPE: ICD-10-CM

## 2024-11-27 RX ORDER — ATORVASTATIN CALCIUM 80 MG/1
TABLET, FILM COATED ORAL
Qty: 90 TABLET | Refills: 2 | Status: SHIPPED | OUTPATIENT
Start: 2024-11-27

## 2024-12-02 RX ORDER — LORAZEPAM 1 MG/1
1 TABLET ORAL AT BEDTIME
Qty: 90 TABLET | Refills: 0 | Status: SHIPPED | OUTPATIENT
Start: 2024-12-02

## 2025-02-26 DIAGNOSIS — G47.00 INSOMNIA, UNSPECIFIED TYPE: ICD-10-CM

## 2025-02-26 RX ORDER — LORAZEPAM 1 MG/1
1 TABLET ORAL AT BEDTIME
Qty: 90 TABLET | Refills: 0 | Status: SHIPPED | OUTPATIENT
Start: 2025-02-26

## 2025-03-01 ENCOUNTER — MYC MEDICAL ADVICE (OUTPATIENT)
Dept: FAMILY MEDICINE | Facility: CLINIC | Age: 85
End: 2025-03-01
Payer: MEDICARE

## 2025-03-23 DIAGNOSIS — Z86.711 HISTORY OF PULMONARY EMBOLISM: ICD-10-CM

## 2025-03-24 RX ORDER — RIVAROXABAN 20 MG/1
TABLET, FILM COATED ORAL
Qty: 90 TABLET | Refills: 4 | Status: SHIPPED | OUTPATIENT
Start: 2025-03-24

## 2025-04-08 ENCOUNTER — TRANSFERRED RECORDS (OUTPATIENT)
Dept: HEALTH INFORMATION MANAGEMENT | Facility: CLINIC | Age: 85
End: 2025-04-08
Payer: MEDICARE

## 2025-04-15 ENCOUNTER — TRANSFERRED RECORDS (OUTPATIENT)
Dept: HEALTH INFORMATION MANAGEMENT | Facility: CLINIC | Age: 85
End: 2025-04-15
Payer: MEDICARE

## 2025-04-21 DIAGNOSIS — F41.1 ANXIETY STATE: ICD-10-CM

## 2025-04-21 DIAGNOSIS — I10 HYPERTENSION: ICD-10-CM

## 2025-04-21 RX ORDER — HYDROXYZINE HYDROCHLORIDE 25 MG/1
25 TABLET, FILM COATED ORAL AT BEDTIME
Qty: 90 TABLET | Refills: 1 | Status: SHIPPED | OUTPATIENT
Start: 2025-04-21

## 2025-04-21 RX ORDER — LOSARTAN POTASSIUM 100 MG/1
100 TABLET ORAL DAILY
Qty: 90 TABLET | Refills: 3 | Status: SHIPPED | OUTPATIENT
Start: 2025-04-21

## 2025-05-23 DIAGNOSIS — G47.00 INSOMNIA, UNSPECIFIED TYPE: ICD-10-CM

## 2025-05-25 DIAGNOSIS — E78.5 HYPERLIPIDEMIA, UNSPECIFIED HYPERLIPIDEMIA TYPE: ICD-10-CM

## 2025-05-25 RX ORDER — ATORVASTATIN CALCIUM 80 MG/1
80 TABLET, FILM COATED ORAL
Qty: 90 TABLET | Refills: 2 | OUTPATIENT
Start: 2025-05-25

## 2025-05-26 ENCOUNTER — MYC REFILL (OUTPATIENT)
Dept: FAMILY MEDICINE | Facility: CLINIC | Age: 85
End: 2025-05-26
Payer: MEDICARE

## 2025-05-26 DIAGNOSIS — G47.00 INSOMNIA, UNSPECIFIED TYPE: ICD-10-CM

## 2025-05-27 RX ORDER — LORAZEPAM 1 MG/1
1 TABLET ORAL AT BEDTIME
Qty: 90 TABLET | Refills: 0 | Status: SHIPPED | OUTPATIENT
Start: 2025-05-27

## 2025-05-27 RX ORDER — LORAZEPAM 1 MG/1
1 TABLET ORAL AT BEDTIME
Qty: 90 TABLET | Refills: 0 | OUTPATIENT
Start: 2025-05-27

## 2025-05-28 ENCOUNTER — MYC REFILL (OUTPATIENT)
Dept: FAMILY MEDICINE | Facility: CLINIC | Age: 85
End: 2025-05-28
Payer: MEDICARE

## 2025-05-28 DIAGNOSIS — G47.00 INSOMNIA, UNSPECIFIED TYPE: ICD-10-CM

## 2025-05-28 RX ORDER — LORAZEPAM 1 MG/1
1 TABLET ORAL AT BEDTIME
Qty: 90 TABLET | Refills: 0 | OUTPATIENT
Start: 2025-05-28

## 2025-08-15 SDOH — HEALTH STABILITY: PHYSICAL HEALTH: ON AVERAGE, HOW MANY DAYS PER WEEK DO YOU ENGAGE IN MODERATE TO STRENUOUS EXERCISE (LIKE A BRISK WALK)?: 5 DAYS

## 2025-08-15 SDOH — HEALTH STABILITY: PHYSICAL HEALTH: ON AVERAGE, HOW MANY MINUTES DO YOU ENGAGE IN EXERCISE AT THIS LEVEL?: 40 MIN

## 2025-08-15 ASSESSMENT — SOCIAL DETERMINANTS OF HEALTH (SDOH): HOW OFTEN DO YOU GET TOGETHER WITH FRIENDS OR RELATIVES?: MORE THAN THREE TIMES A WEEK

## 2025-08-20 ENCOUNTER — OFFICE VISIT (OUTPATIENT)
Dept: FAMILY MEDICINE | Facility: CLINIC | Age: 85
End: 2025-08-20
Payer: MEDICARE

## 2025-08-20 VITALS
HEIGHT: 70 IN | RESPIRATION RATE: 18 BRPM | TEMPERATURE: 98 F | WEIGHT: 181 LBS | SYSTOLIC BLOOD PRESSURE: 138 MMHG | DIASTOLIC BLOOD PRESSURE: 66 MMHG | BODY MASS INDEX: 25.91 KG/M2 | HEART RATE: 62 BPM | OXYGEN SATURATION: 99 %

## 2025-08-20 DIAGNOSIS — R79.89 ELEVATED SERUM CREATININE: ICD-10-CM

## 2025-08-20 DIAGNOSIS — I26.99 PULMONARY EMBOLISM, UNSPECIFIED CHRONICITY, UNSPECIFIED PULMONARY EMBOLISM TYPE, UNSPECIFIED WHETHER ACUTE COR PULMONALE PRESENT (H): ICD-10-CM

## 2025-08-20 DIAGNOSIS — K21.00 GASTROESOPHAGEAL REFLUX DISEASE WITH ESOPHAGITIS WITHOUT HEMORRHAGE: ICD-10-CM

## 2025-08-20 DIAGNOSIS — C61 PROSTATE CANCER (H): ICD-10-CM

## 2025-08-20 DIAGNOSIS — I10 BENIGN ESSENTIAL HYPERTENSION: ICD-10-CM

## 2025-08-20 DIAGNOSIS — I25.10 CORONARY ARTERY DISEASE INVOLVING NATIVE CORONARY ARTERY OF NATIVE HEART WITHOUT ANGINA PECTORIS: ICD-10-CM

## 2025-08-20 DIAGNOSIS — F41.1 ANXIETY STATE: ICD-10-CM

## 2025-08-20 DIAGNOSIS — D12.6 ADENOMATOUS POLYP OF COLON, UNSPECIFIED PART OF COLON: ICD-10-CM

## 2025-08-20 DIAGNOSIS — G62.9 PERIPHERAL POLYNEUROPATHY: ICD-10-CM

## 2025-08-20 DIAGNOSIS — Z00.00 HEALTHCARE MAINTENANCE: Primary | ICD-10-CM

## 2025-08-20 LAB
ALT SERPL W P-5'-P-CCNC: 23 U/L (ref 0–70)
CHOLEST SERPL-MCNC: 129 MG/DL
CREAT UR-MCNC: 162 MG/DL
FASTING STATUS PATIENT QL REPORTED: YES
HDLC SERPL-MCNC: 46 MG/DL
LDLC SERPL CALC-MCNC: 48 MG/DL
MICROALBUMIN UR-MCNC: <12 MG/L
MICROALBUMIN/CREAT UR: NORMAL MG/G{CREAT}
NONHDLC SERPL-MCNC: 83 MG/DL
PSA SERPL DL<=0.01 NG/ML-MCNC: <0.01 NG/ML
TRIGL SERPL-MCNC: 177 MG/DL

## 2025-08-20 PROCEDURE — 84460 ALANINE AMINO (ALT) (SGPT): CPT | Performed by: FAMILY MEDICINE

## 2025-08-20 PROCEDURE — 82570 ASSAY OF URINE CREATININE: CPT | Performed by: FAMILY MEDICINE

## 2025-08-20 PROCEDURE — 82043 UR ALBUMIN QUANTITATIVE: CPT | Performed by: FAMILY MEDICINE

## 2025-08-20 PROCEDURE — 80061 LIPID PANEL: CPT | Performed by: FAMILY MEDICINE

## 2025-08-20 PROCEDURE — 36415 COLL VENOUS BLD VENIPUNCTURE: CPT | Performed by: FAMILY MEDICINE

## 2025-08-20 PROCEDURE — 84153 ASSAY OF PSA TOTAL: CPT | Performed by: FAMILY MEDICINE

## 2025-08-22 DIAGNOSIS — E78.5 HYPERLIPIDEMIA, UNSPECIFIED HYPERLIPIDEMIA TYPE: ICD-10-CM

## 2025-08-22 DIAGNOSIS — M54.41 ACUTE RIGHT-SIDED LOW BACK PAIN WITH RIGHT-SIDED SCIATICA: ICD-10-CM

## 2025-08-22 DIAGNOSIS — G47.00 INSOMNIA, UNSPECIFIED TYPE: ICD-10-CM

## 2025-08-22 DIAGNOSIS — K20.90 ESOPHAGITIS: ICD-10-CM

## 2025-08-23 RX ORDER — OMEPRAZOLE 20 MG/1
20 CAPSULE, DELAYED RELEASE ORAL DAILY
Qty: 90 CAPSULE | Refills: 3 | Status: SHIPPED | OUTPATIENT
Start: 2025-08-23

## 2025-08-23 RX ORDER — ATORVASTATIN CALCIUM 80 MG/1
80 TABLET, FILM COATED ORAL DAILY
Qty: 90 TABLET | Refills: 3 | Status: SHIPPED | OUTPATIENT
Start: 2025-08-23

## 2025-08-25 RX ORDER — METHYLPREDNISOLONE 4 MG/1
TABLET ORAL
Qty: 1 EACH | Refills: 0 | Status: SHIPPED | OUTPATIENT
Start: 2025-08-25

## 2025-08-25 RX ORDER — LORAZEPAM 1 MG/1
1 TABLET ORAL AT BEDTIME
Qty: 90 TABLET | Refills: 0 | Status: SHIPPED | OUTPATIENT
Start: 2025-08-25